# Patient Record
Sex: FEMALE | Race: WHITE | NOT HISPANIC OR LATINO
[De-identification: names, ages, dates, MRNs, and addresses within clinical notes are randomized per-mention and may not be internally consistent; named-entity substitution may affect disease eponyms.]

---

## 2017-02-27 ENCOUNTER — APPOINTMENT (OUTPATIENT)
Dept: UROGYNECOLOGY | Facility: CLINIC | Age: 67
End: 2017-02-27

## 2017-02-27 DIAGNOSIS — R39.15 URGENCY OF URINATION: ICD-10-CM

## 2017-03-31 ENCOUNTER — APPOINTMENT (OUTPATIENT)
Dept: ULTRASOUND IMAGING | Facility: CLINIC | Age: 67
End: 2017-03-31

## 2017-03-31 ENCOUNTER — OUTPATIENT (OUTPATIENT)
Dept: OUTPATIENT SERVICES | Facility: HOSPITAL | Age: 67
LOS: 1 days | End: 2017-03-31
Payer: MEDICARE

## 2017-03-31 DIAGNOSIS — N81.9 FEMALE GENITAL PROLAPSE, UNSPECIFIED: ICD-10-CM

## 2017-03-31 PROCEDURE — 76856 US EXAM PELVIC COMPLETE: CPT

## 2017-03-31 PROCEDURE — 76830 TRANSVAGINAL US NON-OB: CPT

## 2017-04-17 ENCOUNTER — APPOINTMENT (OUTPATIENT)
Dept: UROGYNECOLOGY | Facility: CLINIC | Age: 67
End: 2017-04-17

## 2017-08-06 ENCOUNTER — EMERGENCY (EMERGENCY)
Facility: HOSPITAL | Age: 67
LOS: 1 days | Discharge: HOME | End: 2017-08-06
Attending: EMERGENCY MEDICINE | Admitting: EMERGENCY MEDICINE
Payer: MEDICARE

## 2017-08-06 VITALS
SYSTOLIC BLOOD PRESSURE: 161 MMHG | TEMPERATURE: 99 F | OXYGEN SATURATION: 98 % | HEART RATE: 97 BPM | RESPIRATION RATE: 18 BRPM | WEIGHT: 125 LBS | DIASTOLIC BLOOD PRESSURE: 90 MMHG | HEIGHT: 64 IN

## 2017-08-06 PROCEDURE — 73110 X-RAY EXAM OF WRIST: CPT | Mod: 26,RT

## 2017-08-06 PROCEDURE — 73502 X-RAY EXAM HIP UNI 2-3 VIEWS: CPT | Mod: 26,RT

## 2017-08-06 PROCEDURE — 73100 X-RAY EXAM OF WRIST: CPT | Mod: 26,52,59,RT

## 2017-08-06 PROCEDURE — 99283 EMERGENCY DEPT VISIT LOW MDM: CPT

## 2017-08-06 RX ORDER — MORPHINE SULFATE 50 MG/1
2 CAPSULE, EXTENDED RELEASE ORAL ONCE
Qty: 0 | Refills: 0 | Status: DISCONTINUED | OUTPATIENT
Start: 2017-08-06 | End: 2017-08-06

## 2017-08-06 RX ORDER — SODIUM CHLORIDE 9 MG/ML
3 INJECTION INTRAMUSCULAR; INTRAVENOUS; SUBCUTANEOUS ONCE
Qty: 0 | Refills: 0 | Status: COMPLETED | OUTPATIENT
Start: 2017-08-06 | End: 2017-08-06

## 2017-08-06 RX ADMIN — SODIUM CHLORIDE 3 MILLILITER(S): 9 INJECTION INTRAMUSCULAR; INTRAVENOUS; SUBCUTANEOUS at 22:22

## 2017-08-06 RX ADMIN — MORPHINE SULFATE 2 MILLIGRAM(S): 50 CAPSULE, EXTENDED RELEASE ORAL at 22:16

## 2017-08-06 NOTE — ED ADULT NURSE NOTE - OBJECTIVE STATEMENT
paul states that she was walking her little dog and fell over her flip flop and lander on the right wrist - wrist deformed with pulses present at this time,

## 2017-08-06 NOTE — ED STATDOCS - ATTENDING CONTRIBUTION TO CARE
I, Chicho Alba, performed the initial face to face bedside interview with this patient regarding history of present illness, review of symptoms and relevant past medical, social and family history.  I completed an independent physical examination.  I was the initial provider who evaluated this patient. I have signed out the follow up of any pending tests (i.e. labs, radiological studies) to the ACP.  I have communicated the patient’s plan of care and disposition with the ACP.

## 2017-08-06 NOTE — ED STATDOCS - CARE PLAN
Principal Discharge DX:	Radius fracture  Instructions for follow-up, activity and diet:	F/u with Orthopedic as discussed  Secondary Diagnosis:	Hip pain

## 2017-08-06 NOTE — CONSULT NOTE ADULT - SUBJECTIVE AND OBJECTIVE BOX
Pt Name: ALTAGRACIA BUTLER    MRN: [id number]      Patient is a 67y Female presenting to the emergency department with a chief complaint of right wrist pain and hip s/p fall   Patient is a 67y old  Female who presents with a chief complaint of right wrist pain and hip pain s/p fall.    HEALTH ISSUES - PROBLEM Dx: Right radius fx, Right hip pain       .      REVIEW OF SYSTEMS      General: no fevers, no chills 	    Skin/Breast: no rash  	  Ophthalmologic:  	  ENMT:	    Respiratory and Thorax:   no dyspnea   	  Cardiovascular:	no chest pain     Gastrointestinal:	no abdominal pain, no n/v/d    Genitourinary:	    Musculoskeletal: right wrist pain/deformity/hip pain 	    Neurological:	no loc    Psychiatric:	    Hematology/Lymphatics:	    Endocrine:	    Allergic/Immunologic:	    ROS is otherwise negative.    PAST MEDICAL & SURGICAL HISTORY:  PAST MEDICAL & SURGICAL HISTORY:  Osteoarthritis  Osteoporosis  Rheumatoid arthritis  IBS (irritable bowel syndrome)  Breast cancer in female      Allergies: aspirin (Unknown)  latex (Hives)  No Known Drug Allergies      Medications:     FAMILY HISTORY:  : non-contributory    Social History:  denies tobacco use     Ambulation: Walking independently               PHYSICAL EXAM:    Vital Signs Last 24 Hrs  T(C): 37.3 (06 Aug 2017 21:31), Max: 37.3 (06 Aug 2017 21:31)  T(F): 99.2 (06 Aug 2017 21:31), Max: 99.2 (06 Aug 2017 21:31)  HR: 97 (06 Aug 2017 21:31) (97 - 97)  BP: 161/90 (06 Aug 2017 21:31) (161/90 - 161/90)  BP(mean): --  RR: 18 (06 Aug 2017 21:31) (18 - 18)  SpO2: 98% (06 Aug 2017 21:31) (98% - 98%)  Daily Height in cm: 162.56 (06 Aug 2017 21:31)    Daily     Appearance: Alert, responsive, in mild acute distress.    Neck FROM    ENT Mucus membranes moist     Resp no labored breathing     ABD Soft NT    Neurological: Sensation is grossly intact to light touch. 5/5 motor function of all extremities. No focal deficits or weaknesses found.    Skin: no rash on visible skin. Skin is clean, dry and intact. No bleeding. No abrasions. No ulcerations.    Vascular: 2+ distal pulses. Cap refill < 2 sec. No signs of venous insuffiency or stasis. No extremity ulcerations. No cyanosis.    Musculoskeletal:           Right Upper Extremity: Positive obvious deformity to wrist, pulse intact, sensation intact, FROM to hand and elbow with no tenderness          Right Lower Extremity: mild tenderness to lateral hip, no rotation, no shortening, FROM, no bruising     Imaging Studies: right wrist positive distal radius fx, right hip xray positive OA, can not rule out fx    FRACTURE REDUCTION  PROCEDURE NOTE: Fracture reduction     Performed by:  Evert Wright PA-C    Indication: Acute fracture with displacement, requiring fracture reduction.    Consent: The risks and benefits of the procedure including incomplete reduction, nerve damage and bleeding were explained and the patient verbalized their understanding and wished to proceed with the procedure. Written consent was obtained following the discussion.    Universal Protocol: a time out was performed and the correct patient and site were verified     Procedure: Neurovascular exam intact prior to fracture reduction.  Skin exam : No bleeding or lacerations at the fracture site. Anesthesia/pain control, using aseptic technique, was administered using a hematoma block of 10 ml of 1% lidocaine. Reduction of the Right wrist  was accomplished via axial traction and careful manipulation. Following adequate reduction and alignment of the fractured bone, the fracture was immobilized with a  plaster splint. Distally, the extremity was neurovascular intact following the procedure.  The patient tolerated the procedure well.    Post reduction films obtained and demonstrated an adequate reduction.    Complications: None      Case discussed with Dr. Cruz who gave plan     A/P:  Pt is a  67y Female with Patient is a 67y old  Female who presents with a chief complaint of  found to have right distal radius fx and right hip pain     PLAN:   1. Needs CT of hip   2. NWB right upper extremity   3. Further plan pending CT Pt Name: ALTAGRACIA BUTLER    MRN: [id number]      Patient is a 67y Female presenting to the emergency department with a chief complaint of right wrist pain and hip s/p fall   Patient is a 67y old  Female who presents with a chief complaint of right wrist pain and hip pain s/p fall.    HEALTH ISSUES - PROBLEM Dx: Right radius fx, Right hip pain       .      REVIEW OF SYSTEMS      General: no fevers, no chills 	    Skin/Breast: no rash  	  Ophthalmologic:  	  ENMT:	    Respiratory and Thorax:   no dyspnea   	  Cardiovascular:	no chest pain     Gastrointestinal:	no abdominal pain, no n/v/d    Genitourinary:	    Musculoskeletal: right wrist pain/deformity/hip pain 	    Neurological:	no loc    Psychiatric:	    Hematology/Lymphatics:	    Endocrine:	    Allergic/Immunologic:	    ROS is otherwise negative.    PAST MEDICAL & SURGICAL HISTORY:  PAST MEDICAL & SURGICAL HISTORY:  Osteoarthritis  Osteoporosis  Rheumatoid arthritis  IBS (irritable bowel syndrome)  Breast cancer in female      Allergies: aspirin (Unknown)  latex (Hives)  No Known Drug Allergies      Medications:     FAMILY HISTORY:  : non-contributory    Social History:  denies tobacco use, Quit years ago     Ambulation: Walking independently               PHYSICAL EXAM:    Vital Signs Last 24 Hrs  T(C): 37.3 (06 Aug 2017 21:31), Max: 37.3 (06 Aug 2017 21:31)  T(F): 99.2 (06 Aug 2017 21:31), Max: 99.2 (06 Aug 2017 21:31)  HR: 97 (06 Aug 2017 21:31) (97 - 97)  BP: 161/90 (06 Aug 2017 21:31) (161/90 - 161/90)  BP(mean): --  RR: 18 (06 Aug 2017 21:31) (18 - 18)  SpO2: 98% (06 Aug 2017 21:31) (98% - 98%)  Daily Height in cm: 162.56 (06 Aug 2017 21:31)    Daily     Appearance: Alert, responsive, in mild acute distress.    Neck FROM    ENT Mucus membranes moist     Resp no labored breathing     ABD Soft NT    Neurological: Sensation is grossly intact to light touch. 5/5 motor function of all extremities. No focal deficits or weaknesses found.    Skin: no rash on visible skin. Skin is clean, dry and intact. No bleeding. No abrasions. No ulcerations.    Vascular: 2+ distal pulses. Cap refill < 2 sec. No signs of venous insuffiency or stasis. No extremity ulcerations. No cyanosis.    Musculoskeletal:           Right Upper Extremity: Positive obvious deformity to wrist, pulse intact, sensation intact, FROM to hand and elbow with no tenderness          Right Lower Extremity: mild tenderness to lateral hip, no rotation, no shortening, FROM, no bruising     Imaging Studies: right wrist positive distal radius fx, right hip xray positive OA, can not rule out fx    FRACTURE REDUCTION  PROCEDURE NOTE: Fracture reduction     Performed by:  Evert Wright PA-C    Indication: Acute fracture with displacement, requiring fracture reduction.    Consent: The risks and benefits of the procedure including incomplete reduction, nerve damage and bleeding were explained and the patient verbalized their understanding and wished to proceed with the procedure. Written consent was obtained following the discussion.    Universal Protocol: a time out was performed and the correct patient and site were verified     Procedure: Neurovascular exam intact prior to fracture reduction.  Skin exam : No bleeding or lacerations at the fracture site. Anesthesia/pain control, using aseptic technique, was administered using a hematoma block of 10 ml of 1% lidocaine. Reduction of the Right wrist  was accomplished via axial traction and careful manipulation. Following adequate reduction and alignment of the fractured bone, the fracture was immobilized with a  plaster splint. Distally, the extremity was neurovascular intact following the procedure.  The patient tolerated the procedure well.    Post reduction films obtained and demonstrated an adequate reduction.    Complications: None      Case discussed with Dr. Cruz who gave plan     A/P:  Pt is a  67y Female with Patient is a 67y old  Female who presents with a chief complaint of  found to have right distal radius fx and right hip pain     PLAN:   1. Needs CT of hip   2. NWB right upper extremity   3. Further plan pending CT Pt Name: ALTAGRACIA BUTLER    MRN: [id number]      Patient is a 67y Female presenting to the emergency department with a chief complaint of right wrist pain and hip s/p fall   Patient is a 67y old  Female who presents with a chief complaint of right wrist pain and hip pain s/p fall.    HEALTH ISSUES - PROBLEM Dx: Right radius fx, Right hip pain       .      REVIEW OF SYSTEMS      General: no fevers, no chills 	    Skin/Breast: no rash  	  Ophthalmologic:  	  ENMT:	    Respiratory and Thorax:   no dyspnea   	  Cardiovascular:	no chest pain     Gastrointestinal:	no abdominal pain, no n/v/d    Genitourinary:	    Musculoskeletal: right wrist pain/deformity/hip pain 	    Neurological:	no loc    Psychiatric:	    Hematology/Lymphatics:	    Endocrine:	    Allergic/Immunologic:	    ROS is otherwise negative.    PAST MEDICAL & SURGICAL HISTORY:  PAST MEDICAL & SURGICAL HISTORY:  Osteoarthritis  Osteoporosis  Rheumatoid arthritis  IBS (irritable bowel syndrome)  Breast cancer in female      Allergies: aspirin (Unknown)  latex (Hives)  No Known Drug Allergies      Medications:     FAMILY HISTORY:  : non-contributory    Social History:  denies tobacco use, Quit years ago     Ambulation: Walking independently               PHYSICAL EXAM:    Vital Signs Last 24 Hrs  T(C): 37.3 (06 Aug 2017 21:31), Max: 37.3 (06 Aug 2017 21:31)  T(F): 99.2 (06 Aug 2017 21:31), Max: 99.2 (06 Aug 2017 21:31)  HR: 97 (06 Aug 2017 21:31) (97 - 97)  BP: 161/90 (06 Aug 2017 21:31) (161/90 - 161/90)  BP(mean): --  RR: 18 (06 Aug 2017 21:31) (18 - 18)  SpO2: 98% (06 Aug 2017 21:31) (98% - 98%)  Daily Height in cm: 162.56 (06 Aug 2017 21:31)    Daily     Appearance: Alert, responsive, in mild acute distress.    Neck FROM    ENT Mucus membranes moist     Resp no labored breathing     ABD Soft NT    Neurological: Sensation is grossly intact to light touch. 5/5 motor function of all extremities. No focal deficits or weaknesses found.    Skin: no rash on visible skin. Skin is clean, dry and intact. No bleeding. No abrasions. No ulcerations.    Vascular: 2+ distal pulses. Cap refill < 2 sec. No signs of venous insuffiency or stasis. No extremity ulcerations. No cyanosis.    Musculoskeletal:           Right Upper Extremity: Positive obvious deformity to wrist, pulse intact, sensation intact, FROM to hand and elbow with no tenderness          Right Lower Extremity: mild tenderness to lateral hip, no rotation, no shortening, FROM, no bruising     Imaging Studies: right wrist positive distal radius fx, right hip xray positive OA, can not rule out fx    FRACTURE REDUCTION  PROCEDURE NOTE: Fracture reduction     Performed by:  Evert Wright PA-C    Indication: Acute fracture with displacement, requiring fracture reduction.    Consent: The risks and benefits of the procedure including incomplete reduction, nerve damage and bleeding were explained and the patient verbalized their understanding and wished to proceed with the procedure. Written consent was obtained following the discussion.    Universal Protocol: a time out was performed and the correct patient and site were verified     Procedure: Neurovascular exam intact prior to fracture reduction.  Skin exam : No bleeding or lacerations at the fracture site. Anesthesia/pain control, using aseptic technique, was administered using a hematoma block of 10 ml of 1% lidocaine. Reduction of the Right wrist  was accomplished via axial traction and careful manipulation. Following adequate reduction and alignment of the fractured bone, the fracture was immobilized with a  plaster splint. Distally, the extremity was neurovascular intact following the procedure.  The patient tolerated the procedure well.    Post reduction films obtained and demonstrated an improved reduction.    Complications: None      Case discussed with Dr. Cruz who gave plan

## 2017-08-06 NOTE — CONSULT NOTE ADULT - ASSESSMENT
A/P:  Pt is a  67y Female with Patient is a 67y old  Female who presents with a chief complaint of  found to have right distal radius fx and right hip pain     PLAN:   1. Needs CT of hip   2. NWB right upper extremity   3. Further plan pending CT   4. Patient aware wrist will likely need operative repair as outpatient

## 2017-08-06 NOTE — ED ADULT NURSE NOTE - PMH
Breast cancer in female    IBS (irritable bowel syndrome)    Osteoarthritis    Osteoporosis    Rheumatoid arthritis

## 2017-08-06 NOTE — ED STATDOCS - OBJECTIVE STATEMENT
67 year old female, with hx of IBS, osteoporosis, and RA,  presenting to the ED complaining of right wrist pain and swelling s/p fall approximately 1 hour ago. She states that she has associated right hip pain as she had fallen on her right side.  Pt states that she is allergic to aspirin and latex. She states that she does not take any medications currently. No further complaints at this time.

## 2017-08-07 ENCOUNTER — EMERGENCY (EMERGENCY)
Facility: HOSPITAL | Age: 67
LOS: 1 days | Discharge: HOME | End: 2017-08-07
Attending: EMERGENCY MEDICINE
Payer: MEDICARE

## 2017-08-07 ENCOUNTER — APPOINTMENT (OUTPATIENT)
Dept: ORTHOPEDIC SURGERY | Facility: CLINIC | Age: 67
End: 2017-08-07
Payer: MEDICARE

## 2017-08-07 VITALS
OXYGEN SATURATION: 99 % | SYSTOLIC BLOOD PRESSURE: 184 MMHG | HEART RATE: 91 BPM | DIASTOLIC BLOOD PRESSURE: 104 MMHG | TEMPERATURE: 98 F | RESPIRATION RATE: 20 BRPM

## 2017-08-07 VITALS
WEIGHT: 125 LBS | SYSTOLIC BLOOD PRESSURE: 126 MMHG | BODY MASS INDEX: 21.34 KG/M2 | DIASTOLIC BLOOD PRESSURE: 83 MMHG | HEART RATE: 71 BPM | HEIGHT: 64 IN

## 2017-08-07 VITALS
HEART RATE: 77 BPM | TEMPERATURE: 99 F | SYSTOLIC BLOOD PRESSURE: 153 MMHG | RESPIRATION RATE: 18 BRPM | HEIGHT: 64 IN | WEIGHT: 125 LBS | DIASTOLIC BLOOD PRESSURE: 90 MMHG | OXYGEN SATURATION: 100 %

## 2017-08-07 DIAGNOSIS — Z88.6 ALLERGY STATUS TO ANALGESIC AGENT: ICD-10-CM
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
## 2017-08-07 DIAGNOSIS — M25.531 PAIN IN RIGHT WRIST: ICD-10-CM

## 2017-08-07 DIAGNOSIS — S62.101A FRACTURE OF UNSPECIFIED CARPAL BONE, RIGHT WRIST, INITIAL ENCOUNTER FOR CLOSED FRACTURE: ICD-10-CM

## 2017-08-07 DIAGNOSIS — S60.011A CONTUSION OF RIGHT THUMB WITHOUT DAMAGE TO NAIL, INITIAL ENCOUNTER: ICD-10-CM

## 2017-08-07 DIAGNOSIS — Z91.010 ALLERGY TO PEANUTS: ICD-10-CM
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
## 2017-08-07 PROCEDURE — 73200 CT UPPER EXTREMITY W/O DYE: CPT

## 2017-08-07 PROCEDURE — 72192 CT PELVIS W/O DYE: CPT

## 2017-08-07 PROCEDURE — 76377 3D RENDER W/INTRP POSTPROCES: CPT

## 2017-08-07 PROCEDURE — 73110 X-RAY EXAM OF WRIST: CPT | Mod: 26,RT

## 2017-08-07 PROCEDURE — 99284 EMERGENCY DEPT VISIT MOD MDM: CPT

## 2017-08-07 PROCEDURE — 73200 CT UPPER EXTREMITY W/O DYE: CPT | Mod: 26,RT

## 2017-08-07 PROCEDURE — 72192 CT PELVIS W/O DYE: CPT | Mod: 26

## 2017-08-07 PROCEDURE — 99285 EMERGENCY DEPT VISIT HI MDM: CPT | Mod: 25

## 2017-08-07 PROCEDURE — 25605 CLTX DST RDL FX/EPHYS SEP W/: CPT | Mod: RT

## 2017-08-07 PROCEDURE — 96374 THER/PROPH/DIAG INJ IV PUSH: CPT | Mod: XU

## 2017-08-07 PROCEDURE — 73100 X-RAY EXAM OF WRIST: CPT

## 2017-08-07 PROCEDURE — 99283 EMERGENCY DEPT VISIT LOW MDM: CPT

## 2017-08-07 PROCEDURE — 99203 OFFICE O/P NEW LOW 30 MIN: CPT

## 2017-08-07 PROCEDURE — 76377 3D RENDER W/INTRP POSTPROCES: CPT | Mod: 26

## 2017-08-07 PROCEDURE — 73502 X-RAY EXAM HIP UNI 2-3 VIEWS: CPT

## 2017-08-07 PROCEDURE — 73110 X-RAY EXAM OF WRIST: CPT

## 2017-08-07 PROCEDURE — 96376 TX/PRO/DX INJ SAME DRUG ADON: CPT | Mod: XU

## 2017-08-07 RX ORDER — MORPHINE SULFATE 50 MG/1
4 CAPSULE, EXTENDED RELEASE ORAL ONCE
Qty: 0 | Refills: 0 | Status: DISCONTINUED | OUTPATIENT
Start: 2017-08-07 | End: 2017-08-07

## 2017-08-07 RX ADMIN — MORPHINE SULFATE 4 MILLIGRAM(S): 50 CAPSULE, EXTENDED RELEASE ORAL at 03:16

## 2017-08-07 NOTE — ED STATDOCS - OBJECTIVE STATEMENT
68 y/o F presents to ED c/o R wrist pain s/p splint being placed last night. Pt states she visited [facility] last night after fx her R wrist and reports pain due to the tightness of her splint. She states that she noticed her fingers turning purple which prompted her visit to the ED today. Pt notes when elevated her fingers does not turn purple. Denies fever, dizziness or any other complaints at this time.

## 2017-08-07 NOTE — ED ADULT NURSE NOTE - OBJECTIVE STATEMENT
Patient recd this evening A/Ox3, VSS, presents to ED s/p fall, patient was at [facility] last night for broken right arm, patient was discharged home when she began to notice her fingers turning purple/numbness.  Patient denies chest pain or sob.  Respirations are even and unlabored, lungs cta, +bowel x4 quads, abdomen soft, nontender/nondistended, skin w/d/i.

## 2017-08-07 NOTE — ED STATDOCS - PROGRESS NOTE DETAILS
Pt with 2+ radial pulse of the affected right extremity with sugar tong splint in place. Pts webroll cut to allow release of pressure. Pt reports significant relief. Repeat xrays show no movement of fx. Pt stable for d/c with ortho f/u.

## 2017-08-07 NOTE — ED STATDOCS - ATTENDING CONTRIBUTION TO CARE
I, Leroy Mcdonald, performed the initial face to face bedside interview with this patient regarding history of present illness, review of symptoms and relevant past medical, social and family history.  I completed an independent physical examination.  I was the initial provider who evaluated this patient. I have signed out the follow up of any pending tests (i.e. labs, radiological studies) to the ACP.  I have communicated the patient’s plan of care and disposition with the ACP.  The history, relevant review of systems, past medical and surgical history, medical decision making, and physical examination was documented by the scribe in my presence and I attest to the accuracy of the documentation.

## 2017-08-13 ENCOUNTER — TRANSCRIPTION ENCOUNTER (OUTPATIENT)
Age: 67
End: 2017-08-13

## 2017-08-13 ENCOUNTER — INPATIENT (INPATIENT)
Facility: HOSPITAL | Age: 67
LOS: 0 days | Discharge: HOME | End: 2017-08-13
Attending: ORTHOPAEDIC SURGERY | Admitting: ORTHOPAEDIC SURGERY
Payer: MEDICARE

## 2017-08-13 VITALS
SYSTOLIC BLOOD PRESSURE: 158 MMHG | DIASTOLIC BLOOD PRESSURE: 78 MMHG | HEART RATE: 65 BPM | OXYGEN SATURATION: 100 % | RESPIRATION RATE: 16 BRPM | TEMPERATURE: 98 F

## 2017-08-13 VITALS
TEMPERATURE: 97 F | OXYGEN SATURATION: 100 % | DIASTOLIC BLOOD PRESSURE: 91 MMHG | HEART RATE: 70 BPM | HEIGHT: 55 IN | SYSTOLIC BLOOD PRESSURE: 157 MMHG | WEIGHT: 134.04 LBS | RESPIRATION RATE: 18 BRPM

## 2017-08-13 PROCEDURE — 99285 EMERGENCY DEPT VISIT HI MDM: CPT

## 2017-08-13 RX ORDER — OXYCODONE HYDROCHLORIDE 5 MG/1
10 TABLET ORAL EVERY 4 HOURS
Qty: 0 | Refills: 0 | Status: DISCONTINUED | OUTPATIENT
Start: 2017-08-13 | End: 2017-08-13

## 2017-08-13 RX ORDER — ACETAMINOPHEN 500 MG
650 TABLET ORAL EVERY 6 HOURS
Qty: 0 | Refills: 0 | Status: DISCONTINUED | OUTPATIENT
Start: 2017-08-13 | End: 2017-08-13

## 2017-08-13 RX ORDER — HYDROMORPHONE HYDROCHLORIDE 2 MG/ML
0.5 INJECTION INTRAMUSCULAR; INTRAVENOUS; SUBCUTANEOUS
Qty: 0 | Refills: 0 | Status: DISCONTINUED | OUTPATIENT
Start: 2017-08-13 | End: 2017-08-13

## 2017-08-13 RX ORDER — SODIUM CHLORIDE 9 MG/ML
1000 INJECTION INTRAMUSCULAR; INTRAVENOUS; SUBCUTANEOUS
Qty: 0 | Refills: 0 | Status: DISCONTINUED | OUTPATIENT
Start: 2017-08-13 | End: 2017-08-13

## 2017-08-13 RX ORDER — SODIUM CHLORIDE 9 MG/ML
1000 INJECTION, SOLUTION INTRAVENOUS
Qty: 0 | Refills: 0 | Status: DISCONTINUED | OUTPATIENT
Start: 2017-08-13 | End: 2017-08-13

## 2017-08-13 RX ORDER — FENTANYL CITRATE 50 UG/ML
50 INJECTION INTRAVENOUS
Qty: 0 | Refills: 0 | Status: DISCONTINUED | OUTPATIENT
Start: 2017-08-13 | End: 2017-08-13

## 2017-08-13 RX ORDER — ALPRAZOLAM 0.25 MG
0.25 TABLET ORAL ONCE
Qty: 0 | Refills: 0 | Status: DISCONTINUED | OUTPATIENT
Start: 2017-08-13 | End: 2017-08-13

## 2017-08-13 RX ORDER — HYDROMORPHONE HYDROCHLORIDE 2 MG/ML
0.5 INJECTION INTRAMUSCULAR; INTRAVENOUS; SUBCUTANEOUS EVERY 6 HOURS
Qty: 0 | Refills: 0 | Status: DISCONTINUED | OUTPATIENT
Start: 2017-08-13 | End: 2017-08-13

## 2017-08-13 RX ORDER — ONDANSETRON 8 MG/1
4 TABLET, FILM COATED ORAL EVERY 6 HOURS
Qty: 0 | Refills: 0 | Status: DISCONTINUED | OUTPATIENT
Start: 2017-08-13 | End: 2017-08-13

## 2017-08-13 RX ORDER — ACETAMINOPHEN 500 MG
1000 TABLET ORAL ONCE
Qty: 0 | Refills: 0 | Status: DISCONTINUED | OUTPATIENT
Start: 2017-08-13 | End: 2017-08-13

## 2017-08-13 RX ORDER — OXYCODONE HYDROCHLORIDE 5 MG/1
5 TABLET ORAL EVERY 4 HOURS
Qty: 0 | Refills: 0 | Status: DISCONTINUED | OUTPATIENT
Start: 2017-08-13 | End: 2017-08-13

## 2017-08-13 RX ORDER — DIPHENHYDRAMINE HCL 50 MG
25 CAPSULE ORAL AT BEDTIME
Qty: 0 | Refills: 0 | Status: DISCONTINUED | OUTPATIENT
Start: 2017-08-13 | End: 2017-08-13

## 2017-08-13 RX ORDER — IBUPROFEN 200 MG
400 TABLET ORAL ONCE
Qty: 0 | Refills: 0 | Status: COMPLETED | OUTPATIENT
Start: 2017-08-13 | End: 2017-08-13

## 2017-08-13 RX ADMIN — Medication 0.25 MILLIGRAM(S): at 07:41

## 2017-08-13 RX ADMIN — Medication 400 MILLIGRAM(S): at 14:43

## 2017-08-13 RX ADMIN — SODIUM CHLORIDE 75 MILLILITER(S): 9 INJECTION, SOLUTION INTRAVENOUS at 11:27

## 2017-08-13 RX ADMIN — OXYCODONE HYDROCHLORIDE 10 MILLIGRAM(S): 5 TABLET ORAL at 14:17

## 2017-08-13 NOTE — DISCHARGE NOTE ADULT - PLAN OF CARE
Return to ADL 1. Keep splint clean/dry/intact  2. Rest/ice/elevate arm  3. Pain rx given, take as directed  4. NWB in splint  5. Pt to f/u with Dr Rubio in a week, call office for appt

## 2017-08-13 NOTE — DISCHARGE NOTE ADULT - CARE PLAN
Principal Discharge DX:	Wrist fracture, closed, right, sequela  Goal:	Return to ADL  Instructions for follow-up, activity and diet:	1. Keep splint clean/dry/intact  2. Rest/ice/elevate arm  3. Pain rx given, take as directed  4. NWB in splint  5. Pt to f/u with Dr Rubio in a week, call office for appt

## 2017-08-13 NOTE — DISCHARGE NOTE ADULT - MEDICATION SUMMARY - MEDICATIONS TO TAKE
I will START or STAY ON the medications listed below when I get home from the hospital:    Percocet 5/325 325 mg-5 mg oral tablet  -- 1 tab(s) by mouth every 4 hours MDD:6  -- Caution federal law prohibits the transfer of this drug to any person other  than the person for whom it was prescribed.  May cause drowsiness.  Alcohol may intensify this effect.  Use care when operating dangerous machinery.  This prescription cannot be refilled.  This product contains acetaminophen.  Do not use  with any other product containing acetaminophen to prevent possible liver damage.  Using more of this medication than prescribed may cause serious breathing problems.    -- Indication: For prn pain

## 2017-08-13 NOTE — ED PROVIDER NOTE - OBJECTIVE STATEMENT
68yo F with a known wrist fracture which occurred 1 week ago.  She was seen at [facility].  Pt presents today as she is having surgery today.  Pre-op workup completed.  Pt otherwise asx.  She would like something for anxiety.  She does not take any medication for anxiety.  She took percocet last pm.

## 2017-08-13 NOTE — PROGRESS NOTE ADULT - SUBJECTIVE AND OBJECTIVE BOX
Right Infraclavicular Nerve Block Note:  Time out performed, pt awake and alert, sterile prep with chlorhexidine and drape, ultrasound-guided, 22G 2" stimuplex needle, good visualization of needle and nerve at all times, 30cc of 0.375% Ropivacaine injected easily, no heme after aspirating every 5cc, no intraneural injection, no paresthesia.  Procedure well tolerated without complications.

## 2017-08-13 NOTE — H&P ADULT - HISTORY OF PRESENT ILLNESS
67F presents to ED c/o R wrist fracture s/p mechanical fall one week ago, she initially went to [facility] for tx. She denies any numbness/tingling or muscle weakness. No other injuries.

## 2017-08-13 NOTE — DISCHARGE NOTE ADULT - PATIENT PORTAL LINK FT
“You can access the Follow[facility] Patient Portal, offered by [facility], by registering with the following website: [email or web address]”

## 2017-08-13 NOTE — DISCHARGE NOTE ADULT - CARE PROVIDER_API CALL
Aniket Rubio), Orthopaedic Surgery; Surgery of the Hand  [address] [place] [address]  Phone: [phone]  Fax: [phone]

## 2017-08-13 NOTE — DISCHARGE NOTE ADULT - NS AS ACTIVITY OBS
Bathing allowed/Do not drive or operate machinery/No Heavy lifting/straining/Showering allowed/Do not make important decisions

## 2017-08-13 NOTE — H&P ADULT - ASSESSMENT
67F with R Distal radius fx  plan for OR today  admit to orthopedics  NWB in splint  NPO  no anticoag  Dr Rubio aware and agrees

## 2017-08-13 NOTE — DISCHARGE NOTE ADULT - HOSPITAL COURSE
67F s/p Right distal radius Open reduction internal fixation. Pt was admitted through [facility], and was optimized for above mentioned procedure. Pt brought medical clearance/labs with her. Pt was taken to surgery on above mentioned date. Pre surgical antibiotics were administered. Pt was taken to recovery room in stable condition, and subsequently to surgical floor.  SCD's were given for dvt ppx. All home meds were continued. Splint was kept clean/dry/intact. The rest of the hospital stay was unremarkable.

## 2017-08-13 NOTE — ED ADULT NURSE NOTE - CAS EDN DISCHARGE ASSESSMENT
Alert and oriented to person, place and time/Awake/No adverse reaction to first time med in ED/Patient baseline mental status

## 2017-08-17 DIAGNOSIS — S62.109A FRACTURE OF UNSPECIFIED CARPAL BONE, UNSPECIFIED WRIST, INITIAL ENCOUNTER FOR CLOSED FRACTURE: ICD-10-CM

## 2017-08-17 DIAGNOSIS — F41.9 ANXIETY DISORDER, UNSPECIFIED: ICD-10-CM
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
## 2017-08-17 DIAGNOSIS — S52.571A OTHER INTRAARTICULAR FRACTURE OF LOWER END OF RIGHT RADIUS, INITIAL ENCOUNTER FOR CLOSED FRACTURE: ICD-10-CM
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
## 2018-06-01 ENCOUNTER — APPOINTMENT (OUTPATIENT)
Dept: ULTRASOUND IMAGING | Facility: CLINIC | Age: 68
End: 2018-06-01
Payer: MEDICARE

## 2018-06-01 ENCOUNTER — APPOINTMENT (OUTPATIENT)
Dept: MAMMOGRAPHY | Facility: CLINIC | Age: 68
End: 2018-06-01
Payer: MEDICARE

## 2018-06-01 ENCOUNTER — OUTPATIENT (OUTPATIENT)
Dept: OUTPATIENT SERVICES | Facility: HOSPITAL | Age: 68
LOS: 1 days | End: 2018-06-01
Payer: MEDICARE

## 2018-06-01 DIAGNOSIS — R92.8 OTHER ABNORMAL AND INCONCLUSIVE FINDINGS ON DIAGNOSTIC IMAGING OF BREAST: ICD-10-CM

## 2018-06-01 PROCEDURE — 77066 DX MAMMO INCL CAD BI: CPT | Mod: 26

## 2018-06-01 PROCEDURE — G0279: CPT

## 2018-06-01 PROCEDURE — 77066 DX MAMMO INCL CAD BI: CPT

## 2018-06-01 PROCEDURE — G0279: CPT | Mod: 26

## 2018-06-01 PROCEDURE — 76641 ULTRASOUND BREAST COMPLETE: CPT

## 2018-06-01 PROCEDURE — 76641 ULTRASOUND BREAST COMPLETE: CPT | Mod: 26,50

## 2018-06-07 ENCOUNTER — APPOINTMENT (OUTPATIENT)
Dept: ULTRASOUND IMAGING | Facility: CLINIC | Age: 68
End: 2018-06-07
Payer: MEDICARE

## 2018-06-07 ENCOUNTER — RESULT REVIEW (OUTPATIENT)
Age: 68
End: 2018-06-07

## 2018-06-07 ENCOUNTER — OUTPATIENT (OUTPATIENT)
Dept: OUTPATIENT SERVICES | Facility: HOSPITAL | Age: 68
LOS: 1 days | End: 2018-06-07
Payer: MEDICARE

## 2018-06-07 DIAGNOSIS — R92.8 OTHER ABNORMAL AND INCONCLUSIVE FINDINGS ON DIAGNOSTIC IMAGING OF BREAST: ICD-10-CM

## 2018-06-07 PROCEDURE — 19083 BX BREAST 1ST LESION US IMAG: CPT | Mod: LT

## 2018-06-07 PROCEDURE — 88341 IMHCHEM/IMCYTCHM EA ADD ANTB: CPT | Mod: 26

## 2018-06-07 PROCEDURE — 77065 DX MAMMO INCL CAD UNI: CPT

## 2018-06-07 PROCEDURE — 88305 TISSUE EXAM BY PATHOLOGIST: CPT | Mod: 26

## 2018-06-07 PROCEDURE — 88305 TISSUE EXAM BY PATHOLOGIST: CPT

## 2018-06-07 PROCEDURE — A4648: CPT

## 2018-06-07 PROCEDURE — 88341 IMHCHEM/IMCYTCHM EA ADD ANTB: CPT

## 2018-06-07 PROCEDURE — 88342 IMHCHEM/IMCYTCHM 1ST ANTB: CPT

## 2018-06-07 PROCEDURE — 88342 IMHCHEM/IMCYTCHM 1ST ANTB: CPT | Mod: 26

## 2018-06-07 PROCEDURE — 77065 DX MAMMO INCL CAD UNI: CPT | Mod: 26,LT

## 2018-06-07 PROCEDURE — 19083 BX BREAST 1ST LESION US IMAG: CPT

## 2018-06-08 ENCOUNTER — TRANSCRIPTION ENCOUNTER (OUTPATIENT)
Age: 68
End: 2018-06-08

## 2018-06-25 ENCOUNTER — APPOINTMENT (OUTPATIENT)
Dept: SURGERY | Facility: CLINIC | Age: 68
End: 2018-06-25
Payer: MEDICARE

## 2018-06-25 VITALS
DIASTOLIC BLOOD PRESSURE: 81 MMHG | TEMPERATURE: 98.9 F | WEIGHT: 135 LBS | HEART RATE: 66 BPM | HEIGHT: 64 IN | BODY MASS INDEX: 23.05 KG/M2 | OXYGEN SATURATION: 98 % | SYSTOLIC BLOOD PRESSURE: 135 MMHG

## 2018-06-25 DIAGNOSIS — G47.9 SLEEP DISORDER, UNSPECIFIED: ICD-10-CM

## 2018-06-25 DIAGNOSIS — F41.9 ANXIETY DISORDER, UNSPECIFIED: ICD-10-CM

## 2018-06-25 PROCEDURE — 99205 OFFICE O/P NEW HI 60 MIN: CPT

## 2018-06-25 RX ORDER — CIPROFLOXACIN HYDROCHLORIDE 500 MG/1
500 TABLET, FILM COATED ORAL
Qty: 20 | Refills: 0 | Status: DISCONTINUED | COMMUNITY
Start: 2017-12-27

## 2018-06-25 RX ORDER — GABAPENTIN 100 MG/1
100 CAPSULE ORAL
Qty: 30 | Refills: 0 | Status: DISCONTINUED | COMMUNITY
Start: 2017-04-17 | End: 2018-06-25

## 2018-06-26 ENCOUNTER — APPOINTMENT (OUTPATIENT)
Dept: BREAST CENTER | Facility: CLINIC | Age: 68
End: 2018-06-26
Payer: MEDICARE

## 2018-06-26 VITALS
BODY MASS INDEX: 22.2 KG/M2 | WEIGHT: 130 LBS | HEIGHT: 64 IN | DIASTOLIC BLOOD PRESSURE: 78 MMHG | SYSTOLIC BLOOD PRESSURE: 140 MMHG

## 2018-06-26 DIAGNOSIS — Z80.51 FAMILY HISTORY OF MALIGNANT NEOPLASM OF KIDNEY: ICD-10-CM

## 2018-06-26 DIAGNOSIS — Z82.0 FAMILY HISTORY OF EPILEPSY AND OTHER DISEASES OF THE NERVOUS SYSTEM: ICD-10-CM

## 2018-06-26 DIAGNOSIS — Z80.52 FAMILY HISTORY OF MALIGNANT NEOPLASM OF BLADDER: ICD-10-CM

## 2018-06-26 DIAGNOSIS — Z80.1 FAMILY HISTORY OF MALIGNANT NEOPLASM OF TRACHEA, BRONCHUS AND LUNG: ICD-10-CM

## 2018-06-26 PROCEDURE — 99205 OFFICE O/P NEW HI 60 MIN: CPT

## 2018-07-05 ENCOUNTER — MESSAGE (OUTPATIENT)
Age: 68
End: 2018-07-05

## 2018-07-10 ENCOUNTER — APPOINTMENT (OUTPATIENT)
Dept: SURGERY | Facility: CLINIC | Age: 68
End: 2018-07-10

## 2018-07-12 ENCOUNTER — OUTPATIENT (OUTPATIENT)
Dept: OUTPATIENT SERVICES | Facility: HOSPITAL | Age: 68
LOS: 1 days | End: 2018-07-12
Payer: MEDICARE

## 2018-07-12 ENCOUNTER — APPOINTMENT (OUTPATIENT)
Dept: MRI IMAGING | Facility: CLINIC | Age: 68
End: 2018-07-12
Payer: MEDICARE

## 2018-07-12 DIAGNOSIS — Z00.8 ENCOUNTER FOR OTHER GENERAL EXAMINATION: ICD-10-CM

## 2018-07-12 PROCEDURE — C8908: CPT

## 2018-07-12 PROCEDURE — 0159T: CPT | Mod: 26

## 2018-07-12 PROCEDURE — 82565 ASSAY OF CREATININE: CPT

## 2018-07-12 PROCEDURE — 77059 MRI BREAST BILATERAL: CPT | Mod: 26

## 2018-07-12 PROCEDURE — C8937: CPT

## 2018-07-12 PROCEDURE — A9585: CPT

## 2018-07-13 ENCOUNTER — MOBILE ON CALL (OUTPATIENT)
Age: 68
End: 2018-07-13

## 2018-07-16 PROBLEM — M06.9 RHEUMATOID ARTHRITIS, UNSPECIFIED: Chronic | Status: ACTIVE | Noted: 2017-08-13

## 2018-07-16 PROBLEM — M81.0 AGE-RELATED OSTEOPOROSIS WITHOUT CURRENT PATHOLOGICAL FRACTURE: Chronic | Status: ACTIVE | Noted: 2017-08-06

## 2018-07-16 PROBLEM — K58.9 IRRITABLE BOWEL SYNDROME, UNSPECIFIED: Chronic | Status: ACTIVE | Noted: 2017-08-06

## 2018-07-16 PROBLEM — M19.90 UNSPECIFIED OSTEOARTHRITIS, UNSPECIFIED SITE: Chronic | Status: ACTIVE | Noted: 2017-08-06

## 2018-07-16 PROBLEM — K58.9 IRRITABLE BOWEL SYNDROME WITHOUT DIARRHEA: Chronic | Status: ACTIVE | Noted: 2017-08-06

## 2018-07-17 ENCOUNTER — MOBILE ON CALL (OUTPATIENT)
Age: 68
End: 2018-07-17

## 2018-07-18 ENCOUNTER — OUTPATIENT (OUTPATIENT)
Dept: OUTPATIENT SERVICES | Facility: HOSPITAL | Age: 68
LOS: 1 days | End: 2018-07-18
Payer: MEDICARE

## 2018-07-18 ENCOUNTER — APPOINTMENT (OUTPATIENT)
Dept: ULTRASOUND IMAGING | Facility: CLINIC | Age: 68
End: 2018-07-18

## 2018-07-18 DIAGNOSIS — R93.8 ABNORMAL FINDINGS ON DIAGNOSTIC IMAGING OF OTHER SPECIFIED BODY STRUCTURES: ICD-10-CM

## 2018-07-18 PROCEDURE — 76642 ULTRASOUND BREAST LIMITED: CPT | Mod: 26,RT

## 2018-07-18 PROCEDURE — 76642 ULTRASOUND BREAST LIMITED: CPT

## 2018-07-20 ENCOUNTER — OUTPATIENT (OUTPATIENT)
Dept: OUTPATIENT SERVICES | Facility: HOSPITAL | Age: 68
LOS: 1 days | Discharge: HOME | End: 2018-07-20

## 2018-07-20 DIAGNOSIS — C50.919 MALIGNANT NEOPLASM OF UNSPECIFIED SITE OF UNSPECIFIED FEMALE BREAST: ICD-10-CM

## 2018-07-25 ENCOUNTER — RESULT REVIEW (OUTPATIENT)
Age: 68
End: 2018-07-25

## 2018-07-25 ENCOUNTER — OUTPATIENT (OUTPATIENT)
Dept: OUTPATIENT SERVICES | Facility: HOSPITAL | Age: 68
LOS: 1 days | Discharge: HOME | End: 2018-07-25
Payer: MEDICARE

## 2018-07-25 DIAGNOSIS — D05.12 INTRADUCTAL CARCINOMA IN SITU OF LEFT BREAST: ICD-10-CM

## 2018-07-25 PROCEDURE — 88321 CONSLTJ&REPRT SLD PREP ELSWR: CPT

## 2018-07-25 PROCEDURE — 99285 EMERGENCY DEPT VISIT HI MDM: CPT

## 2018-07-26 ENCOUNTER — OUTPATIENT (OUTPATIENT)
Dept: OUTPATIENT SERVICES | Facility: HOSPITAL | Age: 68
LOS: 1 days | End: 2018-07-26
Payer: MEDICARE

## 2018-07-26 ENCOUNTER — CLINICAL ADVICE (OUTPATIENT)
Age: 68
End: 2018-07-26

## 2018-07-26 ENCOUNTER — RESULT REVIEW (OUTPATIENT)
Age: 68
End: 2018-07-26

## 2018-07-26 ENCOUNTER — APPOINTMENT (OUTPATIENT)
Dept: MRI IMAGING | Facility: CLINIC | Age: 68
End: 2018-07-26
Payer: MEDICARE

## 2018-07-26 DIAGNOSIS — R93.8 ABNORMAL FINDINGS ON DIAGNOSTIC IMAGING OF OTHER SPECIFIED BODY STRUCTURES: ICD-10-CM

## 2018-07-26 PROCEDURE — 88342 IMHCHEM/IMCYTCHM 1ST ANTB: CPT

## 2018-07-26 PROCEDURE — 19085 BX BREAST 1ST LESION MR IMAG: CPT | Mod: LT

## 2018-07-26 PROCEDURE — 77066 DX MAMMO INCL CAD BI: CPT

## 2018-07-26 PROCEDURE — 88342 IMHCHEM/IMCYTCHM 1ST ANTB: CPT | Mod: 26

## 2018-07-26 PROCEDURE — 88305 TISSUE EXAM BY PATHOLOGIST: CPT

## 2018-07-26 PROCEDURE — 88341 IMHCHEM/IMCYTCHM EA ADD ANTB: CPT | Mod: 26

## 2018-07-26 PROCEDURE — 88341 IMHCHEM/IMCYTCHM EA ADD ANTB: CPT

## 2018-07-26 PROCEDURE — A9585: CPT

## 2018-07-26 PROCEDURE — 82565 ASSAY OF CREATININE: CPT

## 2018-07-26 PROCEDURE — A4648: CPT

## 2018-07-26 PROCEDURE — 19086 BX BREAST ADD LESION MR IMAG: CPT | Mod: RT

## 2018-07-26 PROCEDURE — 77066 DX MAMMO INCL CAD BI: CPT | Mod: 26

## 2018-07-26 PROCEDURE — 88305 TISSUE EXAM BY PATHOLOGIST: CPT | Mod: 26

## 2018-07-26 PROCEDURE — 19086 BX BREAST ADD LESION MR IMAG: CPT

## 2018-07-26 PROCEDURE — 19085 BX BREAST 1ST LESION MR IMAG: CPT

## 2018-08-03 ENCOUNTER — APPOINTMENT (OUTPATIENT)
Dept: HEMATOLOGY ONCOLOGY | Facility: CLINIC | Age: 68
End: 2018-08-03

## 2018-08-07 ENCOUNTER — OTHER (OUTPATIENT)
Age: 68
End: 2018-08-07

## 2018-08-07 ENCOUNTER — APPOINTMENT (OUTPATIENT)
Dept: SURGERY | Facility: CLINIC | Age: 68
End: 2018-08-07
Payer: MEDICARE

## 2018-08-07 ENCOUNTER — APPOINTMENT (OUTPATIENT)
Dept: BREAST CENTER | Facility: CLINIC | Age: 68
End: 2018-08-07
Payer: MEDICARE

## 2018-08-07 VITALS
DIASTOLIC BLOOD PRESSURE: 77 MMHG | HEART RATE: 72 BPM | OXYGEN SATURATION: 96 % | WEIGHT: 134.48 LBS | TEMPERATURE: 98.3 F | HEIGHT: 64 IN | SYSTOLIC BLOOD PRESSURE: 127 MMHG | BODY MASS INDEX: 22.96 KG/M2

## 2018-08-07 VITALS
HEIGHT: 64 IN | SYSTOLIC BLOOD PRESSURE: 134 MMHG | WEIGHT: 130 LBS | DIASTOLIC BLOOD PRESSURE: 82 MMHG | BODY MASS INDEX: 22.2 KG/M2

## 2018-08-07 DIAGNOSIS — Z13.79 ENCOUNTER FOR OTHER SCREENING FOR GENETIC AND CHROMOSOMAL ANOMALIES: ICD-10-CM

## 2018-08-07 PROCEDURE — 99215 OFFICE O/P EST HI 40 MIN: CPT

## 2018-08-08 ENCOUNTER — RESULT REVIEW (OUTPATIENT)
Age: 68
End: 2018-08-08

## 2018-08-08 ENCOUNTER — OUTPATIENT (OUTPATIENT)
Dept: OUTPATIENT SERVICES | Facility: HOSPITAL | Age: 68
LOS: 1 days | Discharge: HOME | End: 2018-08-08
Payer: MEDICARE

## 2018-08-08 DIAGNOSIS — D05.12 INTRADUCTAL CARCINOMA IN SITU OF LEFT BREAST: ICD-10-CM

## 2018-08-08 PROCEDURE — 88321 CONSLTJ&REPRT SLD PREP ELSWR: CPT

## 2018-08-10 LAB — SURGICAL PATHOLOGY FINAL REPORT - CH: SIGNIFICANT CHANGE UP

## 2018-08-23 LAB — SURGICAL PATHOLOGY FINAL REPORT - CH: SIGNIFICANT CHANGE UP

## 2018-08-29 ENCOUNTER — OUTPATIENT (OUTPATIENT)
Dept: OUTPATIENT SERVICES | Facility: HOSPITAL | Age: 68
LOS: 1 days | Discharge: HOME | End: 2018-08-29
Payer: MEDICARE

## 2018-08-29 DIAGNOSIS — Z90.89 ACQUIRED ABSENCE OF OTHER ORGANS: Chronic | ICD-10-CM

## 2018-08-29 DIAGNOSIS — D05.12 INTRADUCTAL CARCINOMA IN SITU OF LEFT BREAST: ICD-10-CM

## 2018-08-29 DIAGNOSIS — Z85.3 PERSONAL HISTORY OF MALIGNANT NEOPLASM OF BREAST: ICD-10-CM

## 2018-08-29 DIAGNOSIS — Z98.890 OTHER SPECIFIED POSTPROCEDURAL STATES: Chronic | ICD-10-CM

## 2018-08-29 DIAGNOSIS — D05.10 INTRADUCTAL CARCINOMA IN SITU OF UNSPECIFIED BREAST: ICD-10-CM

## 2018-08-29 DIAGNOSIS — Z96.7 PRESENCE OF OTHER BONE AND TENDON IMPLANTS: Chronic | ICD-10-CM

## 2018-08-29 LAB
ABO RH CONFIRMATION: SIGNIFICANT CHANGE UP
ALBUMIN SERPL ELPH-MCNC: 4.2 G/DL — SIGNIFICANT CHANGE UP (ref 3.3–5)
ALP SERPL-CCNC: 61 U/L — SIGNIFICANT CHANGE UP (ref 40–120)
ALT FLD-CCNC: 21 U/L — SIGNIFICANT CHANGE UP (ref 12–78)
ANION GAP SERPL CALC-SCNC: 4 MMOL/L — LOW (ref 5–17)
APPEARANCE UR: CLEAR — SIGNIFICANT CHANGE UP
APTT BLD: 30.1 SEC — SIGNIFICANT CHANGE UP (ref 27.5–37.4)
AST SERPL-CCNC: 20 U/L — SIGNIFICANT CHANGE UP (ref 15–37)
BACTERIA # UR AUTO: ABNORMAL
BASOPHILS # BLD AUTO: 0.05 K/UL — SIGNIFICANT CHANGE UP (ref 0–0.2)
BASOPHILS NFR BLD AUTO: 0.8 % — SIGNIFICANT CHANGE UP (ref 0–2)
BILIRUB SERPL-MCNC: 0.9 MG/DL — SIGNIFICANT CHANGE UP (ref 0.2–1.2)
BILIRUB UR-MCNC: NEGATIVE — SIGNIFICANT CHANGE UP
BLD GP AB SCN SERPL QL: SIGNIFICANT CHANGE UP
BUN SERPL-MCNC: 22 MG/DL — SIGNIFICANT CHANGE UP (ref 7–23)
CALCIUM SERPL-MCNC: 9 MG/DL — SIGNIFICANT CHANGE UP (ref 8.5–10.1)
CHLORIDE SERPL-SCNC: 106 MMOL/L — SIGNIFICANT CHANGE UP (ref 96–108)
CO2 SERPL-SCNC: 28 MMOL/L — SIGNIFICANT CHANGE UP (ref 22–31)
COLOR SPEC: YELLOW — SIGNIFICANT CHANGE UP
CREAT SERPL-MCNC: 0.59 MG/DL — SIGNIFICANT CHANGE UP (ref 0.5–1.3)
DIFF PNL FLD: ABNORMAL
EOSINOPHIL # BLD AUTO: 0.1 K/UL — SIGNIFICANT CHANGE UP (ref 0–0.5)
EOSINOPHIL NFR BLD AUTO: 1.6 % — SIGNIFICANT CHANGE UP (ref 0–6)
EPI CELLS # UR: SIGNIFICANT CHANGE UP
GLUCOSE SERPL-MCNC: 100 MG/DL — HIGH (ref 70–99)
GLUCOSE UR QL: NEGATIVE MG/DL — SIGNIFICANT CHANGE UP
HCT VFR BLD CALC: 40.5 % — SIGNIFICANT CHANGE UP (ref 34.5–45)
HGB BLD-MCNC: 13.4 G/DL — SIGNIFICANT CHANGE UP (ref 11.5–15.5)
IMM GRANULOCYTES NFR BLD AUTO: 0.3 % — SIGNIFICANT CHANGE UP (ref 0–1.5)
INR BLD: 1.1 RATIO — SIGNIFICANT CHANGE UP (ref 0.88–1.16)
KETONES UR-MCNC: NEGATIVE — SIGNIFICANT CHANGE UP
LEUKOCYTE ESTERASE UR-ACNC: ABNORMAL
LYMPHOCYTES # BLD AUTO: 1.86 K/UL — SIGNIFICANT CHANGE UP (ref 1–3.3)
LYMPHOCYTES # BLD AUTO: 29.2 % — SIGNIFICANT CHANGE UP (ref 13–44)
MCHC RBC-ENTMCNC: 30 PG — SIGNIFICANT CHANGE UP (ref 27–34)
MCHC RBC-ENTMCNC: 33.1 GM/DL — SIGNIFICANT CHANGE UP (ref 32–36)
MCV RBC AUTO: 90.6 FL — SIGNIFICANT CHANGE UP (ref 80–100)
MONOCYTES # BLD AUTO: 0.45 K/UL — SIGNIFICANT CHANGE UP (ref 0–0.9)
MONOCYTES NFR BLD AUTO: 7.1 % — SIGNIFICANT CHANGE UP (ref 2–14)
NEUTROPHILS # BLD AUTO: 3.9 K/UL — SIGNIFICANT CHANGE UP (ref 1.8–7.4)
NEUTROPHILS NFR BLD AUTO: 61 % — SIGNIFICANT CHANGE UP (ref 43–77)
NITRITE UR-MCNC: NEGATIVE — SIGNIFICANT CHANGE UP
NRBC # BLD: 0 /100 WBCS — SIGNIFICANT CHANGE UP (ref 0–0)
PH UR: 5 — SIGNIFICANT CHANGE UP (ref 5–8)
PLATELET # BLD AUTO: 183 K/UL — SIGNIFICANT CHANGE UP (ref 150–400)
POTASSIUM SERPL-MCNC: 4.7 MMOL/L — SIGNIFICANT CHANGE UP (ref 3.5–5.3)
POTASSIUM SERPL-SCNC: 4.7 MMOL/L — SIGNIFICANT CHANGE UP (ref 3.5–5.3)
PROT SERPL-MCNC: 8 GM/DL — SIGNIFICANT CHANGE UP (ref 6–8.3)
PROT UR-MCNC: NEGATIVE MG/DL — SIGNIFICANT CHANGE UP
PROTHROM AB SERPL-ACNC: 11.9 SEC — SIGNIFICANT CHANGE UP (ref 9.8–12.7)
RBC # BLD: 4.47 M/UL — SIGNIFICANT CHANGE UP (ref 3.8–5.2)
RBC # FLD: 13.8 % — SIGNIFICANT CHANGE UP (ref 10.3–14.5)
RBC CASTS # UR COMP ASSIST: ABNORMAL /HPF (ref 0–4)
SODIUM SERPL-SCNC: 138 MMOL/L — SIGNIFICANT CHANGE UP (ref 135–145)
SP GR SPEC: 1.02 — SIGNIFICANT CHANGE UP (ref 1.01–1.02)
TYPE + AB SCN PNL BLD: SIGNIFICANT CHANGE UP
UROBILINOGEN FLD QL: NEGATIVE MG/DL — SIGNIFICANT CHANGE UP
WBC # BLD: 6.38 K/UL — SIGNIFICANT CHANGE UP (ref 3.8–10.5)
WBC # FLD AUTO: 6.38 K/UL — SIGNIFICANT CHANGE UP (ref 3.8–10.5)
WBC UR QL: ABNORMAL

## 2018-08-29 PROCEDURE — 93010 ELECTROCARDIOGRAM REPORT: CPT

## 2018-08-29 PROCEDURE — 71046 X-RAY EXAM CHEST 2 VIEWS: CPT | Mod: 26

## 2018-08-29 NOTE — PATIENT PROFILE ADULT. - PMH
Anxiety    Bladder prolapse, female, acquired    Breast cancer  b/l  Breast cancer in female  right - 1998  GERD (gastroesophageal reflux disease)    IBS (irritable bowel syndrome)    Osteoarthritis    Osteoporosis    Rheumatoid arthritis    Shingles  2015  Spinal stenosis    Thyroid nodule  benign - removed  Uterine prolapse  Pessary

## 2018-08-29 NOTE — PATIENT PROFILE ADULT. - PSH
H/O ovarian cystectomy    H/O partial thyroidectomy    History of tonsillectomy    S/P breast lumpectomy  right 1998  S/P ORIF (open reduction internal fixation) fracture  right wrist 07/2017

## 2018-08-29 NOTE — PATIENT PROFILE ADULT. - FAMILY HISTORY
Mother  Still living? Unknown  Family history of lung cancer, Age at diagnosis: Age Unknown     Father  Still living? Unknown  Family history of bladder cancer, Age at diagnosis: Age Unknown     Sibling  Still living? Unknown  Family history of myelofibrosis, Age at diagnosis: Age Unknown

## 2018-08-29 NOTE — CHART NOTE - NSCHARTNOTEFT_GEN_A_CORE
Plan  1. Stop all NSAIDS, herbal supplements and vitamins for 7 days.  2. NPO at midnight.  3. Take the following medications ( valium ) with small sips of water on the morning of your procedure/surgery.  4. Use EZ sponges as directed  5. Use mupirocin as directed Plan  1. Stop all NSAIDS, herbal supplements and vitamins for 7 days.  2. NPO at midnight.  3. Take the following medications ( valium ) with small sips of water on the morning of your procedure/surgery.  4. Use EZ sponges as directed  5. Use mupirocin as directed    /87 T 98.2  P 68  R 20  O2sat 100%    CAPRINI SCORE [CLOT]    AGE RELATED RISK FACTORS                                                       MOBILITY RELATED FACTORS  [ ] Age 41-60 years                                            (1 Point)                  [ ] Bed rest                                                        (1 Point)  [x ] Age: 61-74 years                                           (2 Points)                 [ ] Plaster cast                                                   (2 Points)  [ ] Age= 75 years                                              (3 Points)                 [ ] Bed bound for more than 72 hours                 (2 Points)    DISEASE RELATED RISK FACTORS                                               GENDER SPECIFIC FACTORS  [ ] Edema in the lower extremities                       (1 Point)                  [ ] Pregnancy                                                     (1 Point)  [ ] Varicose veins                                               (1 Point)                  [ ] Post-partum < 6 weeks                                   (1 Point)             [x ] BMI > 25 Kg/m2                                            (1 Point)                  [ ] Hormonal therapy  or oral contraception          (1 Point)                 [ ] Sepsis (in the previous month)                        (1 Point)                  [ ] History of pregnancy complications                 (1 point)  [ ] Pneumonia or serious lung disease                                               [ ] Unexplained or recurrent                     (1 Point)           (in the previous month)                               (1 Point)  [ ] Abnormal pulmonary function test                     (1 Point)                 SURGERY RELATED RISK FACTORS  [ ] Acute myocardial infarction                              (1 Point)                 [ ]  Section                                             (1 Point)  [ ] Congestive heart failure (in the previous month)  (1 Point)               [ ] Minor surgery                                                  (1 Point)   [ x] Inflammatory bowel disease                             (1 Point)                 [ ] Arthroscopic surgery                                        (2 Points)  [ ] Central venous access                                      (2 Points)                [x ] General surgery lasting more than 45 minutes   (2 Points)       [ ] Stroke (in the previous month)                          (5 Points)               [ ] Elective arthroplasty                                         (5 Points)                                                                                                                                               HEMATOLOGY RELATED FACTORS                                                 TRAUMA RELATED RISK FACTORS  [ ] Prior episodes of VTE                                     (3 Points)                 [ ] Fracture of the hip, pelvis, or leg                       (5 Points)  [ ] Positive family history for VTE                         (3 Points)                 [ ] Acute spinal cord injury (in the previous month)  (5 Points)  [ ] Prothrombin 86454 A                                     (3 Points)                 [ ] Paralysis  (less than 1 month)                             (5 Points)  [ ] Factor V Leiden                                             (3 Points)                  [ ] Multiple Trauma within 1 month                        (5 Points)  [ ] Lupus anticoagulants                                     (3 Points)                                                           [ ] Anticardiolipin antibodies                               (3 Points)                                                       [ ] High homocysteine in the blood                      (3 Points)                                             [ ] Other congenital or acquired thrombophilia      (3 Points)                                                [ ] Heparin induced thrombocytopenia                  (3 Points)                                          Total Score [     6     ]    The Caprini score indicates that this patient is at high risk for a VTE event (score => 6).    Ssurgical patients in this group will benefit from both pharmacologic prophylaxis and intermittent compression devices.    The surgical team will determine the balance between VTE risk and bleeding risk, and other clinical considerations. Plan  1. Stop all NSAIDS, herbal supplements and vitamins for 7 days.  2. NPO at midnight.  3. Take the following medications ( valium ) with small sips of water on the morning of your procedure/surgery.  4. Use EZ sponges as directed  5. Use mupirocin as directed    /87 T 98.2  P 68  R 20  O2sat 100%    CAPRINI SCORE [CLOT]    AGE RELATED RISK FACTORS                                                       MOBILITY RELATED FACTORS  [ ] Age 41-60 years                                            (1 Point)                  [ ] Bed rest                                                        (1 Point)  [x ] Age: 61-74 years                                           (2 Points)                 [ ] Plaster cast                                                   (2 Points)  [ ] Age= 75 years                                              (3 Points)                 [ ] Bed bound for more than 72 hours                 (2 Points)    DISEASE RELATED RISK FACTORS                                               GENDER SPECIFIC FACTORS  [ ] Edema in the lower extremities                       (1 Point)                  [ ] Pregnancy                                                     (1 Point)  [ ] Varicose veins                                               (1 Point)                  [ ] Post-partum < 6 weeks                                   (1 Point)             [ ] BMI > 25 Kg/m2                                            (1 Point)                  [ ] Hormonal therapy  or oral contraception          (1 Point)                 [ ] Sepsis (in the previous month)                        (1 Point)                  [ ] History of pregnancy complications                 (1 point)  [ ] Pneumonia or serious lung disease                                               [ ] Unexplained or recurrent                     (1 Point)           (in the previous month)                               (1 Point)  [ ] Abnormal pulmonary function test                     (1 Point)                 SURGERY RELATED RISK FACTORS  [ ] Acute myocardial infarction                              (1 Point)                 [ ]  Section                                             (1 Point)  [ ] Congestive heart failure (in the previous month)  (1 Point)               [ ] Minor surgery                                                  (1 Point)   [ ] Inflammatory bowel disease                             (1 Point)                 [ ] Arthroscopic surgery                                        (2 Points)  [ ] Central venous access                                      (2 Points)                [x ] General surgery lasting more than 45 minutes   (2 Points)       [ ] Stroke (in the previous month)                          (5 Points)               [ ] Elective arthroplasty                                         (5 Points)                                                                                                                                               HEMATOLOGY RELATED FACTORS                                                 TRAUMA RELATED RISK FACTORS  [ ] Prior episodes of VTE                                     (3 Points)                 [ ] Fracture of the hip, pelvis, or leg                       (5 Points)  [ ] Positive family history for VTE                         (3 Points)                 [ ] Acute spinal cord injury (in the previous month)  (5 Points)  [ ] Prothrombin 47704 A                                     (3 Points)                 [ ] Paralysis  (less than 1 month)                             (5 Points)  [ ] Factor V Leiden                                             (3 Points)                  [ ] Multiple Trauma within 1 month                        (5 Points)  [ ] Lupus anticoagulants                                     (3 Points)                                                           [ ] Anticardiolipin antibodies                               (3 Points)                                                       [ ] High homocysteine in the blood                      (3 Points)                                             [ ] Other congenital or acquired thrombophilia      (3 Points)                                                [ ] Heparin induced thrombocytopenia                  (3 Points)                                          Total Score [    4   ]    The Caprini score indicates this patient is at risk for a VTE event (score 3-5).  Most surgical patients in this group would benefit from pharmacologic prophylaxis.  The surgical team will determine the balance between VTE risk and bleeding risk

## 2018-08-30 PROBLEM — N81.4 UTEROVAGINAL PROLAPSE, UNSPECIFIED: Chronic | Status: ACTIVE | Noted: 2018-08-29

## 2018-08-30 PROBLEM — M06.9 RHEUMATOID ARTHRITIS, UNSPECIFIED: Chronic | Status: INACTIVE | Noted: 2017-08-06 | Resolved: 2018-08-29

## 2018-09-06 RX ORDER — NITROFURANTOIN MACROCRYSTAL 50 MG
1 CAPSULE ORAL
Qty: 0 | Refills: 0 | COMMUNITY
Start: 2018-09-06 | End: 2018-09-13

## 2018-09-07 ENCOUNTER — CLINICAL ADVICE (OUTPATIENT)
Age: 68
End: 2018-09-07

## 2018-09-07 RX ORDER — MUPIROCIN 20 MG/G
1 OINTMENT TOPICAL
Qty: 0 | Refills: 0 | DISCHARGE
Start: 2018-09-07 | End: 2018-09-11

## 2018-09-10 RX ORDER — FAMOTIDINE 10 MG/ML
20 INJECTION INTRAVENOUS ONCE
Qty: 0 | Refills: 0 | Status: COMPLETED | OUTPATIENT
Start: 2018-09-11 | End: 2018-09-11

## 2018-09-10 RX ORDER — ACETAMINOPHEN 500 MG
975 TABLET ORAL ONCE
Qty: 0 | Refills: 0 | Status: COMPLETED | OUTPATIENT
Start: 2018-09-11 | End: 2018-09-11

## 2018-09-10 RX ORDER — SODIUM CHLORIDE 9 MG/ML
3 INJECTION INTRAMUSCULAR; INTRAVENOUS; SUBCUTANEOUS EVERY 8 HOURS
Qty: 0 | Refills: 0 | Status: DISCONTINUED | OUTPATIENT
Start: 2018-09-11 | End: 2018-09-13

## 2018-09-10 RX ORDER — OXYCODONE HYDROCHLORIDE 5 MG/1
10 TABLET ORAL ONCE
Qty: 0 | Refills: 0 | Status: DISCONTINUED | OUTPATIENT
Start: 2018-09-11 | End: 2018-09-11

## 2018-09-11 ENCOUNTER — RESULT REVIEW (OUTPATIENT)
Age: 68
End: 2018-09-11

## 2018-09-11 ENCOUNTER — INPATIENT (INPATIENT)
Facility: HOSPITAL | Age: 68
LOS: 1 days | Discharge: HOME | End: 2018-09-13
Attending: SURGERY | Admitting: SURGERY
Payer: MEDICARE

## 2018-09-11 ENCOUNTER — APPOINTMENT (OUTPATIENT)
Dept: BREAST CENTER | Facility: HOSPITAL | Age: 68
End: 2018-09-11
Payer: MEDICARE

## 2018-09-11 VITALS
SYSTOLIC BLOOD PRESSURE: 131 MMHG | OXYGEN SATURATION: 100 % | HEART RATE: 72 BPM | DIASTOLIC BLOOD PRESSURE: 89 MMHG | WEIGHT: 132.06 LBS | RESPIRATION RATE: 14 BRPM | HEIGHT: 64 IN | TEMPERATURE: 98 F

## 2018-09-11 DIAGNOSIS — Z98.890 OTHER SPECIFIED POSTPROCEDURAL STATES: Chronic | ICD-10-CM

## 2018-09-11 DIAGNOSIS — Z96.7 PRESENCE OF OTHER BONE AND TENDON IMPLANTS: Chronic | ICD-10-CM

## 2018-09-11 DIAGNOSIS — Z90.89 ACQUIRED ABSENCE OF OTHER ORGANS: Chronic | ICD-10-CM

## 2018-09-11 PROCEDURE — 88307 TISSUE EXAM BY PATHOLOGIST: CPT | Mod: 26

## 2018-09-11 PROCEDURE — 19303 MAST SIMPLE COMPLETE: CPT | Mod: AS,50

## 2018-09-11 PROCEDURE — 38525 BIOPSY/REMOVAL LYMPH NODES: CPT | Mod: 50

## 2018-09-11 PROCEDURE — 88341 IMHCHEM/IMCYTCHM EA ADD ANTB: CPT | Mod: 26

## 2018-09-11 PROCEDURE — 88305 TISSUE EXAM BY PATHOLOGIST: CPT | Mod: 26

## 2018-09-11 PROCEDURE — 38792 RA TRACER ID OF SENTINL NODE: CPT | Mod: 50

## 2018-09-11 PROCEDURE — 88329 PATH CONSLTJ DRG SURG: CPT

## 2018-09-11 PROCEDURE — 19303 MAST SIMPLE COMPLETE: CPT | Mod: 50

## 2018-09-11 PROCEDURE — 88342 IMHCHEM/IMCYTCHM 1ST ANTB: CPT | Mod: 26

## 2018-09-11 RX ORDER — FENTANYL CITRATE 50 UG/ML
50 INJECTION INTRAVENOUS
Qty: 0 | Refills: 0 | Status: DISCONTINUED | OUTPATIENT
Start: 2018-09-11 | End: 2018-09-11

## 2018-09-11 RX ORDER — METOCLOPRAMIDE HCL 10 MG
10 TABLET ORAL ONCE
Qty: 0 | Refills: 0 | Status: DISCONTINUED | OUTPATIENT
Start: 2018-09-11 | End: 2018-09-13

## 2018-09-11 RX ORDER — CEFAZOLIN SODIUM 1 G
2000 VIAL (EA) INJECTION EVERY 8 HOURS
Qty: 0 | Refills: 0 | Status: COMPLETED | OUTPATIENT
Start: 2018-09-11 | End: 2018-09-12

## 2018-09-11 RX ORDER — HEPARIN SODIUM 5000 [USP'U]/ML
5000 INJECTION INTRAVENOUS; SUBCUTANEOUS EVERY 12 HOURS
Qty: 0 | Refills: 0 | Status: DISCONTINUED | OUTPATIENT
Start: 2018-09-11 | End: 2018-09-13

## 2018-09-11 RX ORDER — HYDROMORPHONE HYDROCHLORIDE 2 MG/ML
0.5 INJECTION INTRAMUSCULAR; INTRAVENOUS; SUBCUTANEOUS
Qty: 0 | Refills: 0 | Status: DISCONTINUED | OUTPATIENT
Start: 2018-09-11 | End: 2018-09-13

## 2018-09-11 RX ORDER — INFLUENZA VIRUS VACCINE 15; 15; 15; 15 UG/.5ML; UG/.5ML; UG/.5ML; UG/.5ML
0.5 SUSPENSION INTRAMUSCULAR ONCE
Qty: 0 | Refills: 0 | Status: COMPLETED | OUTPATIENT
Start: 2018-09-11 | End: 2018-09-13

## 2018-09-11 RX ORDER — HYDROMORPHONE HYDROCHLORIDE 2 MG/ML
0.5 INJECTION INTRAMUSCULAR; INTRAVENOUS; SUBCUTANEOUS
Qty: 0 | Refills: 0 | Status: DISCONTINUED | OUTPATIENT
Start: 2018-09-11 | End: 2018-09-11

## 2018-09-11 RX ORDER — ACETAMINOPHEN 500 MG
975 TABLET ORAL EVERY 8 HOURS
Qty: 0 | Refills: 0 | Status: DISCONTINUED | OUTPATIENT
Start: 2018-09-12 | End: 2018-09-13

## 2018-09-11 RX ORDER — ONDANSETRON 8 MG/1
4 TABLET, FILM COATED ORAL EVERY 4 HOURS
Qty: 0 | Refills: 0 | Status: DISCONTINUED | OUTPATIENT
Start: 2018-09-11 | End: 2018-09-11

## 2018-09-11 RX ORDER — OXYCODONE HYDROCHLORIDE 5 MG/1
5 TABLET ORAL EVERY 4 HOURS
Qty: 0 | Refills: 0 | Status: DISCONTINUED | OUTPATIENT
Start: 2018-09-11 | End: 2018-09-13

## 2018-09-11 RX ORDER — ACETAMINOPHEN 500 MG
1000 TABLET ORAL ONCE
Qty: 0 | Refills: 0 | Status: COMPLETED | OUTPATIENT
Start: 2018-09-11 | End: 2018-09-11

## 2018-09-11 RX ORDER — ACETAMINOPHEN 500 MG
1000 TABLET ORAL ONCE
Qty: 0 | Refills: 0 | Status: COMPLETED | OUTPATIENT
Start: 2018-09-12 | End: 2018-09-12

## 2018-09-11 RX ORDER — SODIUM CHLORIDE 9 MG/ML
1000 INJECTION, SOLUTION INTRAVENOUS
Qty: 0 | Refills: 0 | Status: DISCONTINUED | OUTPATIENT
Start: 2018-09-11 | End: 2018-09-11

## 2018-09-11 RX ORDER — OXYCODONE HYDROCHLORIDE 5 MG/1
10 TABLET ORAL EVERY 4 HOURS
Qty: 0 | Refills: 0 | Status: DISCONTINUED | OUTPATIENT
Start: 2018-09-11 | End: 2018-09-13

## 2018-09-11 RX ORDER — PROCHLORPERAZINE MALEATE 5 MG
10 TABLET ORAL ONCE
Qty: 0 | Refills: 0 | Status: COMPLETED | OUTPATIENT
Start: 2018-09-11 | End: 2018-09-11

## 2018-09-11 RX ORDER — DOCUSATE SODIUM 100 MG
100 CAPSULE ORAL THREE TIMES A DAY
Qty: 0 | Refills: 0 | Status: DISCONTINUED | OUTPATIENT
Start: 2018-09-11 | End: 2018-09-13

## 2018-09-11 RX ORDER — SODIUM CHLORIDE 9 MG/ML
1000 INJECTION, SOLUTION INTRAVENOUS
Qty: 0 | Refills: 0 | Status: DISCONTINUED | OUTPATIENT
Start: 2018-09-11 | End: 2018-09-13

## 2018-09-11 RX ORDER — PHENOL/SODIUM PHENOLATE
1 AEROSOL, SPRAY (ML) MUCOUS MEMBRANE EVERY 4 HOURS
Qty: 0 | Refills: 0 | Status: DISCONTINUED | OUTPATIENT
Start: 2018-09-11 | End: 2018-09-13

## 2018-09-11 RX ADMIN — OXYCODONE HYDROCHLORIDE 10 MILLIGRAM(S): 5 TABLET ORAL at 07:17

## 2018-09-11 RX ADMIN — Medication 400 MILLIGRAM(S): at 22:23

## 2018-09-11 RX ADMIN — Medication 1 SPRAY(S): at 18:52

## 2018-09-11 RX ADMIN — SODIUM CHLORIDE 3 MILLILITER(S): 9 INJECTION INTRAMUSCULAR; INTRAVENOUS; SUBCUTANEOUS at 21:54

## 2018-09-11 RX ADMIN — Medication 100 MILLIGRAM(S): at 22:03

## 2018-09-11 RX ADMIN — Medication 10 MILLIGRAM(S): at 15:24

## 2018-09-11 RX ADMIN — Medication 975 MILLIGRAM(S): at 07:17

## 2018-09-11 RX ADMIN — SODIUM CHLORIDE 100 MILLILITER(S): 9 INJECTION, SOLUTION INTRAVENOUS at 22:23

## 2018-09-11 RX ADMIN — OXYCODONE HYDROCHLORIDE 5 MILLIGRAM(S): 5 TABLET ORAL at 22:09

## 2018-09-11 RX ADMIN — HEPARIN SODIUM 5000 UNIT(S): 5000 INJECTION INTRAVENOUS; SUBCUTANEOUS at 22:02

## 2018-09-11 RX ADMIN — OXYCODONE HYDROCHLORIDE 5 MILLIGRAM(S): 5 TABLET ORAL at 18:51

## 2018-09-11 RX ADMIN — FAMOTIDINE 20 MILLIGRAM(S): 10 INJECTION INTRAVENOUS at 07:16

## 2018-09-11 NOTE — BRIEF OPERATIVE NOTE - POST-OP DX
Ductal carcinoma in situ (DCIS) of left breast  09/11/2018    Active  Miladys Mcguire  Ductal carcinoma in situ (DCIS) of right breast  09/11/2018    Active  Miladys Mcguire

## 2018-09-12 ENCOUNTER — TRANSCRIPTION ENCOUNTER (OUTPATIENT)
Age: 68
End: 2018-09-12

## 2018-09-12 DIAGNOSIS — Z09 ENCOUNTER FOR FOLLOW-UP EXAMINATION AFTER COMPLETED TREATMENT FOR CONDITIONS OTHER THAN MALIGNANT NEOPLASM: ICD-10-CM

## 2018-09-12 DIAGNOSIS — C50.911 MALIGNANT NEOPLASM OF UNSPECIFIED SITE OF RIGHT FEMALE BREAST: ICD-10-CM

## 2018-09-12 RX ORDER — DOCUSATE SODIUM 100 MG
1 CAPSULE ORAL
Qty: 0 | Refills: 0 | COMMUNITY
Start: 2018-09-12

## 2018-09-12 RX ORDER — OXYCODONE HYDROCHLORIDE 5 MG/1
1 TABLET ORAL
Qty: 0 | Refills: 0 | COMMUNITY
Start: 2018-09-12

## 2018-09-12 RX ORDER — SODIUM CHLORIDE 0.65 %
1 AEROSOL, SPRAY (ML) NASAL EVERY 4 HOURS
Qty: 0 | Refills: 0 | Status: DISCONTINUED | OUTPATIENT
Start: 2018-09-12 | End: 2018-09-13

## 2018-09-12 RX ADMIN — Medication 100 MILLIGRAM(S): at 05:06

## 2018-09-12 RX ADMIN — Medication 975 MILLIGRAM(S): at 14:51

## 2018-09-12 RX ADMIN — OXYCODONE HYDROCHLORIDE 10 MILLIGRAM(S): 5 TABLET ORAL at 13:51

## 2018-09-12 RX ADMIN — OXYCODONE HYDROCHLORIDE 10 MILLIGRAM(S): 5 TABLET ORAL at 15:20

## 2018-09-12 RX ADMIN — Medication 1 SPRAY(S): at 12:06

## 2018-09-12 RX ADMIN — Medication 100 MILLIGRAM(S): at 22:08

## 2018-09-12 RX ADMIN — HEPARIN SODIUM 5000 UNIT(S): 5000 INJECTION INTRAVENOUS; SUBCUTANEOUS at 05:06

## 2018-09-12 RX ADMIN — SODIUM CHLORIDE 3 MILLILITER(S): 9 INJECTION INTRAMUSCULAR; INTRAVENOUS; SUBCUTANEOUS at 05:05

## 2018-09-12 RX ADMIN — OXYCODONE HYDROCHLORIDE 5 MILLIGRAM(S): 5 TABLET ORAL at 10:30

## 2018-09-12 RX ADMIN — OXYCODONE HYDROCHLORIDE 5 MILLIGRAM(S): 5 TABLET ORAL at 09:32

## 2018-09-12 RX ADMIN — OXYCODONE HYDROCHLORIDE 5 MILLIGRAM(S): 5 TABLET ORAL at 05:06

## 2018-09-12 RX ADMIN — OXYCODONE HYDROCHLORIDE 10 MILLIGRAM(S): 5 TABLET ORAL at 01:59

## 2018-09-12 RX ADMIN — OXYCODONE HYDROCHLORIDE 10 MILLIGRAM(S): 5 TABLET ORAL at 18:50

## 2018-09-12 RX ADMIN — SODIUM CHLORIDE 3 MILLILITER(S): 9 INJECTION INTRAMUSCULAR; INTRAVENOUS; SUBCUTANEOUS at 15:17

## 2018-09-12 RX ADMIN — Medication 1 SPRAY(S): at 16:46

## 2018-09-12 RX ADMIN — Medication 100 MILLIGRAM(S): at 05:05

## 2018-09-12 RX ADMIN — SODIUM CHLORIDE 3 MILLILITER(S): 9 INJECTION INTRAMUSCULAR; INTRAVENOUS; SUBCUTANEOUS at 21:44

## 2018-09-12 RX ADMIN — Medication 100 MILLIGRAM(S): at 15:18

## 2018-09-12 RX ADMIN — OXYCODONE HYDROCHLORIDE 10 MILLIGRAM(S): 5 TABLET ORAL at 22:52

## 2018-09-12 RX ADMIN — HEPARIN SODIUM 5000 UNIT(S): 5000 INJECTION INTRAVENOUS; SUBCUTANEOUS at 18:51

## 2018-09-12 RX ADMIN — Medication 400 MILLIGRAM(S): at 06:22

## 2018-09-12 RX ADMIN — Medication 975 MILLIGRAM(S): at 22:08

## 2018-09-12 NOTE — DISCHARGE NOTE ADULT - PATIENT PORTAL LINK FT
You can access the [facility] Patient Portal, offered by [facility], by registering with the following website: [email or web address]

## 2018-09-12 NOTE — PROGRESS NOTE ADULT - ASSESSMENT
Stable POD 1  Healing well  Continue drains  Remove Kolb catheter  Ambulate  Discharge planning possibly for later today  May shower at home  Follow-up Tuesday with me

## 2018-09-12 NOTE — PROGRESS NOTE ADULT - SUBJECTIVE AND OBJECTIVE BOX
NAD  Afebrile, VSS  Bilateral breast closures intact. Skin and NA viable.  Implants in good position.  Drains serosang.

## 2018-09-12 NOTE — PROGRESS NOTE ADULT - SUBJECTIVE AND OBJECTIVE BOX
S:  Comfortable.  Ambulated this morning.       Does not yet feel ready to go home.     O: Afebrile, VSS       Wounds clean.  NAC and skin viable.       Drains serosang

## 2018-09-12 NOTE — DISCHARGE NOTE ADULT - CARE PLAN
Principal Discharge DX:	Bilateral malignant neoplasm of breast in female, unspecified estrogen receptor status, unspecified site of breast  Goal:	postoperative recovery  Assessment and plan of treatment:	empty and record drain output every 12-24 hours  Ambulate, deep breathe

## 2018-09-12 NOTE — DISCHARGE NOTE ADULT - CONDITIONS AT DISCHARGE
pt is alert and oriented, pt has been educated about the brennen drains and surgical incision wound care

## 2018-09-12 NOTE — DISCHARGE NOTE ADULT - MEDICATION SUMMARY - MEDICATIONS TO TAKE
I will START or STAY ON the medications listed below when I get home from the hospital:    Tylenol 500 mg oral tablet  -- 2 tab(s) by mouth once a day, As Needed  -- Indication: For Pain    oxyCODONE 5 mg oral tablet  -- 1 tab(s) by mouth every 4 hours, As needed, Mild Pain (1 - 3)  -- Indication: For Pain    oxyCODONE 10 mg oral tablet  -- 1 tab(s) by mouth every 4 hours, As needed, Moderate Pain (4 - 6)  -- Indication: For Pain    gabapentin 300 mg oral capsule  -- 1 cap(s) by mouth once a day (at bedtime), As Needed  -- Indication: For CNS    Valium 5 mg oral tablet  -- 1 tab(s) by mouth once a day, As Needed  -- Indication: For anxiety    Bactroban 2% nasal ointment  -- 1 application into nose 2 times a day  -- Indication: For antibiotic    docusate sodium 100 mg oral capsule  -- 1 cap(s) by mouth 3 times a day  -- Indication: For constipation    nitrofurantoin macrocrystals-monohydrate 100 mg oral capsule  -- 1 cap(s) by mouth 2 times a day  -- Indication: For urinary

## 2018-09-12 NOTE — DISCHARGE NOTE ADULT - CARE PROVIDER_API CALL
Miladys Lopez), Surgery  [address] [place] [address]  Phone: [phone]  Fax: [phone]    Wyatt Shi), Plastic Surgery  [address] [place] [address]  [address]  [place] [address]  Phone: [phone]  Fax: [phone]

## 2018-09-13 VITALS
SYSTOLIC BLOOD PRESSURE: 114 MMHG | DIASTOLIC BLOOD PRESSURE: 68 MMHG | HEART RATE: 73 BPM | OXYGEN SATURATION: 95 % | TEMPERATURE: 100 F | RESPIRATION RATE: 18 BRPM

## 2018-09-13 RX ADMIN — Medication 975 MILLIGRAM(S): at 06:48

## 2018-09-13 RX ADMIN — HEPARIN SODIUM 5000 UNIT(S): 5000 INJECTION INTRAVENOUS; SUBCUTANEOUS at 06:18

## 2018-09-13 RX ADMIN — OXYCODONE HYDROCHLORIDE 10 MILLIGRAM(S): 5 TABLET ORAL at 06:48

## 2018-09-13 RX ADMIN — OXYCODONE HYDROCHLORIDE 10 MILLIGRAM(S): 5 TABLET ORAL at 04:23

## 2018-09-13 RX ADMIN — Medication 975 MILLIGRAM(S): at 00:55

## 2018-09-13 RX ADMIN — OXYCODONE HYDROCHLORIDE 10 MILLIGRAM(S): 5 TABLET ORAL at 00:55

## 2018-09-13 RX ADMIN — SODIUM CHLORIDE 3 MILLILITER(S): 9 INJECTION INTRAMUSCULAR; INTRAVENOUS; SUBCUTANEOUS at 06:48

## 2018-09-13 RX ADMIN — OXYCODONE HYDROCHLORIDE 10 MILLIGRAM(S): 5 TABLET ORAL at 08:29

## 2018-09-13 RX ADMIN — Medication 100 MILLIGRAM(S): at 06:18

## 2018-09-13 RX ADMIN — INFLUENZA VIRUS VACCINE 0.5 MILLILITER(S): 15; 15; 15; 15 SUSPENSION INTRAMUSCULAR at 06:18

## 2018-09-13 RX ADMIN — Medication 975 MILLIGRAM(S): at 06:18

## 2018-09-18 DIAGNOSIS — N65.1 DISPROPORTION OF RECONSTRUCTED BREAST: ICD-10-CM

## 2018-09-18 DIAGNOSIS — Z85.3 PERSONAL HISTORY OF MALIGNANT NEOPLASM OF BREAST: ICD-10-CM

## 2018-09-18 DIAGNOSIS — K21.9 GASTRO-ESOPHAGEAL REFLUX DISEASE WITHOUT ESOPHAGITIS: ICD-10-CM

## 2018-09-18 DIAGNOSIS — D05.12 INTRADUCTAL CARCINOMA IN SITU OF LEFT BREAST: ICD-10-CM

## 2018-09-18 DIAGNOSIS — D05.11 INTRADUCTAL CARCINOMA IN SITU OF RIGHT BREAST: ICD-10-CM

## 2018-09-18 DIAGNOSIS — M06.9 RHEUMATOID ARTHRITIS, UNSPECIFIED: ICD-10-CM

## 2018-09-18 DIAGNOSIS — E03.9 HYPOTHYROIDISM, UNSPECIFIED: ICD-10-CM

## 2018-09-18 LAB — SURGICAL PATHOLOGY FINAL REPORT - CH: SIGNIFICANT CHANGE UP

## 2018-09-18 NOTE — ASU PREOP CHECKLIST - WEIGHT IN KG
Faxed Certificate of Medical Necessity form to [facility] @ [phone]. Confirmation number [id number]. Original form placed in scan folder for central scanning. 59.9

## 2018-09-19 ENCOUNTER — APPOINTMENT (OUTPATIENT)
Dept: BREAST CENTER | Facility: CLINIC | Age: 68
End: 2018-09-19
Payer: MEDICARE

## 2018-09-19 VITALS
SYSTOLIC BLOOD PRESSURE: 122 MMHG | DIASTOLIC BLOOD PRESSURE: 82 MMHG | HEART RATE: 68 BPM | BODY MASS INDEX: 22.53 KG/M2 | HEIGHT: 64 IN | WEIGHT: 132 LBS

## 2018-09-19 DIAGNOSIS — Z78.9 OTHER SPECIFIED HEALTH STATUS: ICD-10-CM

## 2018-09-19 PROBLEM — K21.9 GASTRO-ESOPHAGEAL REFLUX DISEASE WITHOUT ESOPHAGITIS: Chronic | Status: ACTIVE | Noted: 2018-08-29

## 2018-09-19 PROBLEM — N81.10 CYSTOCELE, UNSPECIFIED: Chronic | Status: ACTIVE | Noted: 2018-08-29

## 2018-09-19 PROBLEM — M48.00 SPINAL STENOSIS, SITE UNSPECIFIED: Chronic | Status: ACTIVE | Noted: 2018-08-29

## 2018-09-19 PROBLEM — E04.1 NONTOXIC SINGLE THYROID NODULE: Chronic | Status: ACTIVE | Noted: 2018-08-29

## 2018-09-19 PROBLEM — F41.9 ANXIETY DISORDER, UNSPECIFIED: Chronic | Status: ACTIVE | Noted: 2018-08-29

## 2018-09-19 PROBLEM — B02.9 ZOSTER WITHOUT COMPLICATIONS: Chronic | Status: ACTIVE | Noted: 2018-08-29

## 2018-09-19 PROCEDURE — 99024 POSTOP FOLLOW-UP VISIT: CPT

## 2018-09-21 ENCOUNTER — APPOINTMENT (OUTPATIENT)
Dept: BREAST CENTER | Facility: CLINIC | Age: 68
End: 2018-09-21
Payer: MEDICARE

## 2018-09-21 PROCEDURE — 99024 POSTOP FOLLOW-UP VISIT: CPT

## 2018-09-26 ENCOUNTER — APPOINTMENT (OUTPATIENT)
Dept: BREAST CENTER | Facility: CLINIC | Age: 68
End: 2018-09-26

## 2018-09-26 ENCOUNTER — CLINICAL ADVICE (OUTPATIENT)
Age: 68
End: 2018-09-26

## 2018-10-05 ENCOUNTER — APPOINTMENT (OUTPATIENT)
Dept: BREAST CENTER | Facility: CLINIC | Age: 68
End: 2018-10-05
Payer: MEDICARE

## 2018-10-05 VITALS
BODY MASS INDEX: 22.53 KG/M2 | HEART RATE: 64 BPM | DIASTOLIC BLOOD PRESSURE: 84 MMHG | SYSTOLIC BLOOD PRESSURE: 122 MMHG | HEIGHT: 64 IN | WEIGHT: 132 LBS

## 2018-10-05 PROCEDURE — 99024 POSTOP FOLLOW-UP VISIT: CPT

## 2018-12-04 NOTE — DISCHARGE NOTE ADULT - NS AS DC AMI YN
12/03/2018    Request for authorization   Hydrochlorothiazide 12.5 mg cap  Amlodipine-benazerpril 5-20 mg    no

## 2018-12-12 ENCOUNTER — APPOINTMENT (OUTPATIENT)
Dept: BREAST CENTER | Facility: CLINIC | Age: 68
End: 2018-12-12
Payer: MEDICARE

## 2018-12-12 VITALS — SYSTOLIC BLOOD PRESSURE: 149 MMHG | HEART RATE: 71 BPM | DIASTOLIC BLOOD PRESSURE: 91 MMHG

## 2018-12-12 VITALS — BODY MASS INDEX: 22.2 KG/M2 | WEIGHT: 130 LBS | HEIGHT: 64 IN

## 2018-12-12 PROCEDURE — 99213 OFFICE O/P EST LOW 20 MIN: CPT

## 2018-12-12 RX ORDER — ZOLPIDEM TARTRATE 5 MG/1
5 TABLET ORAL
Qty: 30 | Refills: 0 | Status: DISCONTINUED | COMMUNITY
Start: 2018-06-25 | End: 2018-12-12

## 2018-12-12 RX ORDER — NAPROXEN 500 MG/1
500 TABLET ORAL
Qty: 20 | Refills: 0 | Status: DISCONTINUED | COMMUNITY
Start: 2017-12-27 | End: 2018-12-12

## 2018-12-12 RX ORDER — CIPROFLOXACIN HYDROCHLORIDE 500 MG/1
500 TABLET, FILM COATED ORAL
Refills: 0 | Status: DISCONTINUED | COMMUNITY
End: 2018-12-12

## 2018-12-12 RX ORDER — OXYCODONE HYDROCHLORIDE AND ACETAMINOPHEN 10; 325 MG/1; MG/1
TABLET ORAL
Refills: 0 | Status: DISCONTINUED | COMMUNITY
End: 2018-12-12

## 2019-03-26 ENCOUNTER — APPOINTMENT (OUTPATIENT)
Dept: BREAST CENTER | Facility: CLINIC | Age: 69
End: 2019-03-26
Payer: MEDICARE

## 2019-03-26 VITALS
DIASTOLIC BLOOD PRESSURE: 77 MMHG | BODY MASS INDEX: 22.2 KG/M2 | SYSTOLIC BLOOD PRESSURE: 122 MMHG | HEIGHT: 64 IN | HEART RATE: 73 BPM | WEIGHT: 130 LBS

## 2019-03-26 PROCEDURE — 99213 OFFICE O/P EST LOW 20 MIN: CPT

## 2019-03-26 RX ORDER — DIAZEPAM 5 MG/1
5 TABLET ORAL
Qty: 56 | Refills: 0 | Status: DISCONTINUED | COMMUNITY
Start: 2018-06-25 | End: 2019-03-26

## 2019-03-26 RX ORDER — BACILLUS COAGULANS/INULIN 1B-250 MG
CAPSULE ORAL
Refills: 0 | Status: ACTIVE | COMMUNITY

## 2019-03-26 RX ORDER — UBIDECARENONE/VIT E ACET 100MG-5
50 MCG CAPSULE ORAL
Refills: 0 | Status: ACTIVE | COMMUNITY

## 2019-03-26 RX ORDER — ALPRAZOLAM 0.25 MG/1
0.25 TABLET ORAL
Qty: 30 | Refills: 0 | Status: DISCONTINUED | COMMUNITY
Start: 2018-06-18 | End: 2019-03-26

## 2019-03-26 RX ORDER — MULTIVIT-MIN/FOLIC/VIT K/LYCOP 400-300MCG
1000 TABLET ORAL DAILY
Refills: 0 | Status: ACTIVE | COMMUNITY

## 2019-03-26 RX ORDER — DIAZEPAM 5 MG/1
5 TABLET ORAL TWICE DAILY
Qty: 60 | Refills: 0 | Status: DISCONTINUED | COMMUNITY
Start: 2018-08-03 | End: 2019-03-26

## 2019-03-26 NOTE — PAST MEDICAL HISTORY
[Menarche Age ____] : age at menarche was [unfilled] [Menopause Age____] : age at menopause was [unfilled] [Total Preg ___] : G[unfilled] [FreeTextEntry6] : no [FreeTextEntry7] : no

## 2019-03-26 NOTE — PHYSICAL EXAM
[EOMI] : extra ocular movement intact [Sclera nonicteric] : sclera nonicteric [Supple] : supple [No Supraclavicular Adenopathy] : no supraclavicular adenopathy [No Cervical Adenopathy] : no cervical adenopathy [No Thyromegaly] : no thyromegaly [Clear to Auscultation Bilat] : clear to auscultation bilaterally [Asymmetrical] : asymmetrical [Bra Size: ___] : Bra Size: [unfilled] [No dominant masses] : no dominant masses in right breast  [No dominant masses] : no dominant masses left breast [No Nipple Retraction] : no left nipple retraction [No Nipple Discharge] : no left nipple discharge [No Axillary Lymphadenopathy] : no left axillary lymphadenopathy [Soft] : abdomen soft [Not Tender] : non-tender [de-identified] : Right breast appears smaller than left. [de-identified] : Implant. Inframammary scar.   Vertical scar 6:00. [de-identified] : Implant. Inframammary scar.

## 2019-03-26 NOTE — DATA REVIEWED
[FreeTextEntry1] : Pathology Left breast biopsy 1:00 N3= in situ mammary duct adenocarcinoma with apocrine features 3 mm in greatest dimension (IHC panel show preservation of myoepithelial layer and thus argue against invasive carcinoma),  ER negative, TN negative, Her 2 3+, Ki 67 12%\par \par Bilateral breast MRI 7/12/2018:  RIght 1.2 cm focal NME UI posterior, rec ultrasound and biopsy.  Left 1.2 cm clumped irregular enhancement  extends inferior/posterior/medial where 1.4 cm area noted, rec biopsy.\par \par Right breast ultrasound 7/18/2018: negative\par \par Pathology 7/26/2018:  Bilateral MRI guided core biopsies- Right= DCIS ER 1-4% TN 0\par                                                                                            Left= DCIS\par                                                                                             \par \par Post MRI biopsy films show the left clips to be 33 mm apart on the MLO and 20 mm apart on the cc views\par \par \par Surgical pathology 9/11/2018:  Right breast- 1.6 cm DCIS cribriform and solid, intermediate grade, < 1 mm from deep and anterior/superior margin, SLN x2 neg, intranipple tissue benign\par                                                  Left breast- DCIS 1.0 cm UOQ and 9 mm LIQ, 1 mm from anterior/superior specimen margin UOQ, SLN neg, intranipple tissue negative, inferior margin negative\par

## 2019-03-26 NOTE — HISTORY OF PRESENT ILLNESS
[FreeTextEntry1] : This is a 68 year old  female who has a history of stage I right breast cancer (1 cm mod diff, ER+VA-) in 1998 (age 48) treated with a lumpectomy, sentinel node biopsy, CMF x6 months and radiation.  She also took Evista for 5 years.\par \par She was found to have an abnormality on her left mammogram in June and had an ultrasound guided core biopsy that showed in situ cancer.  She then had a breast MRI.  There were additional findings in each breast for which MRI guided core biopsies were performed.  Both the right and left MRI guided biopsies showed DCIS.\par \par She underwent a bilateral nipple sparing mastectomy with sentinel node biopsies and direct to prepectoral implant reconstruction on 9/11/2018. She had bilateral DCIS with close margins (Left anterior superior 1mm and right anterior superior <1 mm). We have discussed re-excisions and presented her case at Breast conference with mixed opinions.\par \par She is scheduled for re-excision of the margins in April.\par \par She complains of a urinary infection.  She has had 4 episodes in the last year and she had a few macrobids at home that she took.  She would like a full prescription.  Dr. Yañez generally treats her, but she didn't want to call because he would want her to come in.

## 2019-03-26 NOTE — CONSULT LETTER
[Dear  ___] : Dear  [unfilled], [Consult Letter:] : I had the pleasure of evaluating your patient, [unfilled]. [Sincerely,] : Sincerely, [DrMalorie  ___] : Dr. GALLAGHER

## 2019-04-15 ENCOUNTER — OUTPATIENT (OUTPATIENT)
Dept: OUTPATIENT SERVICES | Facility: HOSPITAL | Age: 69
LOS: 1 days | Discharge: HOME | End: 2019-04-15
Payer: MEDICARE

## 2019-04-15 DIAGNOSIS — Z90.89 ACQUIRED ABSENCE OF OTHER ORGANS: Chronic | ICD-10-CM

## 2019-04-15 DIAGNOSIS — Z98.890 OTHER SPECIFIED POSTPROCEDURAL STATES: Chronic | ICD-10-CM

## 2019-04-15 DIAGNOSIS — D05.10 INTRADUCTAL CARCINOMA IN SITU OF UNSPECIFIED BREAST: ICD-10-CM

## 2019-04-15 DIAGNOSIS — Z96.7 PRESENCE OF OTHER BONE AND TENDON IMPLANTS: Chronic | ICD-10-CM

## 2019-04-15 DIAGNOSIS — Z90.13 ACQUIRED ABSENCE OF BILATERAL BREASTS AND NIPPLES: Chronic | ICD-10-CM

## 2019-04-15 LAB
ALBUMIN SERPL ELPH-MCNC: 4.1 G/DL — SIGNIFICANT CHANGE UP (ref 3.3–5)
ALP SERPL-CCNC: 68 U/L — SIGNIFICANT CHANGE UP (ref 40–120)
ALT FLD-CCNC: 22 U/L — SIGNIFICANT CHANGE UP (ref 12–78)
ANION GAP SERPL CALC-SCNC: 6 MMOL/L — SIGNIFICANT CHANGE UP (ref 5–17)
APPEARANCE UR: ABNORMAL
APTT BLD: 31.9 SEC — SIGNIFICANT CHANGE UP (ref 27.5–36.3)
AST SERPL-CCNC: 17 U/L — SIGNIFICANT CHANGE UP (ref 15–37)
BACTERIA # UR AUTO: ABNORMAL
BASOPHILS # BLD AUTO: 0.03 K/UL — SIGNIFICANT CHANGE UP (ref 0–0.2)
BASOPHILS NFR BLD AUTO: 0.5 % — SIGNIFICANT CHANGE UP (ref 0–2)
BILIRUB SERPL-MCNC: 0.6 MG/DL — SIGNIFICANT CHANGE UP (ref 0.2–1.2)
BILIRUB UR-MCNC: NEGATIVE — SIGNIFICANT CHANGE UP
BLD GP AB SCN SERPL QL: SIGNIFICANT CHANGE UP
BUN SERPL-MCNC: 21 MG/DL — SIGNIFICANT CHANGE UP (ref 7–23)
CALCIUM SERPL-MCNC: 8.9 MG/DL — SIGNIFICANT CHANGE UP (ref 8.5–10.1)
CHLORIDE SERPL-SCNC: 106 MMOL/L — SIGNIFICANT CHANGE UP (ref 96–108)
CO2 SERPL-SCNC: 27 MMOL/L — SIGNIFICANT CHANGE UP (ref 22–31)
COLOR SPEC: YELLOW — SIGNIFICANT CHANGE UP
CREAT SERPL-MCNC: 0.55 MG/DL — SIGNIFICANT CHANGE UP (ref 0.5–1.3)
DIFF PNL FLD: ABNORMAL
EOSINOPHIL # BLD AUTO: 0.14 K/UL — SIGNIFICANT CHANGE UP (ref 0–0.5)
EOSINOPHIL NFR BLD AUTO: 2.2 % — SIGNIFICANT CHANGE UP (ref 0–6)
EPI CELLS # UR: SIGNIFICANT CHANGE UP
GLUCOSE SERPL-MCNC: 90 MG/DL — SIGNIFICANT CHANGE UP (ref 70–99)
GLUCOSE UR QL: NEGATIVE MG/DL — SIGNIFICANT CHANGE UP
HCT VFR BLD CALC: 41.1 % — SIGNIFICANT CHANGE UP (ref 34.5–45)
HGB BLD-MCNC: 13.4 G/DL — SIGNIFICANT CHANGE UP (ref 11.5–15.5)
IMM GRANULOCYTES NFR BLD AUTO: 0.5 % — SIGNIFICANT CHANGE UP (ref 0–1.5)
INR BLD: 1.1 RATIO — SIGNIFICANT CHANGE UP (ref 0.88–1.16)
KETONES UR-MCNC: NEGATIVE — SIGNIFICANT CHANGE UP
LEUKOCYTE ESTERASE UR-ACNC: ABNORMAL
LYMPHOCYTES # BLD AUTO: 1.52 K/UL — SIGNIFICANT CHANGE UP (ref 1–3.3)
LYMPHOCYTES # BLD AUTO: 24.3 % — SIGNIFICANT CHANGE UP (ref 13–44)
MCHC RBC-ENTMCNC: 30.3 PG — SIGNIFICANT CHANGE UP (ref 27–34)
MCHC RBC-ENTMCNC: 32.6 GM/DL — SIGNIFICANT CHANGE UP (ref 32–36)
MCV RBC AUTO: 93 FL — SIGNIFICANT CHANGE UP (ref 80–100)
MONOCYTES # BLD AUTO: 0.5 K/UL — SIGNIFICANT CHANGE UP (ref 0–0.9)
MONOCYTES NFR BLD AUTO: 8 % — SIGNIFICANT CHANGE UP (ref 2–14)
NEUTROPHILS # BLD AUTO: 4.03 K/UL — SIGNIFICANT CHANGE UP (ref 1.8–7.4)
NEUTROPHILS NFR BLD AUTO: 64.5 % — SIGNIFICANT CHANGE UP (ref 43–77)
NITRITE UR-MCNC: NEGATIVE — SIGNIFICANT CHANGE UP
NRBC # BLD: 0 /100 WBCS — SIGNIFICANT CHANGE UP (ref 0–0)
PH UR: 6.5 — SIGNIFICANT CHANGE UP (ref 5–8)
PLATELET # BLD AUTO: 186 K/UL — SIGNIFICANT CHANGE UP (ref 150–400)
POTASSIUM SERPL-MCNC: 4.1 MMOL/L — SIGNIFICANT CHANGE UP (ref 3.5–5.3)
POTASSIUM SERPL-SCNC: 4.1 MMOL/L — SIGNIFICANT CHANGE UP (ref 3.5–5.3)
PROT SERPL-MCNC: 8.2 GM/DL — SIGNIFICANT CHANGE UP (ref 6–8.3)
PROT UR-MCNC: NEGATIVE MG/DL — SIGNIFICANT CHANGE UP
PROTHROM AB SERPL-ACNC: 12.3 SEC — SIGNIFICANT CHANGE UP (ref 10–12.9)
RBC # BLD: 4.42 M/UL — SIGNIFICANT CHANGE UP (ref 3.8–5.2)
RBC # FLD: 13.6 % — SIGNIFICANT CHANGE UP (ref 10.3–14.5)
RBC CASTS # UR COMP ASSIST: ABNORMAL /HPF (ref 0–4)
SODIUM SERPL-SCNC: 139 MMOL/L — SIGNIFICANT CHANGE UP (ref 135–145)
SP GR SPEC: 1.01 — SIGNIFICANT CHANGE UP (ref 1.01–1.02)
TYPE + AB SCN PNL BLD: SIGNIFICANT CHANGE UP
UROBILINOGEN FLD QL: NEGATIVE MG/DL — SIGNIFICANT CHANGE UP
WBC # BLD: 6.25 K/UL — SIGNIFICANT CHANGE UP (ref 3.8–10.5)
WBC # FLD AUTO: 6.25 K/UL — SIGNIFICANT CHANGE UP (ref 3.8–10.5)
WBC UR QL: >50

## 2019-04-15 PROCEDURE — 93010 ELECTROCARDIOGRAM REPORT: CPT

## 2019-04-15 RX ORDER — DIAZEPAM 5 MG
1 TABLET ORAL
Qty: 0 | Refills: 0 | COMMUNITY

## 2019-04-15 NOTE — ASU PATIENT PROFILE, ADULT - PSH
H/O bilateral mastectomy  with alloderm 9/ 2018  H/O ovarian cystectomy    H/O partial thyroidectomy    H/O rhinoplasty    History of tonsillectomy    S/P breast lumpectomy  right 1998  S/P ORIF (open reduction internal fixation) fracture  right wrist 07/2017

## 2019-04-15 NOTE — ASU PATIENT PROFILE, ADULT - PMH
Anxiety    Bladder prolapse, female, acquired    Breast cancer  b/l DCIS  Breast cancer in female  right - 1998 s/p chemo and radiation  GERD (gastroesophageal reflux disease)    Hiatal hernia    IBS (irritable bowel syndrome)    Osteoarthritis    Osteoporosis    Rheumatoid arthritis    Shingles  2015  Spinal stenosis    Thyroid nodule  benign - removed  Urinary incontinence    Uterine prolapse  Pessary  UTI (urinary tract infection)

## 2019-04-15 NOTE — CHART NOTE - NSCHARTNOTEFT_GEN_A_CORE
T F  BP  HR  RR  SpO2  % RA    68 year old female presents to PST for re-excision of mastectomy bilateral margin and bilateral breast delayed implant with alloderm breast reconstruciton   Plan:  1. Pre-surgical instructions given: NPO post midnight  2. EZ wash instructions and Mupirocin instructions  given with teach back  3.  Labs EKG CXR done as per surgeon request  4. Medical Evaluation with Dr Yañez T 98.5 F  /78  HR 67   RR 16   SpO2  100  % RA    68 year old female presents to PST for re-excision of mastectomy bilateral margin and bilateral breast delayed implant with alloderm breast reconstruciton   Plan:  1. Pre-surgical instructions given: NPO post midnight  2. EZ wash instructions and Mupirocin instructions  given with teach back  3.  Labs EKG CXR done as per surgeon request  4. Medical Evaluation with Dr Yañez  5. OR booking notified Anel Latex allergy

## 2019-04-16 PROBLEM — C50.919 MALIGNANT NEOPLASM OF UNSPECIFIED SITE OF UNSPECIFIED FEMALE BREAST: Chronic | Status: ACTIVE | Noted: 2017-08-06

## 2019-04-16 PROBLEM — C50.919 MALIGNANT NEOPLASM OF UNSPECIFIED SITE OF UNSPECIFIED FEMALE BREAST: Chronic | Status: ACTIVE | Noted: 2018-08-29

## 2019-04-16 LAB
CULTURE RESULTS: SIGNIFICANT CHANGE UP
SPECIMEN SOURCE: SIGNIFICANT CHANGE UP

## 2019-04-23 DIAGNOSIS — D05.10 INTRADUCTAL CARCINOMA IN SITU OF UNSPECIFIED BREAST: ICD-10-CM

## 2019-04-23 DIAGNOSIS — Z01.818 ENCOUNTER FOR OTHER PREPROCEDURAL EXAMINATION: ICD-10-CM

## 2019-04-24 RX ORDER — SODIUM CHLORIDE 9 MG/ML
3 INJECTION INTRAMUSCULAR; INTRAVENOUS; SUBCUTANEOUS EVERY 8 HOURS
Qty: 0 | Refills: 0 | Status: DISCONTINUED | OUTPATIENT
Start: 2019-04-25 | End: 2019-04-25

## 2019-04-24 RX ORDER — NITROFURANTOIN MACROCRYSTAL 50 MG
1 CAPSULE ORAL
Qty: 0 | Refills: 0 | DISCHARGE
Start: 2019-04-24 | End: 2019-04-30

## 2019-04-25 ENCOUNTER — RESULT REVIEW (OUTPATIENT)
Age: 69
End: 2019-04-25

## 2019-04-25 ENCOUNTER — OUTPATIENT (OUTPATIENT)
Dept: INPATIENT UNIT | Facility: HOSPITAL | Age: 69
LOS: 1 days | Discharge: HOME | End: 2019-04-25
Payer: MEDICARE

## 2019-04-25 ENCOUNTER — APPOINTMENT (OUTPATIENT)
Dept: BREAST CENTER | Facility: HOSPITAL | Age: 69
End: 2019-04-25
Payer: MEDICARE

## 2019-04-25 VITALS
SYSTOLIC BLOOD PRESSURE: 102 MMHG | DIASTOLIC BLOOD PRESSURE: 59 MMHG | RESPIRATION RATE: 18 BRPM | OXYGEN SATURATION: 97 % | TEMPERATURE: 98 F | HEART RATE: 62 BPM

## 2019-04-25 VITALS
DIASTOLIC BLOOD PRESSURE: 88 MMHG | HEART RATE: 65 BPM | RESPIRATION RATE: 16 BRPM | HEIGHT: 64 IN | WEIGHT: 134.92 LBS | TEMPERATURE: 98 F | SYSTOLIC BLOOD PRESSURE: 140 MMHG

## 2019-04-25 DIAGNOSIS — Z90.13 ACQUIRED ABSENCE OF BILATERAL BREASTS AND NIPPLES: Chronic | ICD-10-CM

## 2019-04-25 DIAGNOSIS — C50.919 MALIGNANT NEOPLASM OF UNSPECIFIED SITE OF UNSPECIFIED FEMALE BREAST: ICD-10-CM

## 2019-04-25 DIAGNOSIS — Z98.890 OTHER SPECIFIED POSTPROCEDURAL STATES: Chronic | ICD-10-CM

## 2019-04-25 DIAGNOSIS — Z90.89 ACQUIRED ABSENCE OF OTHER ORGANS: Chronic | ICD-10-CM

## 2019-04-25 DIAGNOSIS — Z96.7 PRESENCE OF OTHER BONE AND TENDON IMPLANTS: Chronic | ICD-10-CM

## 2019-04-25 PROCEDURE — 88305 TISSUE EXAM BY PATHOLOGIST: CPT | Mod: 26

## 2019-04-25 PROCEDURE — 19303 MAST SIMPLE COMPLETE: CPT | Mod: 58,RT

## 2019-04-25 PROCEDURE — 88300 SURGICAL PATH GROSS: CPT | Mod: 26

## 2019-04-25 PROCEDURE — 19303 MAST SIMPLE COMPLETE: CPT | Mod: 50,AS

## 2019-04-25 RX ORDER — ONDANSETRON 8 MG/1
4 TABLET, FILM COATED ORAL ONCE
Qty: 0 | Refills: 0 | Status: COMPLETED | OUTPATIENT
Start: 2019-04-25 | End: 2019-04-25

## 2019-04-25 RX ORDER — ACETAMINOPHEN 500 MG
975 TABLET ORAL ONCE
Qty: 0 | Refills: 0 | Status: COMPLETED | OUTPATIENT
Start: 2019-04-25 | End: 2019-04-25

## 2019-04-25 RX ORDER — L.ACIDOPH/B.ANIMALIS/B.LONGUM 15B CELL
0 CAPSULE ORAL
Qty: 0 | Refills: 0 | COMMUNITY

## 2019-04-25 RX ORDER — PROCHLORPERAZINE MALEATE 5 MG
10 TABLET ORAL ONCE
Qty: 0 | Refills: 0 | Status: COMPLETED | OUTPATIENT
Start: 2019-04-25 | End: 2019-04-25

## 2019-04-25 RX ORDER — FENTANYL CITRATE 50 UG/ML
50 INJECTION INTRAVENOUS
Qty: 0 | Refills: 0 | Status: DISCONTINUED | OUTPATIENT
Start: 2019-04-25 | End: 2019-04-25

## 2019-04-25 RX ORDER — SODIUM CHLORIDE 9 MG/ML
1000 INJECTION, SOLUTION INTRAVENOUS
Qty: 0 | Refills: 0 | Status: DISCONTINUED | OUTPATIENT
Start: 2019-04-25 | End: 2019-04-25

## 2019-04-25 RX ORDER — OXYCODONE HYDROCHLORIDE 5 MG/1
5 TABLET ORAL ONCE
Qty: 0 | Refills: 0 | Status: DISCONTINUED | OUTPATIENT
Start: 2019-04-25 | End: 2019-04-25

## 2019-04-25 RX ORDER — OXYCODONE HYDROCHLORIDE 5 MG/1
10 TABLET ORAL ONCE
Qty: 0 | Refills: 0 | Status: DISCONTINUED | OUTPATIENT
Start: 2019-04-25 | End: 2019-04-25

## 2019-04-25 RX ORDER — OXYCODONE AND ACETAMINOPHEN 5; 325 MG/1; MG/1
1 TABLET ORAL EVERY 4 HOURS
Qty: 0 | Refills: 0 | Status: DISCONTINUED | OUTPATIENT
Start: 2019-04-25 | End: 2019-04-25

## 2019-04-25 RX ADMIN — FENTANYL CITRATE 50 MICROGRAM(S): 50 INJECTION INTRAVENOUS at 11:19

## 2019-04-25 RX ADMIN — ONDANSETRON 4 MILLIGRAM(S): 8 TABLET, FILM COATED ORAL at 11:05

## 2019-04-25 RX ADMIN — Medication 975 MILLIGRAM(S): at 06:52

## 2019-04-25 RX ADMIN — OXYCODONE AND ACETAMINOPHEN 1 TABLET(S): 5; 325 TABLET ORAL at 13:07

## 2019-04-25 RX ADMIN — Medication 10 MILLIGRAM(S): at 11:19

## 2019-04-25 RX ADMIN — FENTANYL CITRATE 50 MICROGRAM(S): 50 INJECTION INTRAVENOUS at 11:26

## 2019-04-25 RX ADMIN — OXYCODONE HYDROCHLORIDE 10 MILLIGRAM(S): 5 TABLET ORAL at 06:53

## 2019-04-25 RX ADMIN — OXYCODONE HYDROCHLORIDE 10 MILLIGRAM(S): 5 TABLET ORAL at 06:54

## 2019-04-25 RX ADMIN — FENTANYL CITRATE 50 MICROGRAM(S): 50 INJECTION INTRAVENOUS at 11:07

## 2019-04-25 RX ADMIN — Medication 975 MILLIGRAM(S): at 06:53

## 2019-04-25 NOTE — ASU DISCHARGE PLAN (ADULT/PEDIATRIC) - CARE PROVIDER_API CALL
Wyatt Shi)  Plastic Surgery  [address], [address]  [place] [address]  Phone: [phone]  Fax: [phone]  Follow Up Time:

## 2019-04-25 NOTE — ASU DISCHARGE PLAN (ADULT/PEDIATRIC) - CALL YOUR DOCTOR IF YOU HAVE ANY OF THE FOLLOWING:
Swelling that gets worse/Bleeding that does not stop/Wound/Surgical Site with redness, or foul smelling discharge or pus

## 2019-04-25 NOTE — BRIEF OPERATIVE NOTE - NSICDXBRIEFPROCEDURE_GEN_ALL_CORE_FT
PROCEDURES:  Revision, reconstruction, breast, bilateral, using Venu flap 25-Apr-2019 11:25:00  Adiel Christensen

## 2019-04-29 LAB — SURGICAL PATHOLOGY STUDY: SIGNIFICANT CHANGE UP

## 2019-04-30 DIAGNOSIS — Z80.59 FAMILY HISTORY OF MALIGNANT NEOPLASM OF OTHER URINARY TRACT ORGAN: ICD-10-CM

## 2019-04-30 DIAGNOSIS — K21.9 GASTRO-ESOPHAGEAL REFLUX DISEASE WITHOUT ESOPHAGITIS: ICD-10-CM

## 2019-04-30 DIAGNOSIS — R39.15 URGENCY OF URINATION: ICD-10-CM

## 2019-04-30 DIAGNOSIS — N64.1 FAT NECROSIS OF BREAST: ICD-10-CM

## 2019-04-30 DIAGNOSIS — Z87.891 PERSONAL HISTORY OF NICOTINE DEPENDENCE: ICD-10-CM

## 2019-04-30 DIAGNOSIS — M54.5 LOW BACK PAIN: ICD-10-CM

## 2019-04-30 DIAGNOSIS — Z87.440 PERSONAL HISTORY OF URINARY (TRACT) INFECTIONS: ICD-10-CM

## 2019-04-30 DIAGNOSIS — E89.0 POSTPROCEDURAL HYPOTHYROIDISM: ICD-10-CM

## 2019-04-30 DIAGNOSIS — F41.9 ANXIETY DISORDER, UNSPECIFIED: ICD-10-CM

## 2019-04-30 DIAGNOSIS — Z80.51 FAMILY HISTORY OF MALIGNANT NEOPLASM OF KIDNEY: ICD-10-CM

## 2019-04-30 DIAGNOSIS — Z85.3 PERSONAL HISTORY OF MALIGNANT NEOPLASM OF BREAST: ICD-10-CM

## 2019-04-30 DIAGNOSIS — M81.0 AGE-RELATED OSTEOPOROSIS WITHOUT CURRENT PATHOLOGICAL FRACTURE: ICD-10-CM

## 2019-04-30 DIAGNOSIS — Z90.13 ACQUIRED ABSENCE OF BILATERAL BREASTS AND NIPPLES: ICD-10-CM

## 2019-04-30 DIAGNOSIS — R32 UNSPECIFIED URINARY INCONTINENCE: ICD-10-CM

## 2019-04-30 DIAGNOSIS — Z80.1 FAMILY HISTORY OF MALIGNANT NEOPLASM OF TRACHEA, BRONCHUS AND LUNG: ICD-10-CM

## 2019-04-30 DIAGNOSIS — R35.0 FREQUENCY OF MICTURITION: ICD-10-CM

## 2019-04-30 DIAGNOSIS — M48.00 SPINAL STENOSIS, SITE UNSPECIFIED: ICD-10-CM

## 2019-04-30 DIAGNOSIS — N60.31 FIBROSCLEROSIS OF RIGHT BREAST: ICD-10-CM

## 2019-04-30 DIAGNOSIS — C50.911 MALIGNANT NEOPLASM OF UNSPECIFIED SITE OF RIGHT FEMALE BREAST: ICD-10-CM

## 2019-04-30 DIAGNOSIS — M06.9 RHEUMATOID ARTHRITIS, UNSPECIFIED: ICD-10-CM

## 2019-04-30 DIAGNOSIS — Z91.040 LATEX ALLERGY STATUS: ICD-10-CM

## 2019-04-30 DIAGNOSIS — K58.9 IRRITABLE BOWEL SYNDROME WITHOUT DIARRHEA: ICD-10-CM

## 2019-05-02 ENCOUNTER — CHART COPY (OUTPATIENT)
Age: 69
End: 2019-05-02

## 2019-05-02 PROBLEM — Z09 POSTOP CHECK: Status: ACTIVE | Noted: 2018-09-19

## 2019-05-03 ENCOUNTER — APPOINTMENT (OUTPATIENT)
Dept: BREAST CENTER | Facility: CLINIC | Age: 69
End: 2019-05-03
Payer: MEDICARE

## 2019-05-03 VITALS
BODY MASS INDEX: 22.2 KG/M2 | HEART RATE: 72 BPM | DIASTOLIC BLOOD PRESSURE: 86 MMHG | HEIGHT: 64 IN | WEIGHT: 130 LBS | SYSTOLIC BLOOD PRESSURE: 150 MMHG

## 2019-05-03 DIAGNOSIS — Z09 ENCOUNTER FOR FOLLOW-UP EXAMINATION AFTER COMPLETED TREATMENT FOR CONDITIONS OTHER THAN MALIGNANT NEOPLASM: ICD-10-CM

## 2019-05-03 PROCEDURE — 99024 POSTOP FOLLOW-UP VISIT: CPT

## 2019-05-03 RX ORDER — OXYCODONE HYDROCHLORIDE AND ACETAMINOPHEN 10; 325 MG/1; MG/1
TABLET ORAL
Refills: 0 | Status: ACTIVE | COMMUNITY

## 2019-05-03 NOTE — PHYSICAL EXAM
[Asymmetrical] : asymmetrical [de-identified] : Implant. Inframammary incision clean.  No erythema. [de-identified] : Implant. Inframammary incision clean- no erythema.   Older vertical scar 6:00.

## 2019-05-03 NOTE — DATA REVIEWED
[FreeTextEntry1] : Pathology Left breast biopsy 1:00 N3= in situ mammary duct adenocarcinoma with apocrine features 3 mm in greatest dimension (IHC panel show preservation of myoepithelial layer and thus argue against invasive carcinoma),  ER negative, AL negative, Her 2 3+, Ki 67 12%\par \par Bilateral breast MRI 7/12/2018:  RIght 1.2 cm focal NME UI posterior, rec ultrasound and biopsy.  Left 1.2 cm clumped irregular enhancement  extends inferior/posterior/medial where 1.4 cm area noted, rec biopsy.\par \par Right breast ultrasound 7/18/2018: negative\par \par Pathology 7/26/2018:  Bilateral MRI guided core biopsies- Right= DCIS ER 1-4% AL 0\par                                                                                            Left= DCIS\par                                                                                             \par \par Post MRI biopsy films show the left clips to be 33 mm apart on the MLO and 20 mm apart on the cc views\par \par \par Surgical pathology 9/11/2018:  Right breast- 1.6 cm DCIS cribriform and solid, intermediate grade, < 1 mm from deep and anterior/superior margin, SLN x2 neg, intranipple tissue benign\par                                                  Left breast- DCIS 1.0 cm UOQ and 9 mm LIQ, 1 mm from anterior/superior specimen margin UOQ, SLN neg, intranipple tissue negative, inferior margin negative\par \par Surgical pathology 4/25/2019:  Right and left margins- negative for malignancy\par

## 2019-05-03 NOTE — HISTORY OF PRESENT ILLNESS
[FreeTextEntry1] : This is a 68 year old  female who has a history of stage I right breast cancer (1 cm mod diff, ER+KS-) in 1998 (age 48) treated with a lumpectomy, sentinel node biopsy, CMF x6 months and radiation.  She also took Evista for 5 years.\par \par She was found to have an abnormality on her left mammogram in June and had an ultrasound guided core biopsy that showed in situ cancer.  She then had a breast MRI.  There were additional findings in each breast for which MRI guided core biopsies were performed.  Both the right and left MRI guided biopsies showed DCIS.\par \par She underwent a bilateral nipple sparing mastectomy with sentinel node biopsies and direct to prepectoral implant reconstruction on 9/11/2018. She had bilateral DCIS with close margins (Left anterior superior 1mm and right anterior superior <1 mm). We have discussed re-excisions and presented her case at Breast conference with mixed opinions.\par \par She underwent re-excisions of the mastectomy margins with implant exchange on 4/25/2019.  The was no residual DCIS.  She is taking Percocet at night and Tylenol during the day.  She had a slight fever to 100 yesterday, but feels better today.  She has had a chronic UTI and has slight symptoms, so stayed on the macrobid.

## 2019-08-27 PROBLEM — K44.9 DIAPHRAGMATIC HERNIA WITHOUT OBSTRUCTION OR GANGRENE: Chronic | Status: ACTIVE | Noted: 2019-04-15

## 2019-08-27 PROBLEM — N39.0 URINARY TRACT INFECTION, SITE NOT SPECIFIED: Chronic | Status: ACTIVE | Noted: 2019-04-15

## 2019-08-27 PROBLEM — R32 UNSPECIFIED URINARY INCONTINENCE: Chronic | Status: ACTIVE | Noted: 2019-04-15

## 2019-08-29 ENCOUNTER — APPOINTMENT (OUTPATIENT)
Dept: UROGYNECOLOGY | Facility: CLINIC | Age: 69
End: 2019-08-29
Payer: MEDICARE

## 2019-08-29 VITALS
WEIGHT: 133.56 LBS | HEIGHT: 64 IN | BODY MASS INDEX: 22.8 KG/M2 | DIASTOLIC BLOOD PRESSURE: 77 MMHG | SYSTOLIC BLOOD PRESSURE: 115 MMHG

## 2019-08-29 DIAGNOSIS — N93.9 ABNORMAL UTERINE AND VAGINAL BLEEDING, UNSPECIFIED: ICD-10-CM

## 2019-08-29 LAB
BILIRUB UR QL STRIP: NEGATIVE
CLARITY UR: NORMAL
COLLECTION METHOD: NORMAL
GLUCOSE UR-MCNC: NEGATIVE
HCG UR QL: 0.2 EU/DL
HGB UR QL STRIP.AUTO: NORMAL
KETONES UR-MCNC: NEGATIVE
LEUKOCYTE ESTERASE UR QL STRIP: NORMAL
NITRITE UR QL STRIP: NEGATIVE
PH UR STRIP: 5.5
PROT UR STRIP-MCNC: NORMAL
SP GR UR STRIP: 1.03

## 2019-08-29 PROCEDURE — 57160 INSERT PESSARY/OTHER DEVICE: CPT

## 2019-08-29 PROCEDURE — 99214 OFFICE O/P EST MOD 30 MIN: CPT | Mod: 25

## 2019-08-29 PROCEDURE — 81003 URINALYSIS AUTO W/O SCOPE: CPT | Mod: QW

## 2019-08-29 PROCEDURE — A4562: CPT

## 2019-08-29 PROCEDURE — 51798 US URINE CAPACITY MEASURE: CPT

## 2019-08-30 PROBLEM — N93.9 VAGINAL BLEEDING: Status: ACTIVE | Noted: 2019-08-30

## 2019-08-30 NOTE — DISCUSSION/SUMMARY
[FreeTextEntry1] : #3 ring Pessary with support replaced #3 Ring with support and knob for easier removal and replacement, finds the knob cumbersome. Vagina intact, atrophic, no lesions, erosions, bleeding, discharge or odor noted.  Will finish current supply of Bactrim.  Will do a TVS and abdominal pelvic sono at [facility] in [place].  Will come here for catheterized specimen if she suspects a UTI for accuracy, burning might be related to atrophy.  Reviewed self care for pessary user.  She states she is very busy caring for her  20 years her senior and his daughter who recently had a stroke.  I was able to answer all of her questions.  RTC pending results or PRN.

## 2019-08-30 NOTE — PHYSICAL EXAM
[No Acute Distress] : in no acute distress [Cough] : no cough [Oriented x3] : oriented to person, place, and time [No Edema] : ~T edema was not present [Normal Appearance] : ~T the appearance of the neck was normal [Tenderness] : ~T no ~M abdominal tenderness observed [Warm and Dry] : was warm and dry to touch [Normal Gait] : gait was normal [Vulvar Atrophy] : vulvar atrophy [Labia Majora] : were normal [Labia Minora] : were normal [Atrophy] : atrophy [Dry Mucosa] : dry mucosa [Cystocele] : a cystocele [1] : 1 [Normal] : normal [] : I [Exam Deferred] : was deferred [Uterine Adnexae] : were not tender and not enlarged [de-identified] : Visible pale atrophic changes [de-identified] : atrophic

## 2019-08-30 NOTE — HISTORY OF PRESENT ILLNESS
[FreeTextEntry1] : This 70 yo woman presents with a #3 Ring pessary with support and knob, currently out due to recent vaginal bleeding.  The bleeding has stopped now, she self manages the pessary and hasn't seen a GYN in over 5 years and was last seen here 4/2017.  She has a history of Right Breast CA 1990 Lumpectomy, chemo and radiation with second episode of bilateral in situ, breast CA, different cancer, bilateral mastectomy 5/2018 with no adjuvant therapy. She is BRCA negative.   She has tried anticholinergics and myrbetriq for frequency, urgency and some incontinence and her chronic constipation was worse from all the medications.  She reports a UTI 3X a year. and has burning with urination.  She believed she has a UTI recently and took a couple of doses of Macrobid and then Bactrim and feels better now, still using the Bactrim.

## 2019-08-30 NOTE — CHIEF COMPLAINT
[Falling Pelvic Organs] : falling pelvic organs [Other ___] : [unfilled] [Poor Bladder Control] : poor bladder control

## 2019-09-05 ENCOUNTER — APPOINTMENT (OUTPATIENT)
Dept: ULTRASOUND IMAGING | Facility: CLINIC | Age: 69
End: 2019-09-05
Payer: MEDICARE

## 2019-09-05 ENCOUNTER — OUTPATIENT (OUTPATIENT)
Dept: OUTPATIENT SERVICES | Facility: HOSPITAL | Age: 69
LOS: 1 days | End: 2019-09-05

## 2019-09-05 DIAGNOSIS — Z90.13 ACQUIRED ABSENCE OF BILATERAL BREASTS AND NIPPLES: Chronic | ICD-10-CM

## 2019-09-05 DIAGNOSIS — Z90.89 ACQUIRED ABSENCE OF OTHER ORGANS: Chronic | ICD-10-CM

## 2019-09-05 DIAGNOSIS — Z98.890 OTHER SPECIFIED POSTPROCEDURAL STATES: Chronic | ICD-10-CM

## 2019-09-05 DIAGNOSIS — N93.9 ABNORMAL UTERINE AND VAGINAL BLEEDING, UNSPECIFIED: ICD-10-CM

## 2019-09-05 DIAGNOSIS — Z96.7 PRESENCE OF OTHER BONE AND TENDON IMPLANTS: Chronic | ICD-10-CM

## 2019-09-05 PROCEDURE — 76830 TRANSVAGINAL US NON-OB: CPT | Mod: 26

## 2019-09-05 PROCEDURE — 76856 US EXAM PELVIC COMPLETE: CPT | Mod: 26

## 2019-09-06 ENCOUNTER — TRANSCRIPTION ENCOUNTER (OUTPATIENT)
Age: 69
End: 2019-09-06

## 2019-09-10 NOTE — ASU PATIENT PROFILE, ADULT - ANESTHESIA, PREVIOUS REACTION, PROFILE
Called pt and discussed BMP results.  GFR lower again.  Discussed new ozempic medication with MTM, who discussed rare case studies of it causing renal issues.  Discussed options- she'll cont on the ozempic and work on hydration.  She'll return for a lab-only BMP next week to recheck.    She also notes that she saw Dr. Krause yesterday and it was very helpful.  CW nausea/vomiting

## 2019-10-02 ENCOUNTER — APPOINTMENT (OUTPATIENT)
Dept: UROGYNECOLOGY | Facility: CLINIC | Age: 69
End: 2019-10-02

## 2019-10-14 ENCOUNTER — APPOINTMENT (OUTPATIENT)
Dept: UROGYNECOLOGY | Facility: CLINIC | Age: 69
End: 2019-10-14
Payer: MEDICARE

## 2019-10-14 VITALS
BODY MASS INDEX: 22.71 KG/M2 | HEIGHT: 64 IN | DIASTOLIC BLOOD PRESSURE: 81 MMHG | SYSTOLIC BLOOD PRESSURE: 139 MMHG | WEIGHT: 133 LBS

## 2019-10-14 LAB
BILIRUB UR QL STRIP: NEGATIVE
CLARITY UR: CLEAR
COLLECTION METHOD: NORMAL
GLUCOSE UR-MCNC: NEGATIVE
HCG UR QL: 0.2 EU/DL
HGB UR QL STRIP.AUTO: NORMAL
KETONES UR-MCNC: NEGATIVE
LEUKOCYTE ESTERASE UR QL STRIP: NORMAL
NITRITE UR QL STRIP: NEGATIVE
PH UR STRIP: 6
PROT UR STRIP-MCNC: NEGATIVE
SP GR UR STRIP: 1.02

## 2019-10-14 PROCEDURE — 51798 US URINE CAPACITY MEASURE: CPT

## 2019-10-14 PROCEDURE — 99213 OFFICE O/P EST LOW 20 MIN: CPT | Mod: 25

## 2019-10-14 PROCEDURE — 81003 URINALYSIS AUTO W/O SCOPE: CPT | Mod: QW

## 2019-10-14 NOTE — PHYSICAL EXAM
[Normal Appearance] : general appearance was normal [Aa ____] : Aa [unfilled] [Ba ____] : Ba [unfilled] [C ____] : C [unfilled] [GH ____] : GH [unfilled] [PB ____] : PB [unfilled] [TVL ____] : TVL  [unfilled] [Ap ____] : Ap [unfilled] [Bp ____] : Bp [unfilled] [D ____] : D [unfilled] [Normal] : normal [FreeTextEntry3] : negative empty stress test

## 2019-10-14 NOTE — HISTORY OF PRESENT ILLNESS
[FreeTextEntry1] : Has been using a pessary for many years and has been managing it well. Her major issues are with nocturia. Getting up at least 3-4 times at night. Told she snores and has never been checked for sleep apnea. I gave her gabapentin by me in the past and felt it helped a great deal. She only takes it once a week. Wants more. Has urgency and and frequency and had irritable bowel. She was given a Rx for it in the past few weeks and told by pharmacy that it was 75$ for 1 months and does not want to take it unless she knows it is going to work. Has had 3 surgeries in the last couple of years on breast and wrist.

## 2019-10-14 NOTE — DISCUSSION/SUMMARY
[FreeTextEntry1] : Dealing with recent change in urinary habits. Will send urine today. Rx for gabapentin sent. Recommend cysto. We discussed her seeing primary about sleep study. Samples of myrbetriq 25mg given. We discussed surgical correction of her prolapse and I think she would be a good candidate for robotic surgery. We also discussed 3rd line treatment for OAB.

## 2019-10-17 ENCOUNTER — RESULT REVIEW (OUTPATIENT)
Age: 69
End: 2019-10-17

## 2019-12-04 ENCOUNTER — APPOINTMENT (OUTPATIENT)
Dept: BREAST CENTER | Facility: CLINIC | Age: 69
End: 2019-12-04
Payer: MEDICARE

## 2019-12-04 DIAGNOSIS — N64.89 OTHER SPECIFIED DISORDERS OF BREAST: ICD-10-CM

## 2019-12-04 PROCEDURE — 99213 OFFICE O/P EST LOW 20 MIN: CPT

## 2019-12-04 NOTE — PAST MEDICAL HISTORY
[Menarche Age ____] : age at menarche was [unfilled] [Total Preg ___] : G[unfilled] [Menopause Age____] : age at menopause was [unfilled] [FreeTextEntry6] : no [FreeTextEntry7] : no

## 2019-12-04 NOTE — HISTORY OF PRESENT ILLNESS
[FreeTextEntry1] : This is a 69 year old  female who has a history of stage I right breast cancer (1 cm mod diff, ER+IN-) in 1998 (age 48) treated with a lumpectomy, sentinel node biopsy, CMF x6 months and radiation.  She also took Evista for 5 years.\par \par She was found to have an abnormality on her left mammogram in June and had an ultrasound guided core biopsy that showed in situ cancer.  She then had a breast MRI.  There were additional findings in each breast for which MRI guided core biopsies were performed.  Both the right and left MRI guided biopsies showed DCIS.\par \par She underwent a bilateral nipple sparing mastectomy with sentinel node biopsies and direct to prepectoral implant reconstruction on 9/11/2018. She had bilateral DCIS with close margins (Left anterior superior 1mm and right anterior superior <1 mm). We have discussed re-excisions and presented her case at Breast conference with mixed opinions.\par \par She underwent re-excisions of the mastectomy margins with implant exchange on 4/25/2019.  The was no residual DCIS.  \par \par She has no current breast complaints. She has some asymmetry, but she feels it is better than what she had before the surgery.

## 2019-12-04 NOTE — PHYSICAL EXAM
[Asymmetrical] : asymmetrical [EOMI] : extra ocular movement intact [Sclera nonicteric] : sclera nonicteric [Supple] : supple [No Supraclavicular Adenopathy] : no supraclavicular adenopathy [Clear to Auscultation Bilat] : clear to auscultation bilaterally [No Cervical Adenopathy] : no cervical adenopathy [No Thyromegaly] : no thyromegaly [No dominant masses] : no dominant masses in right breast  [No dominant masses] : no dominant masses left breast [No Nipple Discharge] : no left nipple discharge [No Nipple Retraction] : no left nipple retraction [Soft] : abdomen soft [No Axillary Lymphadenopathy] : no right axillary lymphadenopathy [de-identified] : Implant. Inframammary scar.   Older vertical scar 6:00. [de-identified] : Right smaller than left. [Not Tender] : non-tender [de-identified] : Implant. Inframammary scar.

## 2020-02-02 ENCOUNTER — LABORATORY RESULT (OUTPATIENT)
Age: 70
End: 2020-02-02

## 2020-02-03 ENCOUNTER — APPOINTMENT (OUTPATIENT)
Dept: UROGYNECOLOGY | Facility: CLINIC | Age: 70
End: 2020-02-03
Payer: MEDICARE

## 2020-02-03 DIAGNOSIS — Z87.440 PERSONAL HISTORY OF URINARY (TRACT) INFECTIONS: ICD-10-CM

## 2020-02-03 DIAGNOSIS — D41.4 NEOPLASM OF UNCERTAIN BEHAVIOR OF BLADDER: ICD-10-CM

## 2020-02-03 PROCEDURE — 99213 OFFICE O/P EST LOW 20 MIN: CPT | Mod: 25

## 2020-02-03 PROCEDURE — 52204Z: CUSTOM

## 2020-02-03 NOTE — CHIEF COMPLAINT
[Urine Frequency] : urine frequency [Poor Bladder Control] : poor bladder control [Recurrent Urinary Infections] : recurrent urinary infections

## 2020-02-03 NOTE — DISCUSSION/SUMMARY
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 We discussed UTI prevention options. She has history of breast cancer and is concerned about using estrogen. I sent an RX for macrobid for 3 days and then methenamine to follow to suppress UTIs. I sent her urine today. She is still considering prolapse repair surgery and we discussed robotic  surgery with or without mesh. I aksed her to followup in 4-6 weeks to check her urine and to call us if she develops UTI symptoms in the interim. Bladder biopsy sent to path.

## 2020-02-03 NOTE — HISTORY OF PRESENT ILLNESS
[FreeTextEntry1] : Here for cysto. She has noticed an increased odor in her urine but can't tell if it was the pessary. She did take the pessary out and had some bleeding and so she left it out last week for a few days.

## 2020-02-05 LAB
APPEARANCE: ABNORMAL
BACTERIA: NEGATIVE
BILIRUBIN URINE: NEGATIVE
BLOOD URINE: ABNORMAL
COLOR: YELLOW
GLUCOSE QUALITATIVE U: NEGATIVE
HYALINE CASTS: 2 /LPF
KETONES URINE: NEGATIVE
LEUKOCYTE ESTERASE URINE: ABNORMAL
MICROSCOPIC-UA: NORMAL
NITRITE URINE: NEGATIVE
PH URINE: 6
PROTEIN URINE: NORMAL
RED BLOOD CELLS URINE: 4 /HPF
SPECIFIC GRAVITY URINE: 1.02
SQUAMOUS EPITHELIAL CELLS: 7 /HPF
UROBILINOGEN URINE: NORMAL
WHITE BLOOD CELLS URINE: 77 /HPF

## 2020-02-06 LAB — BACTERIA UR CULT: NORMAL

## 2020-03-23 ENCOUNTER — APPOINTMENT (OUTPATIENT)
Dept: UROGYNECOLOGY | Facility: CLINIC | Age: 70
End: 2020-03-23

## 2020-12-08 ENCOUNTER — NON-APPOINTMENT (OUTPATIENT)
Age: 70
End: 2020-12-08

## 2020-12-11 ENCOUNTER — NON-APPOINTMENT (OUTPATIENT)
Age: 70
End: 2020-12-11

## 2020-12-14 ENCOUNTER — RESULT CHARGE (OUTPATIENT)
Age: 70
End: 2020-12-14

## 2020-12-14 ENCOUNTER — APPOINTMENT (OUTPATIENT)
Dept: UROGYNECOLOGY | Facility: CLINIC | Age: 70
End: 2020-12-14
Payer: MEDICARE

## 2020-12-14 VITALS — DIASTOLIC BLOOD PRESSURE: 77 MMHG | SYSTOLIC BLOOD PRESSURE: 134 MMHG

## 2020-12-14 DIAGNOSIS — N95.2 POSTMENOPAUSAL ATROPHIC VAGINITIS: ICD-10-CM

## 2020-12-14 LAB
BILIRUB UR QL STRIP: NEGATIVE
CLARITY UR: NORMAL
COLLECTION METHOD: NORMAL
GLUCOSE UR-MCNC: NEGATIVE
HCG UR QL: 0.2 EU/DL
HGB UR QL STRIP.AUTO: NORMAL
KETONES UR-MCNC: NORMAL
LEUKOCYTE ESTERASE UR QL STRIP: NORMAL
NITRITE UR QL STRIP: NEGATIVE
PH UR STRIP: 5
PROT UR STRIP-MCNC: 30
SP GR UR STRIP: 1.02

## 2020-12-14 PROCEDURE — 99213 OFFICE O/P EST LOW 20 MIN: CPT

## 2020-12-23 PROBLEM — Z87.440 HISTORY OF URINARY TRACT INFECTION: Status: RESOLVED | Noted: 2019-03-26 | Resolved: 2020-12-23

## 2021-01-12 ENCOUNTER — APPOINTMENT (OUTPATIENT)
Dept: BREAST CENTER | Facility: CLINIC | Age: 71
End: 2021-01-12

## 2021-06-26 ENCOUNTER — EMERGENCY (EMERGENCY)
Facility: HOSPITAL | Age: 71
LOS: 1 days | Discharge: HOME | End: 2021-06-26
Attending: EMERGENCY MEDICINE
Payer: MEDICARE

## 2021-06-26 VITALS
DIASTOLIC BLOOD PRESSURE: 90 MMHG | HEIGHT: 64 IN | TEMPERATURE: 98 F | SYSTOLIC BLOOD PRESSURE: 154 MMHG | OXYGEN SATURATION: 99 % | RESPIRATION RATE: 20 BRPM | HEART RATE: 89 BPM | WEIGHT: 134.92 LBS

## 2021-06-26 DIAGNOSIS — Z90.13 ACQUIRED ABSENCE OF BILATERAL BREASTS AND NIPPLES: Chronic | ICD-10-CM

## 2021-06-26 DIAGNOSIS — Z98.890 OTHER SPECIFIED POSTPROCEDURAL STATES: Chronic | ICD-10-CM

## 2021-06-26 DIAGNOSIS — Z90.89 ACQUIRED ABSENCE OF OTHER ORGANS: Chronic | ICD-10-CM

## 2021-06-26 DIAGNOSIS — Z96.7 PRESENCE OF OTHER BONE AND TENDON IMPLANTS: Chronic | ICD-10-CM

## 2021-06-26 PROCEDURE — 99284 EMERGENCY DEPT VISIT MOD MDM: CPT

## 2021-06-26 PROCEDURE — 72220 X-RAY EXAM SACRUM TAILBONE: CPT | Mod: 26

## 2021-06-26 PROCEDURE — 72100 X-RAY EXAM L-S SPINE 2/3 VWS: CPT

## 2021-06-26 PROCEDURE — 72125 CT NECK SPINE W/O DYE: CPT | Mod: 26,MA

## 2021-06-26 PROCEDURE — 72100 X-RAY EXAM L-S SPINE 2/3 VWS: CPT | Mod: 26

## 2021-06-26 PROCEDURE — 72125 CT NECK SPINE W/O DYE: CPT

## 2021-06-26 PROCEDURE — 70450 CT HEAD/BRAIN W/O DYE: CPT | Mod: 26,MA

## 2021-06-26 PROCEDURE — 72220 X-RAY EXAM SACRUM TAILBONE: CPT

## 2021-06-26 PROCEDURE — 73110 X-RAY EXAM OF WRIST: CPT

## 2021-06-26 PROCEDURE — 73090 X-RAY EXAM OF FOREARM: CPT | Mod: 26,LT

## 2021-06-26 PROCEDURE — 99284 EMERGENCY DEPT VISIT MOD MDM: CPT | Mod: 25

## 2021-06-26 PROCEDURE — 73110 X-RAY EXAM OF WRIST: CPT | Mod: 26,RT

## 2021-06-26 PROCEDURE — 73090 X-RAY EXAM OF FOREARM: CPT

## 2021-06-26 PROCEDURE — 70450 CT HEAD/BRAIN W/O DYE: CPT

## 2021-06-26 RX ORDER — METHOCARBAMOL 500 MG/1
1500 TABLET, FILM COATED ORAL ONCE
Refills: 0 | Status: COMPLETED | OUTPATIENT
Start: 2021-06-26 | End: 2021-06-26

## 2021-06-26 RX ORDER — IBUPROFEN 200 MG
600 TABLET ORAL ONCE
Refills: 0 | Status: COMPLETED | OUTPATIENT
Start: 2021-06-26 | End: 2021-06-26

## 2021-06-26 RX ADMIN — METHOCARBAMOL 1500 MILLIGRAM(S): 500 TABLET, FILM COATED ORAL at 14:10

## 2021-06-26 RX ADMIN — Medication 600 MILLIGRAM(S): at 14:10

## 2021-06-26 NOTE — ED PROVIDER NOTE - PATIENT PORTAL LINK FT
You can access the FollowMyHealth Patient Portal offered by [facility] by registering at the following website: [email or web address]. By joining [facility]’s FollowMyHealth portal, you will also be able to view your health information using other applications (apps) compatible with our system.

## 2021-06-26 NOTE — ED PROVIDER NOTE - PHYSICAL EXAMINATION
HEENT: + occipital hematoma , no racoon eyes, no mccarthy sings, no hemotynpaum, PERRL, EOMI, no nystagmus, no dental injuries  Neck: supple, no midline tenderness to palpation, + FROM, NEXUS negative, no abrasions, no echymosis  Chest: non tender, equal expansion bilaterally, no echymosis, no abrasions, seatbelt sign negative.  Lungs: CTA, good air entry bilaterally, no wheezing, no rales, no rhonchi  Abdomen: soft, non tender, no guarding, no rebound, no distention, no echymosis  Back: no midline tenderness to palpation   Extremities: atraumatic, + FROM  Skin: no rash  Neuro: A & O x 3, clear speech, steady gait, cerrebellar intact, no focal deficits.

## 2021-06-26 NOTE — ED ADULT TRIAGE NOTE - CHIEF COMPLAINT QUOTE
slip and fall on water hit back of head denies loc denies blood thinners, c/o right wrist pain and tailbone discomfort

## 2021-06-26 NOTE — ED PROVIDER NOTE - NSFOLLOWUPINSTRUCTIONS_ED_ALL_ED_FT
Closed Head Injury    A closed head injury is an injury to your head that may or may not involve a traumatic brain injury (TBI).  A CT scan of the head may not have been performed because they are usually normal after a concussion. Concussions are diagnosed and managed based on the history given and the symptoms experienced after the head injury. Most concussions do not cause serious problem and get better over several days.  Symptoms of TBI can be short or long lasting and include headache, dizziness, interference with memory or speech, fatigue, confusion, changes in sleep, mood changes, nausea, depression/anxiety, and dulling of senses. Make sure to obtain proper rest which includes getting plenty of sleep, avoiding excessive visual stimulation, and avoiding activities that may cause physical or mental stress. Avoid any situation where there is potential for another head injury, including sports.    SEEK IMMEDIATE MEDICAL CARE IF YOU HAVE ANY OF THE FOLLOWING SYMPTOMS: unusual drowsiness, vomiting, severe dizziness, seizures, lightheadedness, muscular weakness, different pupil sizes, visual changes, or clear or bloody discharge from your ears or nose.     Contusion    A contusion is a deep bruise. Contusions are the result of a blunt injury to tissues and muscle fibers under the skin. The skin overlying the contusion may turn blue, purple, or yellow. Symptoms also include pain and swelling in the injured area.    SEEK IMMEDIATE MEDICAL CARE IF YOU HAVE ANY OF THE FOLLOWING SYMPTOMS: severe pain, numbness, tingling, pain, weakness, or skin color/temperature change in any part of your body distal to the injury.     Please follow up with your doctor within 48 hours  Use surgical donut to sit as needed for pain

## 2021-06-26 NOTE — ED PROVIDER NOTE - ATTENDING CONTRIBUTION TO CARE
Dr. Mckinney : I have personally seen and examined this patient at the bedside. I have fully participated in the care of this patient. I have reviewed all pertinent clinical information, including history, physical exam, plan and the PAs note and agree except as noted.     70 y/o female presents pw ha sp fall. notes that slupped and fell on the wet floor and hit the back of her head no loc. + nausea, feeling woozy since. + lower back pain . no loc no ac.   Denies f/c/n/v/cp/sob/palpitations/cough/abd.pain/d/c/dysuria/hematuria. no sick contacts/recent travel.    PE:    Head: atraumatic, normacephalic  Face: atraumatic, no crepitus no orbital/maxillary/mandibular ttp  throat: uvula midline no exudates  eyes: perrla eomi  heart: rrr s1s2  lungs: ctab  abd: soft, nt nd +bs no rebound/guarding no cva ttp  skin: warm  LE: no swelling, no calf ttp  back: no midline cervical/thoracic/lumbar ttp  ue: from to bl wrists no snuff box ttp neurovascularly intact    -->ambulatory will fu ct head, lower back xray pain control reassess

## 2021-06-26 NOTE — ED PROVIDER NOTE - CLINICAL SUMMARY MEDICAL DECISION MAKING FREE TEXT BOX
s/p fall from standing  CT head/neck no traumatic injury  x-rays negative for fx  will d/c with analgesics, use donut prn for pain with sitting, follow up pmd

## 2021-06-26 NOTE — ED PROVIDER NOTE - OBJECTIVE STATEMENT
72 y/o female presents c/o headache, nausea, right wrist pain, and back pain s/p slip and fall on a wet kitchen floor. No LOC. no blood thinners.

## 2021-06-27 RX ORDER — METHOCARBAMOL 500 MG/1
1 TABLET, FILM COATED ORAL
Qty: 14 | Refills: 0
Start: 2021-06-27 | End: 2021-07-03

## 2021-06-27 RX ORDER — IBUPROFEN 200 MG
1 TABLET ORAL
Qty: 9 | Refills: 0
Start: 2021-06-27 | End: 2021-06-29

## 2021-06-28 NOTE — ED POST DISCHARGE NOTE - RESULT SUMMARY
age-indeterminate L2 superior endplate fx, patient having pain in that area s/p fall Sunday, advised to return to the ED for CT scan

## 2021-06-29 ENCOUNTER — EMERGENCY (EMERGENCY)
Facility: HOSPITAL | Age: 71
LOS: 1 days | Discharge: HOME | End: 2021-06-29
Attending: EMERGENCY MEDICINE
Payer: MEDICARE

## 2021-06-29 VITALS
RESPIRATION RATE: 18 BRPM | OXYGEN SATURATION: 97 % | SYSTOLIC BLOOD PRESSURE: 142 MMHG | HEART RATE: 73 BPM | HEIGHT: 64 IN | DIASTOLIC BLOOD PRESSURE: 95 MMHG | WEIGHT: 134.92 LBS | TEMPERATURE: 99 F

## 2021-06-29 DIAGNOSIS — Z96.7 PRESENCE OF OTHER BONE AND TENDON IMPLANTS: Chronic | ICD-10-CM

## 2021-06-29 DIAGNOSIS — Z98.890 OTHER SPECIFIED POSTPROCEDURAL STATES: Chronic | ICD-10-CM

## 2021-06-29 DIAGNOSIS — Z90.89 ACQUIRED ABSENCE OF OTHER ORGANS: Chronic | ICD-10-CM

## 2021-06-29 DIAGNOSIS — Z90.13 ACQUIRED ABSENCE OF BILATERAL BREASTS AND NIPPLES: Chronic | ICD-10-CM

## 2021-06-29 PROCEDURE — 99218: CPT

## 2021-06-29 PROCEDURE — 72131 CT LUMBAR SPINE W/O DYE: CPT | Mod: 26,MA

## 2021-06-29 PROCEDURE — 99281 EMR DPT VST MAYX REQ PHY/QHP: CPT

## 2021-06-29 PROCEDURE — 99283 EMERGENCY DEPT VISIT LOW MDM: CPT

## 2021-06-29 PROCEDURE — 72128 CT CHEST SPINE W/O DYE: CPT | Mod: 26,MA

## 2021-06-29 RX ORDER — GABAPENTIN 400 MG/1
300 CAPSULE ORAL EVERY 6 HOURS
Refills: 0 | Status: DISCONTINUED | OUTPATIENT
Start: 2021-06-29 | End: 2021-07-04

## 2021-06-29 RX ORDER — KETOROLAC TROMETHAMINE 30 MG/ML
30 SYRINGE (ML) INJECTION ONCE
Refills: 0 | Status: DISCONTINUED | OUTPATIENT
Start: 2021-06-29 | End: 2021-06-29

## 2021-06-29 RX ORDER — DIAZEPAM 5 MG
5 TABLET ORAL EVERY 12 HOURS
Refills: 0 | Status: DISCONTINUED | OUTPATIENT
Start: 2021-06-29 | End: 2021-06-30

## 2021-06-29 RX ORDER — ACETAMINOPHEN 500 MG
650 TABLET ORAL EVERY 6 HOURS
Refills: 0 | Status: DISCONTINUED | OUTPATIENT
Start: 2021-06-29 | End: 2021-07-04

## 2021-06-29 RX ORDER — IBUPROFEN 200 MG
600 TABLET ORAL EVERY 6 HOURS
Refills: 0 | Status: DISCONTINUED | OUTPATIENT
Start: 2021-06-29 | End: 2021-07-04

## 2021-06-29 RX ORDER — KETOROLAC TROMETHAMINE 30 MG/ML
15 SYRINGE (ML) INJECTION ONCE
Refills: 0 | Status: DISCONTINUED | OUTPATIENT
Start: 2021-06-29 | End: 2021-06-29

## 2021-06-29 RX ADMIN — Medication 15 MILLIGRAM(S): at 21:41

## 2021-06-29 RX ADMIN — GABAPENTIN 300 MILLIGRAM(S): 400 CAPSULE ORAL at 21:41

## 2021-06-29 RX ADMIN — Medication 30 MILLIGRAM(S): at 15:37

## 2021-06-29 NOTE — ED PROVIDER NOTE - PROGRESS NOTE DETAILS
patient remains neuro intact, spine consulted for possible cord impingement from herniated discs. -Cinthia DO Reviewed all results with pt as well as plans for observation admission. Pt is comfortable with plan for obs stay. Questions answered. - Enrique Espinosa, PGY-3

## 2021-06-29 NOTE — ED CDU PROVIDER INITIAL DAY NOTE - ATTENDING CONTRIBUTION TO CARE
I, Brenda Ruiz, participated in the care of this patient with the PA. I discussed the history and physical exam findings as well as lab results and plan of care with the PA. I agree with PA's history, physical and assessment.     70 y/o F with PMh OA, GERD, anxiety presents for low back pain on the right side s/p a fall 3 days ago, was found to have an age-indeterminate L2 fx, placed in obs for MRI.    AP - well appearing, neuro-intact, no midline spinal TTP, abd soft NT/ND, RRR, lungs CTA B/L    Will keep in obs for MRI, pain control, NSx following.

## 2021-06-29 NOTE — CONSULT NOTE ADULT - SUBJECTIVE AND OBJECTIVE BOX
Patient is a 71y old  Female who presents with a chief complaint of     HPI:    pt c/o + -headache  /10 on the pain scale   + - Nausea /+ - Vomiting  admits denies weakness  admits denies numbness/ tingling  admist denies visual changes  admist denies C/T/LS  Spine pain    PAST MEDICAL:  Breast cancer, right - 1998 s/p chemo and radiation    IBS (irritable bowel syndrome)    Osteoporosis    Osteoarthritis    Rheumatoid arthritis    Thyroid nodule  benign - removed    GERD (gastroesophageal reflux disease)    Bladder prolapse, female, acquired    Uterine prolapse  Pessary    Shingles  2015    Spinal stenosis    Breast cancer  b/l DCIS    Anxiety    Urinary incontinence    UTI (urinary tract infection)    Hiatal hernia      SURGICAL HISTORY:  H/O ovarian cystectomy    H/O partial thyroidectomy    S/P ORIF (open reduction internal fixation) fracture  right wrist 07/2017    S/P breast lumpectomy  right 1998    History of tonsillectomy    H/O rhinoplasty    H/O bilateral mastectomy  with alloderm 9/ 2018    UTERINE PESSARY         SOCIAL HISTORY: + - EtOH, + - tobacco, + - drugs    FAMILY HISTORY:  Family history of lung cancer (Mother)  Family history of bladder cancer (Father)  Family history of myelofibrosis (Sibling)        ROS:  CONSTITUTIONAL: No fever, weight loss, or fatigue  EYES: No eye pain, visual disturbances, or discharge  ENMT:  No difficulty hearing, tinnitus, vertigo; No sinus or throat pain  NECK: No pain or stiffness  RESPIRATORY: No cough, wheezing, chills or hemoptysis; No shortness of breath  CARDIOVASCULAR: No chest pain, palpitations, dizziness, or leg swelling  GASTROINTESTINAL: No abdominal or epigastric pain. No nausea, vomiting, or hematemesis; No diarrhea or constipation. No melena or hematochezia.  GENITOURINARY: No dysuria, frequency, hematuria, or incontinence  NEUROLOGICAL: No headaches, memory loss, loss of strength, numbness, or tremors  SKIN: No itching, burning, rashes, or lesions   LYMPH NODES: No enlarged glands  ENDOCRINE: No heat or cold intolerance; No hair loss  MUSCULOSKELETAL: No joint pain or swelling; No muscle, back, or extremity pain  PSYCHIATRIC: No depression, anxiety, mood swings, or difficulty sleeping  HEME/LYMPH: No easy bruising, or bleeding gums  ALLERGY AND IMMUNOLOGIC: No hives or eczema      MEDICATIONS  (STANDING):    MEDICATIONS  (PRN):  acetaminophen   Tablet .. 650 milliGRAM(s) Oral every 6 hours PRN Mild Pain (1 - 3)  diazepam    Tablet 5 milliGRAM(s) Oral every 12 hours PRN pain/ prior to MRI  gabapentin 300 milliGRAM(s) Oral every 6 hours PRN pain  ibuprofen  Tablet. 600 milliGRAM(s) Oral every 6 hours PRN Mild Pain (1 - 3)      Allergies:  latex (Hives)  No Known Drug Allergies    Intolerances: aspirin (Stomach Upset)      Vital Signs Last 24 Hrs  T(C): 36.6 (29 Jun 2021 19:21), Max: 37.3 (29 Jun 2021 13:23)  T(F): 97.9 (29 Jun 2021 19:21), Max: 99.1 (29 Jun 2021 13:23)  HR: 76 (29 Jun 2021 19:21) (64 - 76)  BP: 182/87 (29 Jun 2021 19:21) (142/95 - 182/87)  BP(mean): --  RR: 18 (29 Jun 2021 19:21) (18 - 18)  SpO2: 100% (29 Jun 2021 19:21) (97% - 100%)    PHYSICAL EXAM:  GENERAL: NAD, well-groomed, well-developed  HEAD:  Atraumatic, Normocephalic  EYES: EOMI, PERRLA, conjunctiva and sclera clear  ENMT:  Moist mucous membranes, Good dentition  NECK: Supple, No JVD  NERVOUS SYSTEM:  Alert & Oriented X3, Good concentration;   Motor Strength 5/5 B/L upper and lower extremities;    SPINE:   CHEST/LUNG: Clear  bilaterally; No rales, rhonchi, wheezing, or rubs  HEART: Regular rate and rhythm; No murmurs, rubs, or gallops  ABDOMEN: Soft, Nontender, Nondistended; Bowel sounds present  EXTREMITIES:  2+ Peripheral Pulses, No clubbing, cyanosis, or edema  LYMPH: No lymphadenopathy noted  SKIN: No rashes or lesions      RADIOLOGY & ADDITIONAL STUDIES:    6/26 CT C SPINE:  Multilevel degenerative changes. No acute fracture identified.    EXAM: CT LUMBAR SPINE  EXAM: CT THORACIC SPINE  PROCEDURE DATE: 06/29/2021  INTERPRETATION: CT thoracic and lumbar spine without contrast  History compression fracture, back pain, injury  Examination of the thoracic spine is negative for fracture, compression or subluxation or osseous destruction or demineralization. Moderately severe multilevel degenerative disc and endplate changes with vacuum involves the mid to lower thoracic levels exacerbated by kyphosis. There is a small focal disc protrusion on the left at T7-T8 encroaching on and mildly displacing and deforming the ventral spinal cord within modality limitations. There is a smaller right-sided protrusion at T8-T9 mildly deforming the thecal sac.  Examination of the lumbar spine demonstrates a likely subacute to chronic fracture of a syndesmophyte involving the anterior upper corner of L2. The margins are sclerotic and there is no displacement or disc space widening. The other vertebral bodies are grossly intact. Moderately severe degenerative disc change with vacuum involves L4-L5 on the right related to levoscoliosis. Ligamentous and facet hypertrophy further contributes to mild spinal stenosis at that level. The disc spaces are preserved and spinal canal elsewhere are relatively patent. The sacral ala are intact.  IMPRESSION:  Subacute to chronic anterior corner fracture of L2.  Disc protrusions in the mid thoracic levels with potential spinal cord impingement at T7-8.                 CC: Patient is a 71y old  Female who presents with a chief complaint of fall 3 days ago.   SOURCE: PATIENT HERSELF, COMPETENT SOURCE.   HPI: Patient is a 71y old  Female who presents to ED after call to return to hospital after her CT scans were reviewed by radiology from ED visit 3 days ago.   SHE ADMITS TO  ONGOING CHRONIC BACK PAIN THAT WAS EXACERBATED WITH FALL,  IS AMBULATORY AND VACUUMING TODAY.   SHE STATES PAIN IS CHRONIC, LOW SPINE AND SACRAL THAT RADIATES PAIN TO R BUTTOCK TO   RIGHT LATERAL THIGH, TO LOWER LEG AND FOOT INTERMITTENTLY.   DENIES NEED FOR CANE, DENIES LIMP WHEN WALKING. EXACERBATED WITH BENDING OR LAYING FLAT.   -headache    denies emerita weakness  deneis  numbness/ tingling  denies C/T/L  Spine pain admits to sacral pain that radiated to RLE       PAST MEDICAL:  DCIS  Breast cancer 1998, right - s/p chemo and radiation, and left 2018  IBS (irritable bowel syndrome)  Osteoporosis  Osteoarthritis  Rheumatoid arthritis  Thyroid nodule benign - removed  GERD (gastroesophageal reflux disease)  Bladder prolapse, female, acquired  Uterine prolapse w Urinary incontinence   Shingles 2015  Spinal stenosis  Anxiety  UTI (urinary tract infection)  Hiatal hernia      SURGICAL HISTORY:  H/O ovarian cystectomy  H/O partial thyroidectomy  S/P ORIF (open reduction internal fixation) fracture  right wrist 07/2017    S/P breast lumpectomy  right 1998    History of tonsillectomy    H/O rhinoplasty    H/O bilateral mastectomy  with alloderm 9/ 2018  with breast implants    UTERINE PESSARY         SOCIAL HISTORY:  - EtOH,  - tobacco, - drugs    FAMILY HISTORY:  Family history of lung cancer (Mother)  Family history of bladder cancer (Father)  Family history of myelofibrosis (Sibling)        ROS:  CONSTITUTIONAL: No fever, weight loss, or fatigue  EYES: No eye pain, visual disturbances, or discharge  ENMT:  No difficulty hearing, tinnitus, vertigo; No sinus or throat pain  NECK: No pain or stiffness  RESPIRATORY: No cough, wheezing, chills or hemoptysis; No shortness of breath  CARDIOVASCULAR: No chest pain, palpitations, dizziness, or leg swelling  GASTROINTESTINAL: No abdominal or epigastric pain. No nausea, vomiting, or hematemesis; No diarrhea or constipation. No melena or hematochezia.  GENITOURINARY: No dysuria, frequency, hematuria, + night time  incontinence, has pessary for incontinence.   NEUROLOGICAL: No headaches, memory loss, loss of strength, numbness, or tremors, admits to sacral pain radiating to RLE, chronic t and L spine pain  SKIN: No itching, burning, rashes, or lesions   LYMPH NODES: No enlarged glands  ENDOCRINE: No heat or cold intolerance; No hair loss  MUSCULOSKELETAL: + joint pain + swelling; + muscle, + chronic back and  extremity pain (OA, RA DJD)   PSYCHIATRIC: No depression, +_anxiety, no mood swings, or difficulty sleeping  HEME/LYMPH: No easy bruising, or bleeding gums  ALLERGY AND IMMUNOLOGIC: No hives or eczema      MEDICATIONS  (STANDING):    MEDICATIONS  (PRN):  acetaminophen   Tablet .. 650 milliGRAM(s) Oral every 6 hours PRN Mild Pain (1 - 3)  diazepam    Tablet 5 milliGRAM(s) Oral every 12 hours PRN pain/ prior to MRI  gabapentin 300 milliGRAM(s) Oral every 6 hours PRN pain  ibuprofen  Tablet. 600 milliGRAM(s) Oral every 6 hours PRN Mild Pain (1 - 3)      Allergies:  latex (Hives)  No Known Drug Allergies    Intolerances: aspirin (Stomach Upset)      Vital Signs Last 24 Hrs  T(C): 36.6 (29 Jun 2021 19:21), Max: 37.3 (29 Jun 2021 13:23)  T(F): 97.9 (29 Jun 2021 19:21), Max: 99.1 (29 Jun 2021 13:23)  HR: 76 (29 Jun 2021 19:21) (64 - 76)  BP: 182/87 (29 Jun 2021 19:21) (142/95 - 182/87)  BP(mean): --  RR: 18 (29 Jun 2021 19:21) (18 - 18)  SpO2: 100% (29 Jun 2021 19:21) (97% - 100%)    PHYSICAL EXAM:  GENERAL: NAD, well-groomed, well-developed  HEAD:  + tenderness right  high parietal, skin intact ,  Normocephalic  EYES: EOMI, PERRLA, conjunctiva and sclera clear  ENMT:  Moist mucous membranes, Good dentition  NECK: Supple, No JVD  NERVOUS SYSTEM:  Alert & Oriented X3, Good concentration;   per, eomi, gross vision and visual fields intact.   cranial nerves 2-12 intact,   Motor Strength 5/5 B/L upper and  left  lower extremities,   RLL  5-/5 and guarding for back pain w hip and knee flexion.  Ankle/ehl 5/5  sensory intact all   fine motor and coordination intact with moderate difficulty heel/shin w RLE   SPINE: No point tenderness C/T/L SPINE, + mild baseline/chronic tender t and  L spine.   + sacral and coxyx tenderness  CHEST/LUNG: Clear  bilaterally; No rales, rhonchi, wheezing, or rubs  HEART: Regular rate   ABDOMEN: Soft, Nontender, Nondistended; Bowel sounds present  EXTREMITIES:  2+ Peripheral Pulses, No clubbing, cyanosis, or edema  LYMPH: No lymphadenopathy noted  SKIN: No rashes or lesions      RADIOLOGY & ADDITIONAL STUDIES:  6/26 CT C SPINE:  Multilevel degenerative changes. No acute fracture identified.    EXAM: CT LUMBAR SPINE  EXAM: CT THORACIC SPINE  PROCEDURE DATE: 06/29/2021  INTERPRETATION: CT thoracic and lumbar spine without contrast  History compression fracture, back pain, injury  Examination of the thoracic spine is negative for fracture, compression or subluxation or osseous destruction or demineralization. Moderately severe multilevel degenerative disc and endplate changes with vacuum involves the mid to lower thoracic levels exacerbated by kyphosis. There is a small focal disc protrusion on the left at T7-T8 encroaching on and mildly displacing and deforming the ventral spinal cord within modality limitations. There is a smaller right-sided protrusion at T8-T9 mildly deforming the thecal sac.  Examination of the lumbar spine demonstrates a likely subacute to chronic fracture of a syndesmophyte involving the anterior upper corner of L2. The margins are sclerotic and there is no displacement or disc space widening. The other vertebral bodies are grossly intact. Moderately severe degenerative disc change with vacuum involves L4-L5 on the right related to levoscoliosis. Ligamentous and facet hypertrophy further contributes to mild spinal stenosis at that level. The disc spaces are preserved and spinal canal elsewhere are relatively patent. The sacral ala are intact.  IMPRESSION:  Subacute to chronic anterior corner fracture of L2.  Disc protrusions in the mid thoracic levels with potential spinal cord impingement at T7-8.                 CC: Patient is a 71y old  Female who presents with a chief complaint of fall 3 days ago.     SOURCE: PATIENT HERSELF, COMPETENT SOURCE.     HPI: Patient is a 71y old  Female who presents to ED after call to return to hospital after her CT scans were reviewed by radiology from ED visit 3 days ago.   SHE ADMITS TO  ONGOING CHRONIC BACK PAIN THAT WAS EXACERBATED WITH FALL, SLIP ON H20 ONTO BACK.   IS AMBULATORY AND VACUUMING TODAY.   SHE STATES PAIN IS CHRONIC  FULL T AND L  SPINE CURRENTLY PAIN LOW SPINE AND SACRAL THAT RADIATES PAIN TO R BUTTOCK TO   RIGHT LATERAL THIGH, TO LOWER LEG AND FOOT INTERMITTENTLY. RADICULAR PAIN IS CHRONIC ALSO.   DENIES NEED FOR CANE, DENIES LIMP WHEN WALKING. PAIN IS  EXACERBATED WITH BENDING OR LAYING FLAT.  ADMITS TO CHRONIC URINARY INCONTINENCE, HAS PESSARY.    -headache    denies new  weakness  denies  numbness/ tingling  denies C/T/L  Spine pain admits to sacral pain that radiated to RLE       PAST MEDICAL:  DCIS  Breast cancer 1998, right - s/p chemo and radiation, and left 2018  IBS (irritable bowel syndrome)  Osteoporosis  Osteoarthritis  Rheumatoid arthritis  Thyroid nodule benign - removed  GERD (gastroesophageal reflux disease)  Bladder prolapse, female, acquired  Uterine prolapse w Urinary incontinence   Shingles 2015  Spinal stenosis  Anxiety  UTI (urinary tract infection)  Hiatal hernia      SURGICAL HISTORY:  H/O ovarian cystectomy  H/O partial thyroidectomy  S/P ORIF (open reduction internal fixation) fracture  right wrist 07/2017    S/P breast lumpectomy  right 1998    History of tonsillectomy    H/O rhinoplasty    H/O bilateral mastectomy  with alloderm 9/ 2018  with breast implants    UTERINE PESSARY         SOCIAL HISTORY:  - EtOH,  - tobacco, - drugs    FAMILY HISTORY:  Family history of lung cancer (Mother)  Family history of bladder cancer (Father)  Family history of myelofibrosis (Sibling)        ROS:  CONSTITUTIONAL: No fever, weight loss, or fatigue  EYES: No eye pain, visual disturbances, or discharge  ENMT:  No difficulty hearing, tinnitus, vertigo; No sinus or throat pain  NECK: No pain or stiffness  RESPIRATORY: No cough, wheezing, chills or hemoptysis; No shortness of breath  CARDIOVASCULAR: No chest pain, palpitations, dizziness, or leg swelling  GASTROINTESTINAL: No current abdominal or epigastric pain. No nausea, vomiting, or hematemesis; No diarrhea or constipation. No melena or hematochezia.  GENITOURINARY: No dysuria, frequency, hematuria, + night time  incontinence, has pessary for incontinence.   NEUROLOGICAL: No headaches, memory loss, loss of strength, numbness, or tremors, admits to sacral pain radiating to RLE, chronic t and L spine pain  SKIN: No itching, burning, rashes, or lesions   LYMPH NODES: No enlarged glands  ENDOCRINE: No heat or cold intolerance; No hair loss  MUSCULOSKELETAL: + joint pain + swelling; + muscle, + chronic back and  extremity pain (OA, RA DJD)   PSYCHIATRIC: No depression, + anxiety, no mood swings, or difficulty sleeping  HEME/LYMPH: No easy bruising, or bleeding gums  ALLERGY AND IMMUNOLOGIC: No hives or eczema      MEDICATIONS  (STANDING):    MEDICATIONS  (PRN):  acetaminophen   Tablet .. 650 milliGRAM(s) Oral every 6 hours PRN Mild Pain (1 - 3)  diazepam    Tablet 5 milliGRAM(s) Oral every 12 hours PRN pain/ prior to MRI  gabapentin 300 milliGRAM(s) Oral every 6 hours PRN pain  ibuprofen  Tablet. 600 milliGRAM(s) Oral every 6 hours PRN Mild Pain (1 - 3)      Allergies:  latex (Hives)  No Known Drug Allergies    Intolerances: aspirin (Stomach Upset)      Vital Signs Last 24 Hrs  T(C): 36.6 (29 Jun 2021 19:21), Max: 37.3 (29 Jun 2021 13:23)  T(F): 97.9 (29 Jun 2021 19:21), Max: 99.1 (29 Jun 2021 13:23)  HR: 76 (29 Jun 2021 19:21) (64 - 76)  BP: 182/87 (29 Jun 2021 19:21) (142/95 - 182/87)  BP(mean): --  RR: 18 (29 Jun 2021 19:21) (18 - 18)  SpO2: 100% (29 Jun 2021 19:21) (97% - 100%)    PHYSICAL EXAM:  GENERAL: NAD, well-groomed, well-developed  HEAD:  + tenderness right  high parietal, skin intact ,  Normocephalic  EYES: EOMI, PERRLA, conjunctiva and sclera clear  ENMT:  Moist mucous membranes, Good dentition  NECK: Supple, No JVD  NERVOUS SYSTEM:  Alert & Oriented X3, Good concentration;   per, eomi, gross vision and visual fields intact.   cranial nerves 2-12 intact,   Motor Strength 5/5 B/L upper and  left  lower extremities,   RLL  5-/5 and guarding for back pain w hip and knee flexion.  Ankle/ehl 5/5  sensory intact all   fine motor and coordination intact with moderate difficulty heel/shin w RLE   SPINE: No point tenderness C/T/L SPINE, + mild baseline/chronic tender t and  L spine.   + sacral and coxyx tenderness  CHEST/LUNG: Clear  bilaterally; No rales, rhonchi, wheezing, or rubs  HEART: Regular rate   ABDOMEN: Soft, Nontender, Nondistended; Bowel sounds present  EXTREMITIES:  2+ Peripheral Pulses, No clubbing, cyanosis, or edema  LYMPH: No lymphadenopathy noted  SKIN: No rashes or lesions      RADIOLOGY & ADDITIONAL STUDIES:  6/26 CT C SPINE:  Multilevel degenerative changes. No acute fracture identified.    EXAM: CT LUMBAR SPINE  EXAM: CT THORACIC SPINE  PROCEDURE DATE: 06/29/2021  INTERPRETATION: CT thoracic and lumbar spine without contrast  History compression fracture, back pain, injury  Examination of the thoracic spine is negative for fracture, compression or subluxation or osseous destruction or demineralization. Moderately severe multilevel degenerative disc and endplate changes with vacuum involves the mid to lower thoracic levels exacerbated by kyphosis. There is a small focal disc protrusion on the left at T7-T8 encroaching on and mildly displacing and deforming the ventral spinal cord within modality limitations. There is a smaller right-sided protrusion at T8-T9 mildly deforming the thecal sac.  Examination of the lumbar spine demonstrates a likely subacute to chronic fracture of a syndesmophyte involving the anterior upper corner of L2. The margins are sclerotic and there is no displacement or disc space widening. The other vertebral bodies are grossly intact. Moderately severe degenerative disc change with vacuum involves L4-L5 on the right related to levoscoliosis. Ligamentous and facet hypertrophy further contributes to mild spinal stenosis at that level. The disc spaces are preserved and spinal canal elsewhere are relatively patent. The sacral ala are intact.  IMPRESSION:  Subacute to chronic anterior corner fracture of L2.  Disc protrusions in the mid thoracic levels with potential spinal cord impingement at T7-8.

## 2021-06-29 NOTE — ED ADULT NURSE NOTE - CAS TRG GENERAL NORM CIRC DET
Capillary refill less/equal to 2 seconds no diarrhea/no blood in stool/no burning urination/no chills/no hematuria/no fever/no dysuria/no abdominal distension

## 2021-06-29 NOTE — CONSULT NOTE ADULT - ASSESSMENT
IMP:  PATIENT WITH HX OF FALL 3 DAYS AGO, WAS TO ED AND EVAUATED AND SENT HOME. SHE REPRESENTS TODAY WITH ONGOING BACK PAIN, IS AMBULATORY     CT T & L SPINE WITH Subacute to chronic anterior corner fracture of L2.  Disc protrusions in the mid thoracic levels with potential spinal cord impingement at T7-8.          PLAN:   MRI T AND L SPINE    IMP:  PATIENT WITH HX OF FALL 3 DAYS AGO, WAS TO ED AND EVAUATED AND SENT HOME.   WAS CALLED BACK BY [facility] ED FOR CONCERN RE CT RESULTS     SHE ADMITS TO  ONGOING CHRONIC BACK PAIN T AND L SPINE AND THAT SACRAL PAIN WAS EXACERBATED WITH FALL,  IS AMBULATORY AND VACUUMING TODAY.   SHE STATES PAIN IS CHRONIC, LOW SPINE AND SACRAL THAT RADIATES PAIN TO R BUTTOCK TO   RIGHT LATERAL THIGH, TO LOWER LEG AND FOOT INTERMITTENTLY.   DENIES NEED FOR CANE, DENIES LIMP WHEN WALKING. EXACERBATED WITH BENDING OR LAYING FLAT.     CT T & L SPINE WITH Subacute to chronic anterior corner fracture of L2.  Disc protrusions in the mid thoracic levels with potential spinal cord impingement at T7-8.    ON EXAM:   Motor Strength 5/5 B/L upper and  left  lower extremities,   RLL  5-/5 and guarding for back pain w hip and knee flexion.  Ankle/ehl 5/5  sensory intact all   fine motor and coordination intact with moderate difficulty heel/shin w RLE   SPINE: No point tenderness C/T/L SPINE, + mild baseline/chronic tender t and  L spine.   + sacral and coxyx tenderness      PLAN:   MRI T AND L SPINE

## 2021-06-29 NOTE — ED PROVIDER NOTE - PHYSICAL EXAMINATION
Gen: no acute distress  Head: normocephalic, atraumatic  Lung: CTAB, no respiratory distress, no wheezing, rales, rhonchi  CV: normal s1/s2, rrr,   Abd: soft, non-tender, non-distended  MSK: No edema, no visible deformities, full range of motion in all 4 extremities  Spine: midline t/l spine tenderness, no stepoffs  Neuro: No focal neurologic deficits  Skin: No rash   Psych: normal affect

## 2021-06-29 NOTE — ED CDU PROVIDER INITIAL DAY NOTE - OBJECTIVE STATEMENT
72 y/o F pt with hx of osteoarthritis, gerd, anxiety presenting today with for reevaluation after a fall 3 days prior. Pt fell backwards, hitting her head, had negative CT h/n, and XR l-spine with findings for age-indeterminate L2 fx. Pt continuing to c/o pain to this area, is able to ambulate. Pt denies any chest pain, dyspnea, fevers, n/v/d, abdominal pain, dysuria, headache, cough, congestion, sore throat, neck pain, weakness, numbness, tingling, dizziness, syncope, or other complaint.

## 2021-06-29 NOTE — CONSULT NOTE ADULT - CONSULT REASON
Subacute to chronic anterior corner fracture of L2.  Disc protrusions in the mid thoracic levels with potential spinal cord impingement at T7-8.

## 2021-06-29 NOTE — ED ADULT NURSE REASSESSMENT NOTE - NSIMPLEMENTINTERV_GEN_ALL_ED
Implemented All Fall Risk Interventions:  [place] to call system. Call bell, personal items and telephone within reach. Instruct patient to call for assistance. Room bathroom lighting operational. Non-slip footwear when patient is off stretcher. Physically safe environment: no spills, clutter or unnecessary equipment. Stretcher in lowest position, wheels locked, appropriate side rails in place. Provide visual cue, wrist band, yellow gown, etc. Monitor gait and stability. Monitor for mental status changes and reorient to person, place, and time. Review medications for side effects contributing to fall risk. Reinforce activity limits and safety measures with patient and family.

## 2021-06-29 NOTE — ED PROVIDER NOTE - OBJECTIVE STATEMENT
70 y/o F pt with hx of osteoarthritis, gerd, anxiety presenting today with for reevaluation after a fall 3 days prior. Pt fell backwards, hitting her head, had negative CT h/n, and XR l-spine with findings for age-indeterminate L2 fx. Pt continuing to c/o pain to this area, is able to ambulate. Pt denies any chest pain, dyspnea, fevers, n/v/d, abdominal pain, dysuria, headache, cough, congestion, sore throat, neck pain, weakness, numbness, tingling, dizziness, syncope, or other complaint.

## 2021-06-29 NOTE — ED ADULT NURSE NOTE - NSIMPLEMENTINTERV_GEN_ALL_ED
Implemented All Fall with Harm Risk Interventions:  [place] to call system. Call bell, personal items and telephone within reach. Instruct patient to call for assistance. Room bathroom lighting operational. Non-slip footwear when patient is off stretcher. Physically safe environment: no spills, clutter or unnecessary equipment. Stretcher in lowest position, wheels locked, appropriate side rails in place. Provide visual cue, wrist band, yellow gown, etc. Monitor gait and stability. Monitor for mental status changes and reorient to person, place, and time. Review medications for side effects contributing to fall risk. Reinforce activity limits and safety measures with patient and family. Provide visual clues: red socks.

## 2021-06-29 NOTE — ED PROVIDER NOTE - CLINICAL SUMMARY MEDICAL DECISION MAKING FREE TEXT BOX
Pt presenting with persistent low back pain after fall 3 days ago and findings for age-indeterminate L2 fx. WIll order ct t/l spine, and reeval.

## 2021-06-29 NOTE — ED CDU PROVIDER INITIAL DAY NOTE - DETAILS
Pt with subacute Fx with incidental finding of bulging disk (Pt neuro intact) with possible cord impingement, Placed in obs of MR.

## 2021-06-29 NOTE — ED ADULT NURSE NOTE - OBJECTIVE STATEMENT
Pt. presents alert and oriented x 4 to ED complaining of back pain. Pt. states she was seen at [facility] on 6/26 s/p mechanical fall and discharged home, then called back and told to come back and be re-evaluated due to possible L2 +fracture seen on XR. Pt. reports 5/10 lower back pain however is ambulatory and independent in NAD. Steady gait. Pt. CARRIZALES, skin warm and dry, pt denies numbness or tingling. Pt. is smiling, calm, and cooperative. VSS.

## 2021-06-29 NOTE — ED CDU PROVIDER INITIAL DAY NOTE - MEDICAL DECISION MAKING DETAILS
PT with abnormal CT placed in obs for Diagnostic uncertainty. PT seen BY OBS PA found to be appropriate CDU PT care transferred to PA.

## 2021-06-29 NOTE — ED ADULT NURSE NOTE - CAS ELECT INFOMATION PROVIDED
DC instructions provided and reviewed with pt by RANULFO Levin. Patient in understanding of all dc instructions. No further questions for the RN regarding DC. VSS./DC instructions

## 2021-06-29 NOTE — ED ADULT TRIAGE NOTE - CHIEF COMPLAINT QUOTE
patient had a fall on Saturday and had imaging done, was called back for possible fracture of L2. patient c/o numbness and tingling down b/l legs, worse on right.

## 2021-06-29 NOTE — ED PROVIDER NOTE - ATTENDING CONTRIBUTION TO CARE
I, Albina Vicente, have personally seen and examined this patient. I have fully participated in the care of this patient. I have reviewed all pertinent clinical information, including history, physical exam, plan and the Resident's note and agree except as noted below.     Pt with fall 3 days ago had xrays, called back for possible fx. patient with midline pain but toleratble. no weakness. no bladder/bowel changes. ambulating without assistance     Gen: NAD, AOx3  Head: NCAT  HEENT: EOMI, oral mucosa moist, normal conjunctiva, neck supple  Lung: no respiratory distress  CV:  Normal perfusion  MSK: No edema, no visible deformities, +midline spine ttp  Neuro: No focal neurologic deficits  Skin: No rash   Psych: normal affect     will obtain ct to eval for fx

## 2021-06-30 VITALS
SYSTOLIC BLOOD PRESSURE: 143 MMHG | OXYGEN SATURATION: 95 % | DIASTOLIC BLOOD PRESSURE: 88 MMHG | HEART RATE: 77 BPM | RESPIRATION RATE: 18 BRPM | TEMPERATURE: 98 F

## 2021-06-30 LAB
APPEARANCE UR: CLEAR — SIGNIFICANT CHANGE UP
BACTERIA # UR AUTO: ABNORMAL
BILIRUB UR-MCNC: NEGATIVE — SIGNIFICANT CHANGE UP
COLOR SPEC: YELLOW — SIGNIFICANT CHANGE UP
DIFF PNL FLD: ABNORMAL
EPI CELLS # UR: SIGNIFICANT CHANGE UP
GLUCOSE UR QL: NEGATIVE MG/DL — SIGNIFICANT CHANGE UP
KETONES UR-MCNC: NEGATIVE — SIGNIFICANT CHANGE UP
LEUKOCYTE ESTERASE UR-ACNC: ABNORMAL
NITRITE UR-MCNC: NEGATIVE — SIGNIFICANT CHANGE UP
PH UR: 6 — SIGNIFICANT CHANGE UP (ref 5–8)
PROT UR-MCNC: 15 MG/DL
RBC CASTS # UR COMP ASSIST: SIGNIFICANT CHANGE UP /HPF (ref 0–4)
SP GR SPEC: 1.02 — SIGNIFICANT CHANGE UP (ref 1.01–1.02)
UROBILINOGEN FLD QL: NEGATIVE MG/DL — SIGNIFICANT CHANGE UP
WBC UR QL: ABNORMAL

## 2021-06-30 PROCEDURE — 99283 EMERGENCY DEPT VISIT LOW MDM: CPT

## 2021-06-30 PROCEDURE — 72128 CT CHEST SPINE W/O DYE: CPT

## 2021-06-30 PROCEDURE — 72148 MRI LUMBAR SPINE W/O DYE: CPT | Mod: 26,MA

## 2021-06-30 PROCEDURE — 72195 MRI PELVIS W/O DYE: CPT | Mod: 26,MA

## 2021-06-30 PROCEDURE — 72146 MRI CHEST SPINE W/O DYE: CPT

## 2021-06-30 PROCEDURE — 81001 URINALYSIS AUTO W/SCOPE: CPT

## 2021-06-30 PROCEDURE — 72148 MRI LUMBAR SPINE W/O DYE: CPT

## 2021-06-30 PROCEDURE — 96372 THER/PROPH/DIAG INJ SC/IM: CPT

## 2021-06-30 PROCEDURE — 99284 EMERGENCY DEPT VISIT MOD MDM: CPT | Mod: 25

## 2021-06-30 PROCEDURE — 72195 MRI PELVIS W/O DYE: CPT

## 2021-06-30 PROCEDURE — 87086 URINE CULTURE/COLONY COUNT: CPT

## 2021-06-30 PROCEDURE — 72146 MRI CHEST SPINE W/O DYE: CPT | Mod: 26,MA

## 2021-06-30 PROCEDURE — 99217: CPT

## 2021-06-30 PROCEDURE — G0378: CPT

## 2021-06-30 PROCEDURE — 72131 CT LUMBAR SPINE W/O DYE: CPT

## 2021-06-30 RX ORDER — OXYCODONE AND ACETAMINOPHEN 5; 325 MG/1; MG/1
1 TABLET ORAL EVERY 4 HOURS
Refills: 0 | Status: DISCONTINUED | OUTPATIENT
Start: 2021-06-30 | End: 2021-06-30

## 2021-06-30 RX ORDER — IBUPROFEN 200 MG
1 TABLET ORAL
Qty: 20 | Refills: 0
Start: 2021-06-30 | End: 2021-07-04

## 2021-06-30 RX ORDER — DIAZEPAM 5 MG
1 TABLET ORAL
Qty: 9 | Refills: 0
Start: 2021-06-30 | End: 2021-07-02

## 2021-06-30 RX ORDER — CEPHALEXIN 500 MG
1 CAPSULE ORAL
Qty: 28 | Refills: 0
Start: 2021-06-30 | End: 2021-07-06

## 2021-06-30 RX ORDER — OXYCODONE AND ACETAMINOPHEN 5; 325 MG/1; MG/1
1 TABLET ORAL
Qty: 12 | Refills: 0
Start: 2021-06-30 | End: 2021-07-02

## 2021-06-30 RX ADMIN — OXYCODONE AND ACETAMINOPHEN 1 TABLET(S): 5; 325 TABLET ORAL at 11:58

## 2021-06-30 RX ADMIN — Medication 15 MILLIGRAM(S): at 00:31

## 2021-06-30 RX ADMIN — Medication 5 MILLIGRAM(S): at 07:59

## 2021-06-30 NOTE — ED CDU PROVIDER SUBSEQUENT DAY NOTE - PROGRESS NOTE DETAILS
NUTRITIONAL RE-ASSESSMENT AND GLYCEMIC CONTROL PLAN OF CARE     Vidya Members           81 y.o.           2/12/2017                 1. SBO (small bowel obstruction) (HCC)    2. Abdominal pain, LUQ (left upper quadrant)    3. Chronic pain of left knee    4. Chronic renal failure, unspecified stage    5. Anemia, unspecified type    6. Debility    7. Feeding difficulties        ASSESSMENT:    Based on most recent weight  of 153 lbs. , pt is underweight  related to inadequate caloric intake as evidenced by 86% ideal weight and BMI= 20.9kg/m2. .   Nutrient deficit as evidenced by npo status related to dysphagia. Pt for PEG in am.     INTERVENTIONS/PLAN:     Recommend TPN of 85 g amino acids/d, 300 g dextrose/d with 350 ml 20 % lipids/d at 100 ml/hr. This will provide 85 g protein, 1720 non-protein calories/day. When TF initiated recommend  Osmolite 1 dario Tubefeeding at 30ml/hr. Advance as tolerated to goal rate of 75 ml/hr. This will provide 1908 calories, 80 g protein and 1.5 liters free water/d from TF. SUBJECTIVE/OBJECTIVE:     Diet: npo  Patient Vitals for the past 100 hrs:   % Diet Eaten   02/19/17 1821 0 %   02/19/17 1250 0 %     Medications: [x]                Reviewed -  Levemir  10 units q 12 hrs while on TPN. Lab Results   Component Value Date/Time    Sodium 137 02/22/2017 04:25 AM    Potassium 5.0 02/22/2017 04:25 AM    Chloride 102 02/22/2017 04:25 AM    CO2 25 02/22/2017 04:25 AM    Anion gap 10 02/22/2017 04:25 AM    Glucose 130 02/22/2017 04:25 AM    BUN 91 02/22/2017 04:25 AM    Creatinine 2.45 02/22/2017 04:25 AM    Calcium 8.2 02/22/2017 04:25 AM    Magnesium 2.4 02/21/2017 12:55 PM    Phosphorus 3.5 02/22/2017 04:25 AM    Albumin 3.5 02/12/2017 06:05 AM       Anthropometrics:  ,  , BMI (calculated): 20.6 [place] wt 178lbs.    Wt Readings from Last 1 Encounters:   02/22/17 68.9 kg (152 lb)      Ht Readings from Last 1 Encounters:   02/17/17 6' (1.829 m)     Wt Readings from Last 3 Encounters: 02/22/17 68.9 kg (152 lb)   01/04/16 65.8 kg (145 lb)   11/08/15 63.5 kg (140 lb)       Estimated Nutrition Needs: 1900 Kcals/day, Protein reqrts  84g/d   Fluid Requirements 2.1 liters/d  Based on:   [x]          Actual BW    []          IBW   []            Adjusted BW      Nutrition Diagnoses:   As stated above. Nutrition Interventions: TPN recommendations Goal:     Intake will meet >75% of energy and protein requirements by 2/27. Wt maintenance or gain 1 lb per week by 3/4. Glucose will be within target range by 2/25.      Nutrition Monitoring and Evaluation      []     Monitor po intake on meal rounds  [x]     Continue inpatient monitoring and intervention  [x]     Other:TPN transition to TF when PEG available for use    Nutrition Risk:  [x]   High     []  Moderate    []  Minimal/Uncompromised    Maida Reyes, RD patient does not require enhanced supervision

## 2021-06-30 NOTE — ED CDU PROVIDER SUBSEQUENT DAY NOTE - ATTENDING CONTRIBUTION TO CARE
I, Maxi Caldwell, have personally performed a face to face diagnostic evaluation on this patient. I have reviewed the ACP note and agree with the history, exam and plan of care, except as noted.    patient placed in Obs pending neuro surgical consult and MRI for L2 fracture.

## 2021-06-30 NOTE — ED CDU PROVIDER DISPOSITION NOTE - PATIENT PORTAL LINK FT
You can access the FollowMyHealth Patient Portal offered by [facility] by registering at the following website: [email or web address]. By joining [email or web address]’s FollowMyHealth portal, you will also be able to view your health information using other applications (apps) compatible with our system.

## 2021-06-30 NOTE — ED ADULT NURSE REASSESSMENT NOTE - COMFORT CARE
ambulated to bathroom/meal provided/plan of care explained/po fluids offered/side rails up/wait time explained
ambulated to bathroom/meal provided/plan of care explained/po fluids offered/repositioned/wait time explained

## 2021-06-30 NOTE — ED CDU PROVIDER DISPOSITION NOTE - CARE PROVIDER_API CALL
Ji Hong; PhD)  Neurosurgery  [address] [place] [address]  Phone: [phone]  Fax: [phone]  Follow Up Time:     Bart Cruz)  Orthopaedic Surgery  [address] [place] [address]  Phone: [phone]  Fax: [phone]  Follow Up Time:

## 2021-06-30 NOTE — ED CDU PROVIDER DISPOSITION NOTE - CLINICAL COURSE
72 y/o F pt with hx of osteoarthritis, gerd, anxiety presenting today with for reevaluation after a fall 3 days prior. Pt fell backwards, hitting her head, had negative CT h/n, and XR l-spine with findings for age-indeterminate L2 fx. Pt continuing to c/o pain to this area, is able to ambulate. Pt denies any chest pain, dyspnea, fevers, n/v/d, abdominal pain, dysuria, headache, cough, congestion, sore throat, neck pain, weakness, numbness, tingling, dizziness, syncope, or other complaint.  While in ED had CT which confirmed age indeterminant fracture of L-spine.  Patient seen by neurosurgery who recommended MRI.  MRI confirmed chronic L2 fracture and MRI pelvis confirmed acute sacral FX.  Lesion on spine noted, a non-emergent MRI with IV dye recommended.  Patient informed of findings, will f/u with neurosurgery and orthopedics as outpatient.  RX Valium, IBU 800mg and Percocet to [facility] in [place].  Incidental prelim UTI on UA showed, RX Keflex, pending urine culture.  Patient freely walking in ED with no requirement of assisted devices. Patient given copies of all results, understands and agrees to proceed.

## 2021-06-30 NOTE — ED CDU PROVIDER SUBSEQUENT DAY NOTE - HISTORY
PT placed in OBS, has had no acute incidents or complaints while in CDU PT is stable. PT Placed in OBS for subacute Fx with incidental finding of bulging disk (Pt neuro intact) with possible cord impingement, Placed in obs of MR.

## 2021-06-30 NOTE — ED ADULT NURSE REASSESSMENT NOTE - NS ED NURSE REASSESS COMMENT FT1
Patient c/o low back pain, RANULFO Levin notified, new orders received fro percoset po. VSS. Patient pending MRI read and neurosx consult. No further questions for the RN. Will continue to monitor.
Pt. transported to CDU, report given to CDU RICHIE Horne. Pt. safety and comfort measures maintained. Handoff and transfer of care occurred @ 2013.
Pt resting comfortably in bed no complaints for RN VSS, will continue to monitor.
Assumed care of the patient at 0730. Verbal report received from Vipin QUIROZ, Patient A&Ox4. No s/s of acute distress. states lower back pain radiating down to right leg persists, intermittent numbness to right LE. Ambulatory with steady gait. Patient pending MRI testing. No neuro deficits noted. Sensory intact. Patient in understanding of plan of care. Patient with no further questions for the RN. Resting in comfort. Call bell within reach and encouraged to use when assistance needed. Will continue to monitor.
Assumed care of the Pt at 2008. verbal report received from RICHIE Montemayor. Pt is AOx4 in no acute distress. pt Denies any chest pain, shortness of breath, nausea or dizziness at this time complaining of lower back pain denies any loss of bladder or bowel control. Pt with no neurological deficits noted. meds to be given as per orders. pt is ambulatory with steady gait. PA made aware of BP all other VSS. Pt given call bell, call bell within reach, pt instructed to use call bell when assistance is needed. Pt pending MRI plan of care explained, wait time explained, pt in understanding of plan of care will continue to monitor.

## 2021-06-30 NOTE — ED CDU PROVIDER DISPOSITION NOTE - ATTENDING CONTRIBUTION TO CARE
I, Maxi Caldwell, have personally performed a face to face diagnostic evaluation on this patient. I have reviewed the ACP note and agree with the history, exam and plan of care, except as noted.    72 yo hx of osteoarthritis, gerd, anxiety found to have chronic L2 fracture and acute sacral fracture. patient seen and cleared by neurosurgery. lesion found on spine that recommend non-emgent MRI with IV contrast. patient has normal ambulation. keflex given for UTI.

## 2021-06-30 NOTE — ED ADULT NURSE REASSESSMENT NOTE - GENERAL PATIENT STATE
comfortable appearance/cooperative
comfortable appearance/cooperative/smiling/interactive
comfortable appearance/cooperative

## 2021-06-30 NOTE — ED CDU PROVIDER DISPOSITION NOTE - NSFOLLOWUPINSTRUCTIONS_ED_ALL_ED_FT
Please follow up with neurosurgery and orthopedics    Take pain medications as prescribed    You have a chronic L2 fracture and an acute sacral fracture and an indeterminent lesion on spine.  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  A non-emergent MRI of lumbar spine with IV dye is recommended as outpatient    Return if symptoms worsen or persist    Back Pain    Back pain is very common in adults. The cause of back pain is rarely dangerous and the pain often gets better over time. The cause of your back pain may not be known and may include strain of muscles or ligaments, degeneration of the spinal disks, or arthritis. Occasionally the pain may radiate down your leg(s). Over-the-counter medicines to reduce pain and inflammation are often the most helpful. Stretching and remaining active frequently helps the healing process.     SEEK IMMEDIATE MEDICAL CARE IF YOU HAVE ANY OF THE FOLLOWING SYMPTOMS: bowel or bladder control problems, unusual weakness or numbness in your arms or legs, nausea or vomiting, abdominal pain, fever, dizziness/lightheadedness.    Fracture    A fracture is a break in one of your bones. This can occur from a variety of injuries, especially traumatic ones. Symptoms include pain, bruising, or swelling. Do not use the injured limb. If a fracture is in one of the bones below your waist, do not put weight on that limb unless instructed to do so by your healthcare provider. Crutches or a cane may have been provided. A splint or cast may have been applied by your health care provider. Make sure to keep it dry and follow up with an orthopedist as instructed.    SEEK IMMEDIATE MEDICAL CARE IF YOU HAVE ANY OF THE FOLLOWING SYMPTOMS: numbness, tingling, increasing pain, or weakness in any part of the injured limb.    Urinary Tract Infection    A urinary tract infection (UTI) is an infection of any part of the urinary tract, which includes the kidneys, ureters, bladder, and urethra. Risk factors include ignoring your need to urinate, wiping back to front if female, being an uncircumcised male, and having diabetes or a weak immune system. Symptoms include frequent urination, pain or burning with urination, foul smelling urine, cloudy urine, pain in the lower abdomen, blood in the urine, and fever. If you were prescribed an antibiotic medicine, take it as told by your health care provider. Do not stop taking the antibiotic even if you start to feel better.    SEEK IMMEDIATE MEDICAL CARE IF YOU HAVE ANY OF THE FOLLOWING SYMPTOMS: severe back or abdominal pain, fever, inability to keep fluids or medicine down, dizziness/lightheadedness, or a change in mental status.

## 2021-06-30 NOTE — PROGRESS NOTE ADULT - SUBJECTIVE AND OBJECTIVE BOX
INTERVAL HPI/OVERNIGHT EVENTS:  Pt is a 71yf presented to the ed 4 days prior and then was called to return after radiology reviewed her films.  Pt has a hx of chronic back pain and this pain has exacerbated with the fall.  Pt states the pain is chronic at this time she is complaining of pain of the lower spine and sacral region. Pain increased with bending or laying flat.     MEDICATIONS  (STANDING):    MEDICATIONS  (PRN):  acetaminophen   Tablet .. 650 milliGRAM(s) Oral every 6 hours PRN Mild Pain (1 - 3)  diazepam    Tablet 5 milliGRAM(s) Oral every 12 hours PRN pain/ prior to MRI  gabapentin 300 milliGRAM(s) Oral every 6 hours PRN pain  ibuprofen  Tablet. 600 milliGRAM(s) Oral every 6 hours PRN Mild Pain (1 - 3)  oxycodone    5 mG/acetaminophen 325 mG 1 Tablet(s) Oral every 4 hours PRN Severe Pain (7 - 10)      Allergies  latex (Hives)  No Known Drug Allergies  Intolerances  aspirin (Stomach Upset)    Vital Signs Last 24 Hrs  T(C): 36.7 (2021 11:43), Max: 37.3 (2021 13:23)  T(F): 98 (2021 11:43), Max: 99.1 (2021 13:23)  HR: 67 (2021 11:43) (64 - 76)  BP: 151/81 (2021 11:43) (142/95 - 182/87)  BP(mean): --  RR: 18 (2021 11:43) (18 - 18)  SpO2: 97% (2021 11:43) (95% - 100%) BMI (kg/m2): 23.1 (21 @ 13:23)      PHYSICAL EXAM  GENERAL: NAD, well-groomed, well-developed  HEAD:  Atraumatic, Normocephalic  EYES: EOMI, PERRLA, conjunctiva and sclera clear  ENMT: No tonsillar erythema, exudates, or enlargement; Moist mucous membranes, Good dentition, No lesions  NECK: Supple, No JVD, Normal thyroid  NERVOUS SYSTEM:  Alert & Oriented X3, Good concentration; Motor Strength 5/5 B/L upper and lower extremities; DTRs 2+ intact and symmetric  CHEST/LUNG: Clear to percussion bilaterally; No rales, rhonchi, wheezing, or rubs  HEART: Regular rate and rhythm; No murmurs, rubs, or gallops  ABDOMEN: Soft, Nontender, Nondistended; Bowel sounds present  EXTREMITIES:  2+ Peripheral Pulses, No clubbing, cyanosis, or edema  LYMPH: No lymphadenopathy noted  SKIN: No rashes or lesions      LABS:              Urinalysis Basic - ( 2021 11:53 )    Color: Yellow / Appearance: Clear / S.020 / pH: x  Gluc: x / Ketone: Negative  / Bili: Negative / Urobili: Negative mg/dL   Blood: x / Protein: 15 mg/dL / Nitrite: Negative   Leuk Esterase: Moderate / RBC: 0-2 /HPF / WBC 11-25   Sq Epi: x / Non Sq Epi: Few / Bacteria: Occasional      I&O's Detail    RADIOLOGY & ADDITIONAL TESTS:  < from: CT Thoracic Spine No Cont (21 @ 17:43) >   EXAM:  CT LUMBAR SPINE                         EXAM:  CT THORACIC SPINE                        PROCEDURE DATE:  2021    IMPRESSION:  Subacute to chronic anterior corner fracture of L2.  Disc protrusions in the mid thoracic levels with potential spinal cord impingement at T7-8.  < end of copied text >    < from: CT Cervical Spine No Cont (21 @ 14:54) >   EXAM:  CT CERVICAL SPINE                        PROCEDURE DATE:  2021    Multilevel degenerative changes. No acute fracture identified.  < end of copied text >    < from: MR Lumbar Spine No Cont (21 @ 10:05) >   EXAM:  MR SPINE LUMBAR                         EXAM:  MR SPINE THORACIC                        PROCEDURE DATE:  2021    IMPRESSION:  Multilevel small thoracic spine disc herniations with cord impingement. No cord signal abnormality.  Chronic L2 superior anterior corner fracture.  Probable nondisplaced sacrococcygeal fracture with presacral edema. Dedicated bony pelvis MRI CT is recommended.  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 Nonemergent postcontrast images are recommended.  < end of copied text >   INTERVAL HPI/OVERNIGHT EVENTS:  Pt is a 71yf presented to the ed 4 days prior and then was called to return after radiology reviewed her films.  Pt has a hx of chronic back pain and this pain has exacerbated with the fall.  Pt states the pain is chronic at this time she is complaining of pain of the lower spine and sacral region. Pain increased with bending or laying flat.     MEDICATIONS  (STANDING):    MEDICATIONS  (PRN):  acetaminophen   Tablet .. 650 milliGRAM(s) Oral every 6 hours PRN Mild Pain (1 - 3)  diazepam    Tablet 5 milliGRAM(s) Oral every 12 hours PRN pain/ prior to MRI  gabapentin 300 milliGRAM(s) Oral every 6 hours PRN pain  ibuprofen  Tablet. 600 milliGRAM(s) Oral every 6 hours PRN Mild Pain (1 - 3)  oxycodone    5 mG/acetaminophen 325 mG 1 Tablet(s) Oral every 4 hours PRN Severe Pain (7 - 10)      Allergies  latex (Hives)  No Known Drug Allergies  Intolerances  aspirin (Stomach Upset)    Vital Signs Last 24 Hrs  T(C): 36.7 (2021 11:43), Max: 37.3 (2021 13:23)  T(F): 98 (2021 11:43), Max: 99.1 (2021 13:23)  HR: 67 (2021 11:43) (64 - 76)  BP: 151/81 (2021 11:43) (142/95 - 182/87)  BP(mean): --  RR: 18 (2021 11:43) (18 - 18)  SpO2: 97% (2021 11:43) (95% - 100%) BMI (kg/m2): 23.1 (21 @ 13:23)      PHYSICAL EXAM  GENERAL: NAD, well-groomed, well-developed  HEAD:  Atraumatic, Normocephalic  EYES: EOMI, PERRLA, conjunctiva and sclera clear  ENMT: No tonsillar erythema, exudates, or enlargement; Moist mucous membranes, Good dentition, No lesions  NECK: Supple, No JVD, Normal thyroid  NERVOUS SYSTEM:  Alert & Oriented X3, Good concentration; Motor Strength 5/5 B/L upper and lower extremities; DTRs 2+ intact and symmetric  LYMPH: No lymphadenopathy noted  SKIN: No rashes or lesions      LABS:              Urinalysis Basic - ( 2021 11:53 )    Color: Yellow / Appearance: Clear / S.020 / pH: x  Gluc: x / Ketone: Negative  / Bili: Negative / Urobili: Negative mg/dL   Blood: x / Protein: 15 mg/dL / Nitrite: Negative   Leuk Esterase: Moderate / RBC: 0-2 /HPF / WBC 11-25   Sq Epi: x / Non Sq Epi: Few / Bacteria: Occasional      I&O's Detail    RADIOLOGY & ADDITIONAL TESTS:  < from: CT Thoracic Spine No Cont (21 @ 17:43) >   EXAM:  CT LUMBAR SPINE                         EXAM:  CT THORACIC SPINE                        PROCEDURE DATE:  2021    IMPRESSION:  Subacute to chronic anterior corner fracture of L2.  Disc protrusions in the mid thoracic levels with potential spinal cord impingement at T7-8.  < end of copied text >    < from: CT Cervical Spine No Cont (21 @ 14:54) >   EXAM:  CT CERVICAL SPINE                        PROCEDURE DATE:  2021    Multilevel degenerative changes. No acute fracture identified.  < end of copied text >    < from: MR Lumbar Spine No Cont (21 @ 10:05) >   EXAM:  MR SPINE LUMBAR                         EXAM:  MR SPINE THORACIC                        PROCEDURE DATE:  2021    IMPRESSION:  Multilevel small thoracic spine disc herniations with cord impingement. No cord signal abnormality.  Chronic L2 superior anterior corner fracture.  Probable nondisplaced sacrococcygeal fracture with presacral edema. Dedicated bony pelvis MRI CT is recommended.  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 Nonemergent postcontrast images are recommended.  < end of copied text >    < from: MR Pelvis Bony Only No Cont (21 @ 09:25) >                   PROCEDURE DATE:  2021    IMPRESSION: Acute H-shaped sacral fracture.  Advanced bilateral hip osteoarthritis. Small to moderate-sized bilateral hip joint effusions with synovitis.  < end of copied text >

## 2021-06-30 NOTE — PROGRESS NOTE ADULT - ASSESSMENT
71YF s/p fall hx of chronic back pain    MR of the lumbar and thoracic spine demonstrates chronic L2 superior anterior fx.  ? nondisplaced sacrococcygeal fracture and a right paravertebral soft tissue lesion at t12 and L1 sheath tumor?    Plan  1. ct of the pelvis needs to be completed to r/o fracture.   2. Pt needs an mri of the l-s spine with contrast to r/o lesion  3. PA in the ED will discuss getting these films while pt remains in the ED          Incomplete note 71YF s/p fall hx of chronic back pain    MR of the lumbar and thoracic spine demonstrates chronic L2 superior anterior fx.  ? nondisplaced sacrococcygeal fracture and a right paravertebral soft tissue lesion at t12 and L1 sheath tumor?    Plan  1. ct of the pelvis needs to be completed to r/o fracture. films appreciated.   2. Pt needs an mri of the l-s spine with contrast to r/o lesion  3. PA in the ED will discuss getting these films while pt remains in the ED. Pt would like to be discharge.   4. Pt will follow as an outpatient with Dr. Hong

## 2021-07-02 LAB
CULTURE RESULTS: SIGNIFICANT CHANGE UP
SPECIMEN SOURCE: SIGNIFICANT CHANGE UP

## 2021-07-13 ENCOUNTER — APPOINTMENT (OUTPATIENT)
Dept: NEUROSURGERY | Facility: CLINIC | Age: 71
End: 2021-07-13
Payer: MEDICARE

## 2021-07-13 VITALS
HEART RATE: 71 BPM | TEMPERATURE: 98.7 F | DIASTOLIC BLOOD PRESSURE: 91 MMHG | BODY MASS INDEX: 22.2 KG/M2 | SYSTOLIC BLOOD PRESSURE: 165 MMHG | OXYGEN SATURATION: 97 % | WEIGHT: 130 LBS | HEIGHT: 64 IN

## 2021-07-13 DIAGNOSIS — S32.10XA UNSPECIFIED FRACTURE OF SACRUM, INITIAL ENCOUNTER FOR CLOSED FRACTURE: ICD-10-CM

## 2021-07-13 DIAGNOSIS — G95.9 DISEASE OF SPINAL CORD, UNSPECIFIED: ICD-10-CM

## 2021-07-13 DIAGNOSIS — S32.029A UNSPECIFIED FRACTURE OF SECOND LUMBAR VERTEBRA, INITIAL ENCOUNTER FOR CLOSED FRACTURE: ICD-10-CM

## 2021-07-13 PROCEDURE — 99072 ADDL SUPL MATRL&STAF TM PHE: CPT

## 2021-07-13 PROCEDURE — 99213 OFFICE O/P EST LOW 20 MIN: CPT

## 2021-07-13 RX ORDER — IBUPROFEN 600 MG/1
600 TABLET, FILM COATED ORAL
Refills: 0 | Status: ACTIVE | COMMUNITY

## 2021-07-13 RX ORDER — OXYCODONE AND ACETAMINOPHEN 5; 325 MG/1; MG/1
5-325 TABLET ORAL
Qty: 20 | Refills: 0 | Status: ACTIVE | COMMUNITY
Start: 2021-07-13 | End: 1900-01-01

## 2021-07-13 NOTE — DATA REVIEWED
[de-identified] : EXAM: MR SPINE LUMBAR\par  EXAM: MR SPINE THORACIC\par \par PROCEDURE DATE: 06/30/2021\par \par \par \par INTERPRETATION: CLINICAL INDICATIONS: abnl CT, L2 fracture, disc herniations at T7 and T8\par \par COMPARISON: CT lumbar spine 6/29/2021\par \par TECHNIQUE: Thoracic and lumbar spine MRI: Multiplanar, multisequence MR images of the thoracic and lumbar spine without the administration of intravenous gadolinium.\par \par FINDINGS:\par \par THORACIC SPINE MRI:\par Small left of midline disc herniation at T7/T8 causing mild cord impingement without cord signal abnormality. Mild multilevel discogenic degenerative changes. Tiny right of midline disc herniation at T2/T3 on image 8 of series 9 with mild cord impingement. No cord signal abnormality. Tiny disc protrusion at T6/T7 level. No bone marrow edema and the thoracic spine.\par \par LUMBAR SPINE MRI:\par Chronic L2 superior endplate corner fracture. No associated edema or soft tissue swelling. Mild chronic height loss involves the L2 and L3 superior endplates. Small Schmorl's node along the inferior endplate of L4. Moderate disc desiccation at L4/L5. Conus medullaris ends at the T12/L1 level.\par \par Moderate presacral edema on image 8 of series 5. Moderate bone marrow edema involving the S2-S3 and S4 vertebral bodies and image 8 of series 5 suggesting fracture. Consider CT or MRI of the bony pelvis.\par \par Tiny Tarlov cyst at the S2 level.\par \par Right T12/L1 paravertebral lesion measures 2.3 x 1.2 cm on image 3 of series 7, suggesting nerve sheath tumor. Consider postcontrast images.\par \par Evaluation of the individual levels:\par L1/L2 level: No disc herniation, spinal canal stenosis, or foraminal narrowing.\par L2/L3 level: 5 mm right perineural root sleeve cyst. No disc herniation, spinal canal stenosis, or foraminal narrowing.\par L3/L4 level: Tiny bilateral perineural root sleeve cyst. Mild disc bulge. Mild spinal canal stenosis. No foraminal narrowing.\par L4/L5 level: Broad-based disc herniation. Mild spinal canal stenosis. No foraminal narrowing.\par L5/S1 level: Shallow broad-based disc herniation. No spinal canal stenosis or foraminal narrowing.\par \par Left thyroid lobe as visualized. Right thyroid lobe appears mildly enlarged. Nonemergent thyroid ultrasound is recommended. Small hemangioma in the left iliac wing on image 30 of series 6.\par \par IMPRESSION:\par Multilevel small thoracic spine disc herniations with cord impingement. No cord signal abnormality.\par \par Chronic L2 superior anterior corner fracture.\par \par Probable nondisplaced sacrococcygeal fracture with presacral edema. Dedicated bony pelvis MRI CT is recommended.\par \par Right paravertebral soft tissue lesion at the level of T12/L1 suggests nerve sheath tumor. Nonemergent postcontrast images are recommended.\par \par

## 2021-07-13 NOTE — HISTORY OF PRESENT ILLNESS
[< 3 months] : less than 3 months [FreeTextEntry1] : lower back and right side pain into buttocks [de-identified] : ALTAGRACIA BUTLER is a 71 year old female who presents for neurosurgical evaluation of lower back pain that radiates into the right side of her buttocks with right sided numbness/tingling. She states that she slipped on water in the kitchen, hit her head with no LOC. MRI showed subacute to chronic anterior corner fracture of L2. Disc protrusions in the mid thoracic levels with potential spinal cord impingement at T7-8, and a lesion found at T12- L1 level. She states she has had ongoing chronic back pain that was exacerbated with this fall. The pain is currently at the lower back and sacral area that radiates pain to right buttock to right lateral thigh and she describes the pain as sharp, and burning. She has been using her husbands walker or cane to walk because of the limp the pain is causing her. She admits to chronic urinary incontinence which she has a pessary.  She denies weakness, trauma, or new falls. \par \par \par \par

## 2021-07-13 NOTE — REASON FOR VISIT
[New Patient Visit] : a new patient visit [Referred By: _________] : Patient was referred by ELLIOTT [Spouse] : spouse [FreeTextEntry1] : back pain

## 2021-07-13 NOTE — REVIEW OF SYSTEMS
[Feeling Tired] : feeling tired [As Noted in HPI] : as noted in HPI [Anxiety] : anxiety [Joint Pain] : joint pain [Limb Pain] : limb pain [Negative] : Heme/Lymph

## 2021-07-13 NOTE — PHYSICAL EXAM
[General Appearance - Alert] : alert [General Appearance - In No Acute Distress] : in no acute distress [General Appearance - Well Nourished] : well nourished [Oriented To Time, Place, And Person] : oriented to person, place, and time [Impaired Insight] : insight and judgment were intact [Affect] : the affect was normal [Person] : oriented to person [Place] : oriented to place [Time] : oriented to time [Motor Strength] : muscle strength was normal in all four extremities [Sensation Tactile Decrease] : light touch was intact [Sensation Pain / Temperature Decrease] : pain and temperature was intact [Limited Balance] : the patient's balance was impaired [No Tenderness to Palpation] : no spine tenderness on palpation [No Visual Abnormalities] : no visible abnormailities [Antalgic] : antalgic [Able to toe walk] : the patient was able to toe walk [Able to heel walk] : the patient was able to heel walk [Sclera] : the sclera and conjunctiva were normal [PERRL With Normal Accommodation] : pupils were equal in size, round, reactive to light, with normal accommodation [Neck Appearance] : the appearance of the neck was normal [] : no respiratory distress [No Spinal Tenderness] : no spinal tenderness [Involuntary Movements] : no involuntary movements were seen [Motor Tone] : muscle strength and tone were normal [FreeTextEntry8] : antalgic gait [FreeTextEntry1] : careful gait

## 2021-07-13 NOTE — ASSESSMENT
[FreeTextEntry1] : Patient with above history and imaging. No neurological deficits. Antalgic gait with stooped posture because of pain. Advised she should use walker for support and balance. Order for lumbosacral corset to help with pain and give support. MRI lumbar spine w/wo to assess lesion. \par \par Plan:\par MRI lumbar spine w/wo\par Referral for lumbosacral corset given\par f/u after imaging\par Patient knows to call the office if there are any new or worsening symptoms. \par All questions and concerns answered and addressed in detail to patient's complete satisfaction. Patient verbalized understanding and agreed to plan.\par \par

## 2021-08-02 ENCOUNTER — OUTPATIENT (OUTPATIENT)
Dept: OUTPATIENT SERVICES | Facility: HOSPITAL | Age: 71
LOS: 1 days | End: 2021-08-02

## 2021-08-02 ENCOUNTER — APPOINTMENT (OUTPATIENT)
Dept: MRI IMAGING | Facility: CLINIC | Age: 71
End: 2021-08-02
Payer: MEDICARE

## 2021-08-02 DIAGNOSIS — Z98.890 OTHER SPECIFIED POSTPROCEDURAL STATES: Chronic | ICD-10-CM

## 2021-08-02 DIAGNOSIS — G95.9 DISEASE OF SPINAL CORD, UNSPECIFIED: ICD-10-CM

## 2021-08-02 DIAGNOSIS — Z90.89 ACQUIRED ABSENCE OF OTHER ORGANS: Chronic | ICD-10-CM

## 2021-08-02 DIAGNOSIS — Z96.7 PRESENCE OF OTHER BONE AND TENDON IMPLANTS: Chronic | ICD-10-CM

## 2021-08-02 DIAGNOSIS — Z90.13 ACQUIRED ABSENCE OF BILATERAL BREASTS AND NIPPLES: Chronic | ICD-10-CM

## 2021-08-02 PROCEDURE — 72158 MRI LUMBAR SPINE W/O & W/DYE: CPT | Mod: 26

## 2021-08-06 ENCOUNTER — NON-APPOINTMENT (OUTPATIENT)
Age: 71
End: 2021-08-06

## 2021-08-13 ENCOUNTER — APPOINTMENT (OUTPATIENT)
Dept: NEUROSURGERY | Facility: CLINIC | Age: 71
End: 2021-08-13

## 2021-08-15 ENCOUNTER — NON-APPOINTMENT (OUTPATIENT)
Age: 71
End: 2021-08-15

## 2021-08-16 ENCOUNTER — APPOINTMENT (OUTPATIENT)
Dept: THORACIC SURGERY | Facility: CLINIC | Age: 71
End: 2021-08-16
Payer: MEDICARE

## 2021-08-16 VITALS
HEIGHT: 64 IN | BODY MASS INDEX: 22.2 KG/M2 | DIASTOLIC BLOOD PRESSURE: 85 MMHG | TEMPERATURE: 98.9 F | OXYGEN SATURATION: 98 % | HEART RATE: 85 BPM | RESPIRATION RATE: 18 BRPM | SYSTOLIC BLOOD PRESSURE: 136 MMHG | WEIGHT: 130 LBS

## 2021-08-16 DIAGNOSIS — Z87.42 PERSONAL HISTORY OF OTHER DISEASES OF THE FEMALE GENITAL TRACT: ICD-10-CM

## 2021-08-16 PROCEDURE — 99214 OFFICE O/P EST MOD 30 MIN: CPT

## 2021-08-16 RX ORDER — METHOCARBAMOL 750 MG/1
TABLET, FILM COATED ORAL
Refills: 0 | Status: COMPLETED | COMMUNITY
End: 2021-08-16

## 2021-08-16 RX ORDER — METHOCARBAMOL 500 MG/1
500 TABLET, FILM COATED ORAL
Qty: 30 | Refills: 0 | Status: COMPLETED | COMMUNITY
Start: 2021-07-13 | End: 2021-08-16

## 2021-08-16 NOTE — HISTORY OF PRESENT ILLNESS
[FreeTextEntry1] : Ms. BUTLER is a 71 year old former smoker  ( 1/2 PPD x 20 years, quit 1998) who was  referred by Dr. Ji Hong  for a initial consultation  on  a abnormal  MRI L Spine s/p fall on 6.26.21. This showed a  enhancing oval soft tissue mass (measures  approximately 2.4 x 1.0 x 1.4 cm) and demonstrates homogeneous enhancement.This is again seen along the inferior margin of the right 12th rib at the costovertebral junction, abutting the pleura, and without T12-L1 foraminal extension. Her past medical history includes anxiety,ductal carcinoma in situ (DCIS) of left breast(History of chemo/radiation in 1998  and in  2018 a  bilateral mastectomy with Dr. Garrett Patricio for a recurrence),IBS ,L2 vertebral fracture,lumbar radiculopathy,postmenopausal atrophic vaginitis, prolapse of female pelvic organs,sacral fracture,spinal cord lesion,UI (urinary incontinence), malignant neoplasm of right breast, osteoporosis, ovarian cyst,thyroid disease, urinary tract infection, uterine leiomyoma,and shingles (herpes zoster) polyneuropathy. \par \par Today she reports low back pain radiating  to the right leg from a lumbar and sacral  fracture. She uses muscle relaxants and neuropathic medication to manage her symptoms. She denies  dizziness, shortness of breath with climbing up/down stairs or with exertion, unintentional weight loss, cough, fever or chills.  She is here to discuss interventional management. \par

## 2021-08-16 NOTE — CONSULT LETTER
[Dear  ___] : Dear  [unfilled], [Courtesy Letter:] : I had the pleasure of seeing your patient, [unfilled], in my office today. [Please see my note below.] : Please see my note below. [Consult Closing:] : Thank you very much for allowing me to participate in the care of this patient.  If you have any questions, please do not hesitate to contact me. [Sincerely,] : Sincerely, [FreeTextEntry2] : Dr. Ji Hong [FreeTextEntry3] : Arvin Interiano MD\par Department of Cardiovascular and Thoracic Surgery\par \par [employer]\par [facility] at [facility]

## 2021-08-16 NOTE — DATA REVIEWED
[FreeTextEntry1] : 8.2.21 MRI  L SPINE  from , [facility] \par SOFT TISSUES\par Small presumed renal cysts are noted.\par Oval soft tissue lesion is again seen along the inferior margin of the right 12th rib at the costovertebral junction, abutting the pleura, and without T12-L1 foraminal extension. This measures approximately 2.4 x 1.0 x 1.4 cm and demonstrates homogeneous enhancement.\par \par IMPRESSION:\par 1. Enhancing oval soft tissue mass is again seen along the inferior margin of the right 12th rib at the costovertebral junction, abutting the pleura, and without T12-L1 foraminal extension. This appears stable and a peripheral nerve sheath tumor may be considered as well as other processes including metastatic disease. Correlation with older imaging is suggested if available to assess stability. Consider PET CT or CT chest abdomen and pelvis for further evaluation or follow-up MRI to assess stability.\par 2. Presumed progression of the sacral insufficiency fractures with diffuse marrow edema and marrow replacement of the right sacral ala. Superimposed metastatic disease could not be absolutely excluded. CT may be helpful for further characterization or follow-up MRI to assess stability and to assess healing.\par \par --- End of Report ---\par \par 6.30.21 MRI L Spine and T Spine from [facility] \par -Multilevel small thoracic spine disc herniations with cord impingement. No cord signal abnormality.\par -Chronic L2 superior anterior corner fracture.\par -Probable nondisplaced sacrococcygeal fracture with presacral edema. Dedicated bony pelvis MRI CT is recommended.\par -Right paravertebral soft tissue lesion at the level of T12/L1 suggests nerve sheath tumor. Nonemergent postcontrast images are recommended.\par \par \par

## 2021-08-16 NOTE — ASSESSMENT
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 She does have a history of recurrent breast cancer and the possibility of metastatic disease has also a resident. I will be arranging a transthoracic needle biopsy in the near future for tissue diagnosis to guide management.\par \par Thank you for allowing me to participate in the care of your patient.\par \par 45 minutes was spent during this encounter.\par \par Arvin Interiano MD\par Department of Cardiovascular and Thoracic Surgery\par \par [employer]\par [employer] [facility] at [facility]\par

## 2021-09-03 ENCOUNTER — OUTPATIENT (OUTPATIENT)
Dept: OUTPATIENT SERVICES | Facility: HOSPITAL | Age: 71
LOS: 1 days | End: 2021-09-03
Payer: MEDICARE

## 2021-09-03 VITALS
DIASTOLIC BLOOD PRESSURE: 80 MMHG | SYSTOLIC BLOOD PRESSURE: 115 MMHG | RESPIRATION RATE: 18 BRPM | HEIGHT: 64 IN | WEIGHT: 130.29 LBS | HEART RATE: 77 BPM | TEMPERATURE: 98 F

## 2021-09-03 DIAGNOSIS — Z90.13 ACQUIRED ABSENCE OF BILATERAL BREASTS AND NIPPLES: Chronic | ICD-10-CM

## 2021-09-03 DIAGNOSIS — Z98.890 OTHER SPECIFIED POSTPROCEDURAL STATES: Chronic | ICD-10-CM

## 2021-09-03 DIAGNOSIS — R22.2 LOCALIZED SWELLING, MASS AND LUMP, TRUNK: ICD-10-CM

## 2021-09-03 DIAGNOSIS — Z90.89 ACQUIRED ABSENCE OF OTHER ORGANS: Chronic | ICD-10-CM

## 2021-09-03 DIAGNOSIS — C50.912 MALIGNANT NEOPLASM OF UNSPECIFIED SITE OF LEFT FEMALE BREAST: ICD-10-CM

## 2021-09-03 DIAGNOSIS — Z29.9 ENCOUNTER FOR PROPHYLACTIC MEASURES, UNSPECIFIED: ICD-10-CM

## 2021-09-03 DIAGNOSIS — Z01.818 ENCOUNTER FOR OTHER PREPROCEDURAL EXAMINATION: ICD-10-CM

## 2021-09-03 DIAGNOSIS — Z96.7 PRESENCE OF OTHER BONE AND TENDON IMPLANTS: Chronic | ICD-10-CM

## 2021-09-03 LAB
ALBUMIN SERPL ELPH-MCNC: 4.5 G/DL — SIGNIFICANT CHANGE UP (ref 3.3–5.2)
ALP SERPL-CCNC: 78 U/L — SIGNIFICANT CHANGE UP (ref 40–120)
ALT FLD-CCNC: 15 U/L — SIGNIFICANT CHANGE UP
ANION GAP SERPL CALC-SCNC: 13 MMOL/L — SIGNIFICANT CHANGE UP (ref 5–17)
APPEARANCE UR: CLEAR — SIGNIFICANT CHANGE UP
APTT BLD: 29.3 SEC — SIGNIFICANT CHANGE UP (ref 27.5–35.5)
AST SERPL-CCNC: 18 U/L — SIGNIFICANT CHANGE UP
BACTERIA # UR AUTO: ABNORMAL
BASOPHILS # BLD AUTO: 0.03 K/UL — SIGNIFICANT CHANGE UP (ref 0–0.2)
BASOPHILS NFR BLD AUTO: 0.5 % — SIGNIFICANT CHANGE UP (ref 0–2)
BILIRUB SERPL-MCNC: 0.5 MG/DL — SIGNIFICANT CHANGE UP (ref 0.4–2)
BILIRUB UR-MCNC: NEGATIVE — SIGNIFICANT CHANGE UP
BUN SERPL-MCNC: 25.9 MG/DL — HIGH (ref 8–20)
CALCIUM SERPL-MCNC: 9.7 MG/DL — SIGNIFICANT CHANGE UP (ref 8.6–10.2)
CHLORIDE SERPL-SCNC: 102 MMOL/L — SIGNIFICANT CHANGE UP (ref 98–107)
CO2 SERPL-SCNC: 23 MMOL/L — SIGNIFICANT CHANGE UP (ref 22–29)
COLOR SPEC: YELLOW — SIGNIFICANT CHANGE UP
CREAT SERPL-MCNC: 0.49 MG/DL — LOW (ref 0.5–1.3)
DIFF PNL FLD: ABNORMAL
EOSINOPHIL # BLD AUTO: 0.04 K/UL — SIGNIFICANT CHANGE UP (ref 0–0.5)
EOSINOPHIL NFR BLD AUTO: 0.6 % — SIGNIFICANT CHANGE UP (ref 0–6)
EPI CELLS # UR: SIGNIFICANT CHANGE UP
GLUCOSE SERPL-MCNC: 95 MG/DL — SIGNIFICANT CHANGE UP (ref 70–99)
GLUCOSE UR QL: NEGATIVE MG/DL — SIGNIFICANT CHANGE UP
HCT VFR BLD CALC: 39.6 % — SIGNIFICANT CHANGE UP (ref 34.5–45)
HGB BLD-MCNC: 12.9 G/DL — SIGNIFICANT CHANGE UP (ref 11.5–15.5)
IMM GRANULOCYTES NFR BLD AUTO: 0.2 % — SIGNIFICANT CHANGE UP (ref 0–1.5)
INR BLD: 1.08 RATIO — SIGNIFICANT CHANGE UP (ref 0.88–1.16)
KETONES UR-MCNC: NEGATIVE — SIGNIFICANT CHANGE UP
LEUKOCYTE ESTERASE UR-ACNC: ABNORMAL
LYMPHOCYTES # BLD AUTO: 1.66 K/UL — SIGNIFICANT CHANGE UP (ref 1–3.3)
LYMPHOCYTES # BLD AUTO: 25.5 % — SIGNIFICANT CHANGE UP (ref 13–44)
MCHC RBC-ENTMCNC: 30.1 PG — SIGNIFICANT CHANGE UP (ref 27–34)
MCHC RBC-ENTMCNC: 32.6 GM/DL — SIGNIFICANT CHANGE UP (ref 32–36)
MCV RBC AUTO: 92.3 FL — SIGNIFICANT CHANGE UP (ref 80–100)
MONOCYTES # BLD AUTO: 0.5 K/UL — SIGNIFICANT CHANGE UP (ref 0–0.9)
MONOCYTES NFR BLD AUTO: 7.7 % — SIGNIFICANT CHANGE UP (ref 2–14)
NEUTROPHILS # BLD AUTO: 4.26 K/UL — SIGNIFICANT CHANGE UP (ref 1.8–7.4)
NEUTROPHILS NFR BLD AUTO: 65.5 % — SIGNIFICANT CHANGE UP (ref 43–77)
NITRITE UR-MCNC: NEGATIVE — SIGNIFICANT CHANGE UP
PH UR: 6 — SIGNIFICANT CHANGE UP (ref 5–8)
PLATELET # BLD AUTO: 214 K/UL — SIGNIFICANT CHANGE UP (ref 150–400)
POTASSIUM SERPL-MCNC: 4.6 MMOL/L — SIGNIFICANT CHANGE UP (ref 3.5–5.3)
POTASSIUM SERPL-SCNC: 4.6 MMOL/L — SIGNIFICANT CHANGE UP (ref 3.5–5.3)
PROT SERPL-MCNC: 7.7 G/DL — SIGNIFICANT CHANGE UP (ref 6.6–8.7)
PROT UR-MCNC: NEGATIVE MG/DL — SIGNIFICANT CHANGE UP
PROTHROM AB SERPL-ACNC: 12.5 SEC — SIGNIFICANT CHANGE UP (ref 10.6–13.6)
RBC # BLD: 4.29 M/UL — SIGNIFICANT CHANGE UP (ref 3.8–5.2)
RBC # FLD: 13.5 % — SIGNIFICANT CHANGE UP (ref 10.3–14.5)
RBC CASTS # UR COMP ASSIST: SIGNIFICANT CHANGE UP /HPF (ref 0–4)
SODIUM SERPL-SCNC: 137 MMOL/L — SIGNIFICANT CHANGE UP (ref 135–145)
SP GR SPEC: 1.01 — SIGNIFICANT CHANGE UP (ref 1.01–1.02)
UROBILINOGEN FLD QL: NEGATIVE MG/DL — SIGNIFICANT CHANGE UP
WBC # BLD: 6.5 K/UL — SIGNIFICANT CHANGE UP (ref 3.8–10.5)
WBC # FLD AUTO: 6.5 K/UL — SIGNIFICANT CHANGE UP (ref 3.8–10.5)
WBC UR QL: SIGNIFICANT CHANGE UP

## 2021-09-03 PROCEDURE — 93005 ELECTROCARDIOGRAM TRACING: CPT

## 2021-09-03 PROCEDURE — G0463: CPT

## 2021-09-03 PROCEDURE — 93010 ELECTROCARDIOGRAM REPORT: CPT

## 2021-09-03 RX ORDER — ALPRAZOLAM 0.25 MG
0 TABLET ORAL
Qty: 0 | Refills: 0 | DISCHARGE

## 2021-09-03 NOTE — H&P PST ADULT - PROBLEM SELECTOR PLAN 1
Pt is scheduled for a ct guided right paraspinal mass biopsy with anesthesia ordered by Dr Interiano with Dr. Davis and Dr. Hough doing the case on 9/14/2021.

## 2021-09-03 NOTE — H&P PST ADULT - HISTORY OF PRESENT ILLNESS
70 yo female presents to PST for screening for a CT guided right paraspinal mass biopsy with anesthesia.  Pt reports she slipped on water in her kitchen and fell on her back and hit her head on the tile floor, she went to the emergency room where they did CT scan of her spine and a mass/tumor was found.  Pt reports she went for an MRI to confirm mass/tumor.  Pt states she is having pain throughout her spine 8/10, she was given percocet from the ED which helped her pain and has no more.  Pt reports she was refereed to Dr Interiano to biopsy Mass/tumor to r/o cancer.  Pt is scheduled for a ct guided right paraspinal mass biopsy with anesthesia ordered by Dr Interiano with Dr. Davis and Dr. Henleying the case on 9/14/2021.

## 2021-09-03 NOTE — H&P PST ADULT - ATTENDING COMMENTS
1st biopsy performed on 9/14/21 revealed findings suggestive of Hodgkin's Lymphoma, but insufficient material for definitive diagnosis. Therefore, pt was scheduled for a repeat biopsy on 10/18/21.

## 2021-09-03 NOTE — H&P PST ADULT - NSANTHOSAYNRD_GEN_A_CORE
No. LORETA screening performed.  STOP BANG Legend: 0-2 = LOW Risk; 3-4 = INTERMEDIATE Risk; 5-8 = HIGH Risk

## 2021-09-03 NOTE — H&P PST ADULT - NSICDXPASTSURGICALHX_GEN_ALL_CORE_FT
PAST SURGICAL HISTORY:  H/O bilateral mastectomy with alloderm 9/ 2018    H/O ovarian cystectomy     H/O partial thyroidectomy     H/O rhinoplasty     History of tonsillectomy     S/P breast lumpectomy right 1998    S/P ORIF (open reduction internal fixation) fracture right wrist 07/2017

## 2021-09-03 NOTE — H&P PST ADULT - NSICDXFAMILYHX_GEN_ALL_CORE_FT
FAMILY HISTORY:  Father  Still living? Unknown  Family history of bladder cancer, Age at diagnosis: Age Unknown    Mother  Still living? Unknown  Family history of lung cancer, Age at diagnosis: Age Unknown    Sibling  Still living? Unknown  Family history of myelofibrosis, Age at diagnosis: Age Unknown

## 2021-09-03 NOTE — H&P PST ADULT - ASSESSMENT
70 yo female presents to PST for screening for a CT guided right paraspinal mass biopsy with anesthesia.  Pt reports she slipped on water in her kitchen and fell on her back and hit her head on the tile floor, she went to the emergency room where they did CT scan of her spine and a mass/tumor was found.  Pt reports she went for an MRI to confirm mass/tumor.  Pt states she is having pain throughout her spine 8/10, she was given percocet from the ED which helped her pain and has no more.  Pt reports she was refereed to Dr Interiano to biopsy Mass/tumor to r/o cancer.  Pt is scheduled for a ct guided right paraspinal mass biopsy with anesthesia ordered by Dr Interiano with Dr. Davis and Dr. Hough doing the case on 2021.    Patient was educated on preoperative preperation with written and verbal instruction . Patient is going for medical clearance with Dr. Yañez  patient will review medications with PCP. Patient was educated on aspirin and aspirin products NSAIDS ,vitamins and herbals that increase the risk of bleeding and need to be stopped five days before procedure  . Patient was also educated on covid testing and covid prevention ,social distancing and wearing a mask.      Pt is going for COVID screening on 2021    OPIOID RISK TOOL    MYKE EACH BOX THAT APPLIES AND ADD TOTALS AT THE END    FAMILY HISTORY OF SUBSTANCE ABUSE                 FEMALE         MALE                                                Alcohol                             [  ]1 pt          [  ]3pts                                               Illegal Durgs                     [  ]2 pts        [  ]3pts                                               Rx Drugs                           [  ]4 pts        [  ]4 pts    PERSONAL HISTORY OF SUBSTANCE ABUSE                                                                                          Alcohol                             [  ]3 pts       [  ]3 pts                                               Illegal Drugs                     [  ]4 pts        [  ]4 pts                                               Rx Drugs                           [  ]5 pts        [  ]5 pts    AGE BETWEEN 16-45 YEARS                                      [  ]1 pt         [  ]1 pt    HISTORY OF PREADOLESCENT   SEXUAL ABUSE                                                             [  ]3 pts        [  ]0pts    PSYCHOLOGICAL DISEASE                     ADD, OCD, Bipolar, Schizophrenia        [  ]2 pts         [  ]2 pts                      Depression                                               [  ]1 pt           [  ]1 pt           SCORING TOTAL   (add numbers and type here)              (0)                                     CAPRINI SCORE [CLOT]    AGE RELATED RISK FACTORS                                                       MOBILITY RELATED FACTORS  [ ] Age 41-60 years                                            (1 Point)                  [ ] Bed rest                                                        (1 Point)  [x ] Age: 61-74 years                                           (2 Points)                 [ ] Plaster cast                                                   (2 Points)  [ ] Age= 75 years                                              (3 Points)                 [ ] Bed bound for more than 72 hours                 (2 Points)    DISEASE RELATED RISK FACTORS                                               GENDER SPECIFIC FACTORS  [ ] Edema in the lower extremities                       (1 Point)                  [ ] Pregnancy                                                     (1 Point)  [ ] Varicose veins                                               (1 Point)                  [ ] Post-partum < 6 weeks                                   (1 Point)             [ ] BMI > 25 Kg/m2                                            (1 Point)                  [ ] Hormonal therapy  or oral contraception          (1 Point)                 [ ] Sepsis (in the previous month)                        (1 Point)                  [ ] History of pregnancy complications                 (1 point)  [ ] Pneumonia or serious lung disease                                               [ ] Unexplained or recurrent                     (1 Point)           (in the previous month)                               (1 Point)  [ ] Abnormal pulmonary function test                     (1 Point)                 SURGERY RELATED RISK FACTORS  [ ] Acute myocardial infarction                              (1 Point)                 [ ]  Section                                             (1 Point)  [ ] Congestive heart failure (in the previous month)  (1 Point)               [ x] Minor surgery                                                  (1 Point)   [ ] Inflammatory bowel disease                             (1 Point)                 [ ] Arthroscopic surgery                                        (2 Points)  [ ] Central venous access                                      (2 Points)                [ ] General surgery lasting more than 45 minutes   (2 Points)       [ ] Stroke (in the previous month)                          (5 Points)               [ ] Elective arthroplasty                                         (5 Points)                                                                                                                                               HEMATOLOGY RELATED FACTORS                                                 TRAUMA RELATED RISK FACTORS  [ ] Prior episodes of VTE                                     (3 Points)                [ ] Fracture of the hip, pelvis, or leg                       (5 Points)  [ ] Positive family history for VTE                         (3 Points)                 [ ] Acute spinal cord injury (in the previous month)  (5 Points)  [ ] Prothrombin 96366 A                                     (3 Points)                 [ ] Paralysis  (less than 1 month)                             (5 Points)  [ ] Factor V Leiden                                             (3 Points)                  [ ] Multiple Trauma within 1 month                        (5 Points)  [ ] Lupus anticoagulants                                     (3 Points)                                                           [ ] Anticardiolipin antibodies                               (3 Points)                                                       [ ] High homocysteine in the blood                      (3 Points)                                             [ ] Other congenital or acquired thrombophilia      (3 Points)                                                [ ] Heparin induced thrombocytopenia                  (3 Points)                                          Total Score [        3  ]

## 2021-09-03 NOTE — H&P PST ADULT - NSICDXPASTMEDICALHX_GEN_ALL_CORE_FT
PAST MEDICAL HISTORY:  Anxiety     Bladder prolapse, female, acquired     Breast cancer b/l DCIS    Breast cancer in female right - 1998 s/p chemo and radiation    GERD (gastroesophageal reflux disease)     Hiatal hernia     IBS (irritable bowel syndrome)     Osteoarthritis     Osteoporosis     Rheumatoid arthritis     Shingles 2015    Spinal stenosis     Thyroid nodule benign - removed    Urinary incontinence     Uterine prolapse Pessary    UTI (urinary tract infection)

## 2021-09-05 LAB
CULTURE RESULTS: SIGNIFICANT CHANGE UP
SPECIMEN SOURCE: SIGNIFICANT CHANGE UP

## 2021-09-10 DIAGNOSIS — Z01.818 ENCOUNTER FOR OTHER PREPROCEDURAL EXAMINATION: ICD-10-CM

## 2021-09-11 ENCOUNTER — APPOINTMENT (OUTPATIENT)
Dept: DISASTER EMERGENCY | Facility: CLINIC | Age: 71
End: 2021-09-11

## 2021-09-12 LAB — SARS-COV-2 N GENE NPH QL NAA+PROBE: NOT DETECTED

## 2021-09-14 ENCOUNTER — RESULT REVIEW (OUTPATIENT)
Age: 71
End: 2021-09-14

## 2021-09-14 ENCOUNTER — OUTPATIENT (OUTPATIENT)
Dept: OUTPATIENT SERVICES | Facility: HOSPITAL | Age: 71
LOS: 1 days | End: 2021-09-14
Payer: MEDICARE

## 2021-09-14 VITALS
DIASTOLIC BLOOD PRESSURE: 63 MMHG | SYSTOLIC BLOOD PRESSURE: 155 MMHG | HEART RATE: 65 BPM | OXYGEN SATURATION: 97 % | RESPIRATION RATE: 16 BRPM

## 2021-09-14 VITALS
HEART RATE: 77 BPM | DIASTOLIC BLOOD PRESSURE: 54 MMHG | OXYGEN SATURATION: 96 % | SYSTOLIC BLOOD PRESSURE: 100 MMHG | RESPIRATION RATE: 16 BRPM

## 2021-09-14 VITALS
RESPIRATION RATE: 18 BRPM | SYSTOLIC BLOOD PRESSURE: 155 MMHG | DIASTOLIC BLOOD PRESSURE: 74 MMHG | HEIGHT: 64 IN | TEMPERATURE: 96 F | OXYGEN SATURATION: 100 % | HEART RATE: 64 BPM | WEIGHT: 130.07 LBS

## 2021-09-14 DIAGNOSIS — R22.2 LOCALIZED SWELLING, MASS AND LUMP, TRUNK: ICD-10-CM

## 2021-09-14 DIAGNOSIS — Z90.13 ACQUIRED ABSENCE OF BILATERAL BREASTS AND NIPPLES: Chronic | ICD-10-CM

## 2021-09-14 DIAGNOSIS — Z98.890 OTHER SPECIFIED POSTPROCEDURAL STATES: Chronic | ICD-10-CM

## 2021-09-14 DIAGNOSIS — Z96.7 PRESENCE OF OTHER BONE AND TENDON IMPLANTS: Chronic | ICD-10-CM

## 2021-09-14 DIAGNOSIS — Z90.89 ACQUIRED ABSENCE OF OTHER ORGANS: Chronic | ICD-10-CM

## 2021-09-14 PROCEDURE — 20206 BIOPSY MUSCLE PERQ NEEDLE: CPT

## 2021-09-14 PROCEDURE — 88307 TISSUE EXAM BY PATHOLOGIST: CPT | Mod: 26

## 2021-09-14 PROCEDURE — 88342 IMHCHEM/IMCYTCHM 1ST ANTB: CPT | Mod: 26,59

## 2021-09-14 PROCEDURE — 88188 FLOWCYTOMETRY/READ 9-15: CPT

## 2021-09-14 PROCEDURE — 88341 IMHCHEM/IMCYTCHM EA ADD ANTB: CPT | Mod: 26,59

## 2021-09-14 PROCEDURE — 77012 CT SCAN FOR NEEDLE BIOPSY: CPT | Mod: 26

## 2021-09-14 NOTE — PROCEDURE NOTE - PROCEDURE FINDINGS AND DETAILS
Small paraspinal mass noted adjacent to the diaphragm, biopsy specimens obtained and given to pathology in formalin and RPMI

## 2021-09-14 NOTE — CHART NOTE - NSCHARTNOTEFT_GEN_A_CORE
Please note ordering physician for pathology is I (Catarino Abdalla) at the request of cardiothoracic surgery Dr Arvin Interiano. Ordering physician listed in sunrise has same last name as me and was put in incorrectly

## 2021-09-16 LAB — TM INTERPRETATION: SIGNIFICANT CHANGE UP

## 2021-09-22 LAB — SURGICAL PATHOLOGY STUDY: SIGNIFICANT CHANGE UP

## 2021-09-22 PROCEDURE — 88184 FLOWCYTOMETRY/ TC 1 MARKER: CPT

## 2021-09-22 PROCEDURE — 88185 FLOWCYTOMETRY/TC ADD-ON: CPT

## 2021-09-22 PROCEDURE — 20225 BONE BIOPSY TROCAR/NDL DEEP: CPT

## 2021-09-22 PROCEDURE — 81340 TRB@ GENE REARRANGE AMPLIFY: CPT

## 2021-09-22 PROCEDURE — 87205 SMEAR GRAM STAIN: CPT

## 2021-09-22 PROCEDURE — 77012 CT SCAN FOR NEEDLE BIOPSY: CPT

## 2021-09-22 PROCEDURE — 81342 TRG GENE REARRANGEMENT ANAL: CPT

## 2021-09-27 ENCOUNTER — APPOINTMENT (OUTPATIENT)
Dept: THORACIC SURGERY | Facility: CLINIC | Age: 71
End: 2021-09-27

## 2021-09-28 LAB — DNA PLOIDY SPEC FC-IMP: SIGNIFICANT CHANGE UP

## 2021-09-30 ENCOUNTER — APPOINTMENT (OUTPATIENT)
Dept: NUCLEAR MEDICINE | Facility: CLINIC | Age: 71
End: 2021-09-30
Payer: MEDICARE

## 2021-09-30 ENCOUNTER — OUTPATIENT (OUTPATIENT)
Dept: OUTPATIENT SERVICES | Facility: HOSPITAL | Age: 71
LOS: 1 days | End: 2021-09-30

## 2021-09-30 ENCOUNTER — RESULT REVIEW (OUTPATIENT)
Age: 71
End: 2021-09-30

## 2021-09-30 DIAGNOSIS — Z90.89 ACQUIRED ABSENCE OF OTHER ORGANS: Chronic | ICD-10-CM

## 2021-09-30 DIAGNOSIS — Z90.13 ACQUIRED ABSENCE OF BILATERAL BREASTS AND NIPPLES: Chronic | ICD-10-CM

## 2021-09-30 DIAGNOSIS — Z96.7 PRESENCE OF OTHER BONE AND TENDON IMPLANTS: Chronic | ICD-10-CM

## 2021-09-30 DIAGNOSIS — Z98.890 OTHER SPECIFIED POSTPROCEDURAL STATES: Chronic | ICD-10-CM

## 2021-09-30 DIAGNOSIS — R22.2 LOCALIZED SWELLING, MASS AND LUMP, TRUNK: ICD-10-CM

## 2021-09-30 PROCEDURE — 78815 PET IMAGE W/CT SKULL-THIGH: CPT | Mod: 26,PI

## 2021-10-04 ENCOUNTER — APPOINTMENT (OUTPATIENT)
Dept: THORACIC SURGERY | Facility: CLINIC | Age: 71
End: 2021-10-04
Payer: MEDICARE

## 2021-10-04 VITALS
HEIGHT: 64 IN | TEMPERATURE: 98.1 F | OXYGEN SATURATION: 100 % | SYSTOLIC BLOOD PRESSURE: 153 MMHG | WEIGHT: 135 LBS | BODY MASS INDEX: 23.05 KG/M2 | HEART RATE: 68 BPM | DIASTOLIC BLOOD PRESSURE: 84 MMHG | RESPIRATION RATE: 18 BRPM

## 2021-10-04 PROCEDURE — 99214 OFFICE O/P EST MOD 30 MIN: CPT

## 2021-10-04 NOTE — CONSULT LETTER
[FreeTextEntry2] : Dr. Ji Hong [FreeTextEntry3] : Arvin Interiano MD\par Department of Cardiovascular and Thoracic Surgery\par \par [employer]\par [employer] [facility] at [facility]

## 2021-10-04 NOTE — ASSESSMENT
[FreeTextEntry1] : Julissa is a 71 year old female with a retroperitoneal nodule that is biopsy proven lymphoma.  I have given her a referral for an oncology evaluation for further recommendations regarding treatment.\par \par Thank you for allowing me to participate in the care of your patient.\par \par 30 minutes was spent during this encounter.\par \par \par Arvin Interiano MD\par Department of Cardiovascular and Thoracic Surgery\par \par [employer]\[facility] [employer] at [facility]\par

## 2021-10-04 NOTE — DATA REVIEWED
[FreeTextEntry1] : 9.30.21 PET/CT from [facility] \par IMPRESSION: FDG-PET/CT scan:\par 1. FDG avid oval soft tissue along the right thoracic paraspinal region, corresponding to the enhancing oval mass on MRI 8/2/2021.\par 2. Linear FDG avidity in the right sacral ala, corresponding to insufficiency fracture, also noted on MRI.\par 3. Minimal stranding adjacent to the anterior surface of the left breast implant, nonspecific. Please correlate clinically.\par \par 8.2.21 MRI  L SPINE  frp, [facility] \par SOFT TISSUES\par Small presumed renal cysts are noted.\par Oval soft tissue lesion is again seen along the inferior margin of the right 12th rib at the costovertebral junction, abutting the pleura, and without T12-L1 foraminal extension. This measures approximately 2.4 x 1.0 x 1.4 cm and demonstrates homogeneous enhancement.\par \par IMPRESSION:\par 1. Enhancing oval soft tissue mass is again seen along the inferior margin of the right 12th rib at the costovertebral junction, abutting the pleura, and without T12-L1 foraminal extension. This appears stable and a peripheral nerve sheath tumor may be considered as well as other processes including metastatic disease. Correlation with older imaging is suggested if available to assess stability. Consider PET CT or CT chest abdomen and pelvis for further evaluation or follow-up MRI to assess stability.\par 2. Presumed progression of the sacral insufficiency fractures with diffuse marrow edema and marrow replacement of the right sacral ala. Superimposed metastatic disease could not be absolutely excluded. CT may be helpful for further characterization or follow-up MRI to assess stability and to assess healing.\par \par --- End of Report ---\par \par 6.30.21 MRI L Spine and T Spine from [facility] \par -Multilevel small thoracic spine disc herniations with cord impingement. No cord signal abnormality.\par -Chronic L2 superior anterior corner fracture.\par -Probable nondisplaced sacrococcygeal fracture with presacral edema. Dedicated bony pelvis MRI CT is recommended.\par -Right paravertebral soft tissue lesion at the level of T12/L1 suggests nerve sheath tumor. Nonemergent postcontrast images are recommended.\par \par \par

## 2021-10-04 NOTE — PHYSICAL EXAM
[] : no respiratory distress [Auscultation Breath Sounds / Voice Sounds] : lungs were clear to auscultation bilaterally [Heart Rate And Rhythm] : heart rate was normal and rhythm regular [Heart Sounds] : normal S1 and S2 [Heart Sounds Gallop] : no gallops [Murmurs] : no murmurs [Heart Sounds Pericardial Friction Rub] : no pericardial rub [Examination Of The Chest] : the chest was normal in appearance [Chest Visual Inspection Thoracic Asymmetry] : no chest asymmetry [Diminished Respiratory Excursion] : normal chest expansion [No Focal Deficits] : no focal deficits [Oriented To Time, Place, And Person] : oriented to person, place, and time [Impaired Insight] : insight and judgment were intact [Affect] : the affect was normal

## 2021-10-04 NOTE — HISTORY OF PRESENT ILLNESS
[FreeTextEntry1] : Ms. BUTLER is a 71 year old former smoker (1/2 PPD x 20 years, quit 1998) who was referred by Dr. Ji Hong for a initial consultation on a abnormal MRI L Spine s/p fall on 6.26.21. This showed a enhancing oval soft tissue mass (measures approximately 2.4 x 1.0 x 1.4 cm) and demonstrates homogeneous enhancement.This is again seen along the inferior margin of the right 12th rib at the costovertebral junction, abutting the pleura, and without T12-L1 foraminal extension. \par \par Today she reports low back pain radiating to the right leg from a lumbar and sacral fracture. 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

## 2021-10-08 ENCOUNTER — OUTPATIENT (OUTPATIENT)
Dept: OUTPATIENT SERVICES | Facility: HOSPITAL | Age: 71
LOS: 1 days | Discharge: HOME | End: 2021-10-08

## 2021-10-08 DIAGNOSIS — Z98.890 OTHER SPECIFIED POSTPROCEDURAL STATES: Chronic | ICD-10-CM

## 2021-10-08 DIAGNOSIS — C50.919 MALIGNANT NEOPLASM OF UNSPECIFIED SITE OF UNSPECIFIED FEMALE BREAST: ICD-10-CM

## 2021-10-08 DIAGNOSIS — Z90.13 ACQUIRED ABSENCE OF BILATERAL BREASTS AND NIPPLES: Chronic | ICD-10-CM

## 2021-10-08 DIAGNOSIS — Z90.89 ACQUIRED ABSENCE OF OTHER ORGANS: Chronic | ICD-10-CM

## 2021-10-08 DIAGNOSIS — Z96.7 PRESENCE OF OTHER BONE AND TENDON IMPLANTS: Chronic | ICD-10-CM

## 2021-10-12 ENCOUNTER — LABORATORY RESULT (OUTPATIENT)
Age: 71
End: 2021-10-12

## 2021-10-12 ENCOUNTER — RESULT REVIEW (OUTPATIENT)
Age: 71
End: 2021-10-12

## 2021-10-12 ENCOUNTER — APPOINTMENT (OUTPATIENT)
Dept: HEMATOLOGY ONCOLOGY | Facility: CLINIC | Age: 71
End: 2021-10-12
Payer: MEDICARE

## 2021-10-12 VITALS
OXYGEN SATURATION: 98 % | HEART RATE: 75 BPM | DIASTOLIC BLOOD PRESSURE: 95 MMHG | BODY MASS INDEX: 22.37 KG/M2 | WEIGHT: 131.02 LBS | HEIGHT: 64 IN | SYSTOLIC BLOOD PRESSURE: 165 MMHG

## 2021-10-12 DIAGNOSIS — R93.89 ABNORMAL FINDINGS ON DIAGNOSTIC IMAGING OF OTHER SPECIFIED BODY STRUCTURES: ICD-10-CM

## 2021-10-12 DIAGNOSIS — R59.1 GENERALIZED ENLARGED LYMPH NODES: ICD-10-CM

## 2021-10-12 LAB
BASOPHILS # BLD AUTO: 0.1 K/UL — SIGNIFICANT CHANGE UP (ref 0–0.2)
BASOPHILS NFR BLD AUTO: 0.7 % — SIGNIFICANT CHANGE UP (ref 0–2)
EOSINOPHIL # BLD AUTO: 0.1 K/UL — SIGNIFICANT CHANGE UP (ref 0–0.5)
EOSINOPHIL NFR BLD AUTO: 0.8 % — SIGNIFICANT CHANGE UP (ref 0–6)
HCT VFR BLD CALC: 42.5 % — SIGNIFICANT CHANGE UP (ref 34.5–45)
HGB BLD-MCNC: 13.8 G/DL — SIGNIFICANT CHANGE UP (ref 11.5–15.5)
LYMPHOCYTES # BLD AUTO: 1.6 K/UL — SIGNIFICANT CHANGE UP (ref 1–3.3)
LYMPHOCYTES # BLD AUTO: 21.6 % — SIGNIFICANT CHANGE UP (ref 13–44)
MCHC RBC-ENTMCNC: 29.7 PG — SIGNIFICANT CHANGE UP (ref 27–34)
MCHC RBC-ENTMCNC: 32.5 G/DL — SIGNIFICANT CHANGE UP (ref 32–36)
MCV RBC AUTO: 91.4 FL — SIGNIFICANT CHANGE UP (ref 80–100)
MONOCYTES # BLD AUTO: 0.5 K/UL — SIGNIFICANT CHANGE UP (ref 0–0.9)
MONOCYTES NFR BLD AUTO: 6.5 % — SIGNIFICANT CHANGE UP (ref 2–14)
NEUTROPHILS # BLD AUTO: 5.3 K/UL — SIGNIFICANT CHANGE UP (ref 1.8–7.4)
NEUTROPHILS NFR BLD AUTO: 70.4 % — SIGNIFICANT CHANGE UP (ref 43–77)
PLATELET # BLD AUTO: 209 K/UL — SIGNIFICANT CHANGE UP (ref 150–400)
RBC # BLD: 4.65 M/UL — SIGNIFICANT CHANGE UP (ref 3.8–5.2)
RBC # FLD: 13.1 % — SIGNIFICANT CHANGE UP (ref 10.3–14.5)
WBC # BLD: 7.6 K/UL — SIGNIFICANT CHANGE UP (ref 3.8–10.5)
WBC # FLD AUTO: 7.6 K/UL — SIGNIFICANT CHANGE UP (ref 3.8–10.5)

## 2021-10-12 PROCEDURE — 99205 OFFICE O/P NEW HI 60 MIN: CPT

## 2021-10-14 LAB
APTT BLD: 32.2 SEC
INR PPP: 1.05 RATIO
LDH SERPL-CCNC: 162 U/L
PT BLD: 12.4 SEC

## 2021-10-15 ENCOUNTER — RESULT REVIEW (OUTPATIENT)
Age: 71
End: 2021-10-15

## 2021-10-15 ENCOUNTER — APPOINTMENT (OUTPATIENT)
Dept: HEMATOLOGY ONCOLOGY | Facility: CLINIC | Age: 71
End: 2021-10-15

## 2021-10-15 ENCOUNTER — LABORATORY RESULT (OUTPATIENT)
Age: 71
End: 2021-10-15

## 2021-10-15 LAB
BASOPHILS # BLD AUTO: 0.1 K/UL — SIGNIFICANT CHANGE UP (ref 0–0.2)
BASOPHILS NFR BLD AUTO: 1.1 % — SIGNIFICANT CHANGE UP (ref 0–2)
EOSINOPHIL # BLD AUTO: 0.1 K/UL — SIGNIFICANT CHANGE UP (ref 0–0.5)
EOSINOPHIL NFR BLD AUTO: 1.6 % — SIGNIFICANT CHANGE UP (ref 0–6)
HCT VFR BLD CALC: 42.4 % — SIGNIFICANT CHANGE UP (ref 34.5–45)
HGB BLD-MCNC: 13.4 G/DL — SIGNIFICANT CHANGE UP (ref 11.5–15.5)
LYMPHOCYTES # BLD AUTO: 1.9 K/UL — SIGNIFICANT CHANGE UP (ref 1–3.3)
LYMPHOCYTES # BLD AUTO: 27.5 % — SIGNIFICANT CHANGE UP (ref 13–44)
MCHC RBC-ENTMCNC: 28.8 PG — SIGNIFICANT CHANGE UP (ref 27–34)
MCHC RBC-ENTMCNC: 31.5 G/DL — LOW (ref 32–36)
MCV RBC AUTO: 91.4 FL — SIGNIFICANT CHANGE UP (ref 80–100)
MONOCYTES # BLD AUTO: 0.7 K/UL — SIGNIFICANT CHANGE UP (ref 0–0.9)
MONOCYTES NFR BLD AUTO: 10.6 % — SIGNIFICANT CHANGE UP (ref 2–14)
NEUTROPHILS # BLD AUTO: 4.1 K/UL — SIGNIFICANT CHANGE UP (ref 1.8–7.4)
NEUTROPHILS NFR BLD AUTO: 59.2 % — SIGNIFICANT CHANGE UP (ref 43–77)
PLATELET # BLD AUTO: 204 K/UL — SIGNIFICANT CHANGE UP (ref 150–400)
RBC # BLD: 4.64 M/UL — SIGNIFICANT CHANGE UP (ref 3.8–5.2)
RBC # FLD: 13 % — SIGNIFICANT CHANGE UP (ref 10.3–14.5)
WBC # BLD: 7 K/UL — SIGNIFICANT CHANGE UP (ref 3.8–10.5)
WBC # FLD AUTO: 7 K/UL — SIGNIFICANT CHANGE UP (ref 3.8–10.5)

## 2021-10-15 NOTE — ADDENDUM
[FreeTextEntry1] : Documented by Arnaldo Aguiar acting as scribe for Dr. Weller on 10/12/2021. \par \par All Medical record entries made by the Scribe were at my, Dr. Weller's, direction and personally dictated by me on 10/12/2021. I have reviewed the chart and agree that the record accurately reflects my personal performance of the history, physical exam, assessment and plan. I have also personally directed, reviewed, and agreed with the discharge instructions.

## 2021-10-15 NOTE — CONSULT LETTER
[Dear  ___] : Dear  [unfilled], [Consult Letter:] : I had the pleasure of evaluating your patient, [unfilled]. [Please see my note below.] : Please see my note below. [Consult Closing:] : Thank you very much for allowing me to participate in the care of this patient.  If you have any questions, please do not hesitate to contact me. [Sincerely,] : Sincerely, [FreeTextEntry3] : Amita Weller MD\par Medical Oncology/Hematology\par [facility] [place], [facility]\par \par \par [facility]\par

## 2021-10-15 NOTE — ASSESSMENT
[FreeTextEntry1] : 71 year old female with remote history of R breast cancer in 1998, and bilateral DCIS s/p b/l mastectomy in 2018, now presenting with Right paravertebral soft tissue lesion at the level of T12/L1 which was discovered on imaging s/p fall in 6/2021.  She is s/p core biopsy by IR on 9/14/21 which shows - suspicious for Hodgkin's lymphoma, favoring nodular lymphocyte predominant, however additional tissue needed for a definitive diagnosis. \par \par I reviewed current imaging and pathology with patient and family. Reviewed PET CT.  Sacral findings appear to be more related to her h/o fracture. \par \par She needs further tissue for definitive diagnosis of the paravertebral mass.  I spoke with Dr. Interiano who recommended another IR biopsy as  the mass is located in a difficult area to reach by surgery.  \par \par \par Plan:\par Biopsy by IR, spoke with Dr. Nancy Davis and Dr. Interiano\par RTO after biopsy\par

## 2021-10-15 NOTE — REASON FOR VISIT
[Initial Consultation] : an initial consultation for [Family Member] : family member [FreeTextEntry2] : Hodgkin's Lymphoma

## 2021-10-18 ENCOUNTER — OUTPATIENT (OUTPATIENT)
Dept: OUTPATIENT SERVICES | Facility: HOSPITAL | Age: 71
LOS: 1 days | End: 2021-10-18
Payer: MEDICARE

## 2021-10-18 ENCOUNTER — RESULT REVIEW (OUTPATIENT)
Age: 71
End: 2021-10-18

## 2021-10-18 VITALS
DIASTOLIC BLOOD PRESSURE: 70 MMHG | SYSTOLIC BLOOD PRESSURE: 130 MMHG | HEART RATE: 67 BPM | TEMPERATURE: 98 F | OXYGEN SATURATION: 98 % | RESPIRATION RATE: 20 BRPM

## 2021-10-18 VITALS
TEMPERATURE: 98 F | DIASTOLIC BLOOD PRESSURE: 88 MMHG | WEIGHT: 130.07 LBS | OXYGEN SATURATION: 100 % | HEIGHT: 64 IN | HEART RATE: 64 BPM | RESPIRATION RATE: 16 BRPM | SYSTOLIC BLOOD PRESSURE: 184 MMHG

## 2021-10-18 DIAGNOSIS — R59.1 GENERALIZED ENLARGED LYMPH NODES: ICD-10-CM

## 2021-10-18 DIAGNOSIS — Z96.7 PRESENCE OF OTHER BONE AND TENDON IMPLANTS: Chronic | ICD-10-CM

## 2021-10-18 DIAGNOSIS — Z98.890 OTHER SPECIFIED POSTPROCEDURAL STATES: Chronic | ICD-10-CM

## 2021-10-18 DIAGNOSIS — Z90.13 ACQUIRED ABSENCE OF BILATERAL BREASTS AND NIPPLES: Chronic | ICD-10-CM

## 2021-10-18 DIAGNOSIS — R93.89 ABNORMAL FINDINGS ON DIAGNOSTIC IMAGING OF OTHER SPECIFIED BODY STRUCTURES: ICD-10-CM

## 2021-10-18 DIAGNOSIS — Z90.89 ACQUIRED ABSENCE OF OTHER ORGANS: Chronic | ICD-10-CM

## 2021-10-18 LAB
ALBUMIN SERPL ELPH-MCNC: 4.8 G/DL
ALP BLD-CCNC: 70 U/L
ALT SERPL-CCNC: 16 U/L
ANION GAP SERPL CALC-SCNC: 13 MMOL/L
AST SERPL-CCNC: 22 U/L
BILIRUB SERPL-MCNC: 0.4 MG/DL
BUN SERPL-MCNC: 26 MG/DL
CALCIUM SERPL-MCNC: 9.9 MG/DL
CHLORIDE SERPL-SCNC: 104 MMOL/L
CO2 SERPL-SCNC: 23 MMOL/L
CREAT SERPL-MCNC: 0.58 MG/DL
GLUCOSE SERPL-MCNC: 86 MG/DL
POTASSIUM SERPL-SCNC: 4.8 MMOL/L
PROT SERPL-MCNC: 7.6 G/DL
SODIUM SERPL-SCNC: 140 MMOL/L

## 2021-10-18 PROCEDURE — 88365 INSITU HYBRIDIZATION (FISH): CPT | Mod: 26

## 2021-10-18 PROCEDURE — 77012 CT SCAN FOR NEEDLE BIOPSY: CPT | Mod: 26

## 2021-10-18 PROCEDURE — 77012 CT SCAN FOR NEEDLE BIOPSY: CPT

## 2021-10-18 PROCEDURE — 88185 FLOWCYTOMETRY/TC ADD-ON: CPT

## 2021-10-18 PROCEDURE — 71045 X-RAY EXAM CHEST 1 VIEW: CPT | Mod: 26

## 2021-10-18 PROCEDURE — 88365 INSITU HYBRIDIZATION (FISH): CPT

## 2021-10-18 PROCEDURE — 88342 IMHCHEM/IMCYTCHM 1ST ANTB: CPT | Mod: 26

## 2021-10-18 PROCEDURE — 20206 BIOPSY MUSCLE PERQ NEEDLE: CPT

## 2021-10-18 PROCEDURE — 88307 TISSUE EXAM BY PATHOLOGIST: CPT

## 2021-10-18 PROCEDURE — 88341 IMHCHEM/IMCYTCHM EA ADD ANTB: CPT | Mod: 26

## 2021-10-18 PROCEDURE — 88184 FLOWCYTOMETRY/ TC 1 MARKER: CPT

## 2021-10-18 PROCEDURE — 88307 TISSUE EXAM BY PATHOLOGIST: CPT | Mod: 26

## 2021-10-18 PROCEDURE — 87205 SMEAR GRAM STAIN: CPT

## 2021-10-18 PROCEDURE — 71045 X-RAY EXAM CHEST 1 VIEW: CPT

## 2021-10-18 PROCEDURE — 88342 IMHCHEM/IMCYTCHM 1ST ANTB: CPT

## 2021-10-18 PROCEDURE — 88341 IMHCHEM/IMCYTCHM EA ADD ANTB: CPT

## 2021-10-18 RX ORDER — ACETAMINOPHEN 500 MG
650 TABLET ORAL EVERY 6 HOURS
Refills: 0 | Status: DISCONTINUED | OUTPATIENT
Start: 2021-10-18 | End: 2021-11-01

## 2021-10-18 NOTE — CHART NOTE - NSCHARTNOTEFT_GEN_A_CORE
Vascular & Interventional Radiology Pre-Procedure Note    Procedure Name: ____repeat CT guided biopsy of right paraspinal mass___    HPI: 71y Female with ____right paraspinal mass___    72 yo female presents to PST for screening for a CT guided right paraspinal mass biopsy with anesthesia.  Pt reports she slipped on water in her kitchen and fell on her back and hit her head on the tile floor, she went to the emergency room where they did CT scan of her spine and a mass/tumor was found.  Pt reports she went for an MRI to confirm mass/tumor.  Pt states she is having pain throughout her spine 8/10, she was given percocet from the ED which helped her pain and has no more.  Pt reports she was refereed to Dr Interiano to biopsy Mass/tumor to r/o cancer. CT biopsy was performed on 9/14/21 and results suggested Hodgkin's disease, but more tissue was requested for definitive diagnosis and further testing. Now for repeat biopsy.    PAST MEDICAL HISTORY:  Anxiety     Bladder prolapse, female, acquired     Breast cancer b/l DCIS    Breast cancer in female right - 1998 s/p chemo and radiation    GERD (gastroesophageal reflux disease)     Hiatal hernia     IBS (irritable bowel syndrome)     Osteoarthritis     Osteoporosis     Rheumatoid arthritis     Shingles 2015    Spinal stenosis     Thyroid nodule benign - removed    Urinary incontinence     Uterine prolapse Pessary    UTI (urinary tract infection).     PAST SURGICAL HISTORY:  H/O bilateral mastectomy with alloderm 9/ 2018    H/O ovarian cystectomy     H/O partial thyroidectomy     H/O rhinoplasty     History of tonsillectomy     S/P breast lumpectomy right 1998    S/P ORIF (open reduction internal fixation) fracture right wrist 07/2017.         Allergies: aspirin (Stomach Upset)  latex (Hives)  No Known Drug Allergies      Medications (Abx/Cardiac/Anticoagulation/Blood Products)  · 	methocarbamol 750 mg oral tablet: Last Dose Taken:  , 1 tab(s) orally 2 times a day  · 	Macrobid 100 mg oral capsule: Last Dose Taken:  , 1 cap(s) orally 2 times a day  · 	gabapentin 300 mg oral capsule: Last Dose Taken:  , 1 cap(s) orally once a day (at bedtime)  · 	Tylenol 500 mg oral tablet: Last Dose Taken:  , 2 tab(s) orally once a day, As Needed  · 	Vitamin C 1000 mg oral tablet: Last Dose Taken:  , 1 tab(s) orally once a day  · 	methenamine hippurate 1 g oral tablet: Last Dose Taken:  , 1 tab(s) orally 2 times a day  · 	Vitamin D3 50 mcg (2000 intl units) oral capsule: Last Dose Taken:  , 1 cap(s) orally once a day  · 	Multiple Vitamins oral tablet, dispersible: Last Dose Taken:  , orally once a day      Data:  162.6  59    T(C): 36.9  HR: 64  BP: 184/88  RR: 16  SpO2: 100%    Exam  General: ___wnl____  Chest: ____wnl___  Abdomen: ____wnl___  Extremities: ___wnl____    -WBC 7.0 / HgB 13.4 / Hct 42.4 / Plt 204  -INR 1.05    Imaging: ___right paraspinal chest wall mass near T12____    Plan:   -71y Female presents for ___CT guided biopsy of a right paraspinal mass____  -Risks/Benefits/alternatives explained with the patient and witnessed informed consent obtained.

## 2021-10-19 LAB — TM INTERPRETATION: SIGNIFICANT CHANGE UP

## 2021-10-27 LAB — SURGICAL PATHOLOGY STUDY: SIGNIFICANT CHANGE UP

## 2021-10-29 ENCOUNTER — OUTPATIENT (OUTPATIENT)
Dept: OUTPATIENT SERVICES | Facility: HOSPITAL | Age: 71
LOS: 1 days | Discharge: HOME | End: 2021-10-29

## 2021-10-29 DIAGNOSIS — Z98.890 OTHER SPECIFIED POSTPROCEDURAL STATES: Chronic | ICD-10-CM

## 2021-10-29 DIAGNOSIS — Z90.89 ACQUIRED ABSENCE OF OTHER ORGANS: Chronic | ICD-10-CM

## 2021-10-29 DIAGNOSIS — Z96.7 PRESENCE OF OTHER BONE AND TENDON IMPLANTS: Chronic | ICD-10-CM

## 2021-10-29 DIAGNOSIS — Z90.13 ACQUIRED ABSENCE OF BILATERAL BREASTS AND NIPPLES: Chronic | ICD-10-CM

## 2021-10-29 DIAGNOSIS — D64.9 ANEMIA, UNSPECIFIED: ICD-10-CM

## 2021-11-01 ENCOUNTER — APPOINTMENT (OUTPATIENT)
Dept: HEMATOLOGY ONCOLOGY | Facility: CLINIC | Age: 71
End: 2021-11-01
Payer: MEDICARE

## 2021-11-01 VITALS
OXYGEN SATURATION: 100 % | WEIGHT: 133 LBS | BODY MASS INDEX: 22.71 KG/M2 | HEIGHT: 64 IN | DIASTOLIC BLOOD PRESSURE: 84 MMHG | SYSTOLIC BLOOD PRESSURE: 134 MMHG | HEART RATE: 75 BPM

## 2021-11-01 PROCEDURE — 99214 OFFICE O/P EST MOD 30 MIN: CPT

## 2021-11-01 NOTE — HISTORY OF PRESENT ILLNESS
[de-identified] : Ms. Wooten is a 70 y/o patient who is being seen for suspicion of Hogdkin's Lymphoma.  \par \par Her medical history is significant for a remote history of stage I ER+ right breast cancer in 1998, s/p lumpectomy, followed by CMF x 6 months and adjuvant RT. She took raloxifene for 5 years. 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 \par She has recurrent UTIs, spinal stenosis, RA.\par \par She is s/p fall in kitchen, 06/26/21. Admitted to hospital, fractured sacrum and fractured L2 (states that L2 fracture might have previously been there).\par 6/30/21 MRI sacrum - Acute H-shaped sacral fracture. Advanced bilateral hip osteoarthritis. Small to moderate-sized bilateral hip joint effusions with synovitis.\par 6/30/21 MRI thoracic spine -Multilevel small thoracic spine disc herniations with cord impingement. No cord signal abnormality.\par Chronic L2 superior anterior corner fracture.\par Probable nondisplaced sacrococcygeal fracture with presacral edema. Dedicated bony pelvis MRI CT is recommended.\par Right paravertebral soft tissue lesion at the level of T12/L1 suggests nerve sheath tumor. Nonemergent postcontrast images are recommended.\par \par She was referred to Dr. Interiano and subsequently had a core needle biopsy of the right paravertebral soft tissue mass at T12/L1 on 9/14/21. \par Pathology - suspicious for Hodgkins' lymphoma, favor nodular lymphocyte predominant. However additional tissue is recommended for further evaluation. \par \par \par PET/CT Skull-Thigh 09/30/21: IMPRESSION: FDG-PET/CT scan:\par 1. FDG avid oval soft tissue along the right thoracic paraspinal region, corresponding to the enhancing oval mass on MRI 8/2/2021.\par 2. Linear FDG avidity in the right sacral ala, corresponding to insufficiency fracture, also noted on MRI.\par 3. Minimal stranding adjacent to the anterior surface of the left breast implant, nonspecific. Please correlate clinically.\par \par T Cell Gene Rearrangement 09/22/21: TCR-beta: Negative, TCR-gamma: Negative\par Interpretation: No discrete bands were identified in either TCR-beta or TCR-gamma, suggesting a polycional T-cell population\par \par Pathology 09/14/21: Final Diagnosis\par 1.  Paraspinal mass, right, core biopsies:\par - Suspicious for Hodgkin Lymphoma.\par  \par MRI Lumbar Spine 08/02/21: IMPRESSION:\par 1. Enhancing oval soft tissue mass is again seen along the inferior margin of the right 12th rib at the costovertebral junction, abutting the pleura, and without T12-L1 foraminal extension. This appears stable and a peripheral nerve sheath tumor may be considered as well as other processes including metastatic disease. Correlation with older imaging is suggested if available to assess stability. Consider PET CT or CT chest abdomen and pelvis for further evaluation or follow-up MRI to assess stability.\par 2. Presumed progression of the sacral insufficiency fractures with diffuse marrow edema and marrow replacement of the right sacral ala. Superimposed metastatic disease could not be absolutely excluded. CT may be helpful for further characterization or follow-up MRI to assess stability and to assess healing.\par \par Currently, taking Percocet at night for pain in the sacrum. Since 2015, has been on Gabapentin due to shingles. Also taking Methenamine for UTI. \par No fever, night sweats.Admits intermittent hot flashes. Difficulty sleeping due to pain in sacrum.\par \par PCP - Romaine Guzman \par \par  [de-identified] : Patient presents today for a follow-up.\par Reports neuropathy. Numbness in feet. Currently on Gabapentin 150 mg qHS\par Planning on following-up with dentist, has dental work pending. \par \par Pathology 10/18/21: Final diagnosis: 1. Right paraspinal lesion - Classic Hodgkin lymphoma.

## 2021-11-01 NOTE — ASSESSMENT
[FreeTextEntry1] : 71 year old female with remote history of R breast cancer in 1998 s/p CMF, and bilateral DCIS s/p b/l mastectomy in 2018, now presenting with Right paravertebral soft tissue lesion at the level of T12/L1 which was discovered on imaging s/p fall in 6/2021.  She is s/p core biopsy by IR on 9/14/21 which shows - suspicious for Hodgkin's lymphoma, favoring nodular lymphocyte predominant, however additional tissue needed for a definitive diagnosis. \par S/p repeat core biopsy on 10/18/21 --> consistent with classic Hodgkin's lymphoma. \par \par I reviewed the natural history of disease and prognosis with patient. WE also reviewed the pathology. She has 1 area of disease and has stage I disease with favorable prognosis. Discussed standard of care AVD (omitting Bleomycin) x 2 cycles followed by PET CT.  Depending on response, consider another 1-2 cycles followed by radiation. Side effects of chemotherapy reviewed include but not limited to fatigue, myelosuppression, leukopenia, alopecia, cardiotoxicity. \par \par She questioned the diagnosis and whether she really has Hodgkins' lymphoma.  A pathology report was provided to her. I discussed with her that chemotherapy should be started sooner rather than later. She has dental evauation pending would like to get that out of the way first. She's also considering 2nd opinion.\par \par \par Plan:\par Echo - patient will get done at her cardiologist's office - Dr. Gama Pace\par chemotherapy teaching by RN to be scheduled\par Patient will let me know once if she decides on getting treatment here\par \par \par

## 2021-11-01 NOTE — REASON FOR VISIT
[Follow-Up Visit] : a follow-up visit for [Family Member] : family member [FreeTextEntry2] : Hodgkin's Lymphoma

## 2021-11-01 NOTE — CONSULT LETTER
[Dear  ___] : Dear  [unfilled], [Consult Letter:] : I had the pleasure of evaluating your patient, [unfilled]. [Please see my note below.] : Please see my note below. [Consult Closing:] : Thank you very much for allowing me to participate in the care of this patient.  If you have any questions, please do not hesitate to contact me. [Sincerely,] : Sincerely, [FreeTextEntry3] : Amita Weller MD\par Medical Oncology/Hematology\par [facility] [place], [facility]\par \par \par [facility] at [facility]\par

## 2021-11-02 ENCOUNTER — APPOINTMENT (OUTPATIENT)
Dept: BREAST CENTER | Facility: CLINIC | Age: 71
End: 2021-11-02
Payer: MEDICARE

## 2021-11-02 VITALS
DIASTOLIC BLOOD PRESSURE: 87 MMHG | SYSTOLIC BLOOD PRESSURE: 148 MMHG | BODY MASS INDEX: 22.71 KG/M2 | HEIGHT: 64 IN | HEART RATE: 67 BPM | WEIGHT: 133 LBS

## 2021-11-02 DIAGNOSIS — R94.8 ABNORMAL RESULTS OF FUNCTION STUDIES OF OTHER ORGANS AND SYSTEMS: ICD-10-CM

## 2021-11-02 PROCEDURE — 99213 OFFICE O/P EST LOW 20 MIN: CPT

## 2021-11-02 RX ORDER — TIZANIDINE 4 MG/1
4 TABLET ORAL 3 TIMES DAILY
Qty: 30 | Refills: 0 | Status: DISCONTINUED | COMMUNITY
Start: 2021-08-05 | End: 2021-11-02

## 2021-11-02 NOTE — DATA REVIEWED
[FreeTextEntry1] : \par PET/CT 9/30/2021:  FDG avid oval soft tissue along right thoracic paraspinal region c/w oval mass on MRI.  Linear FDG avidity in the right sacral ala c/w fracture.  Minimal stranding adjacent to anterior surface of left breast implant, nonspecific.

## 2021-11-02 NOTE — PHYSICAL EXAM
[EOMI] : extra ocular movement intact [Sclera nonicteric] : sclera nonicteric [Supple] : supple [No Supraclavicular Adenopathy] : no supraclavicular adenopathy [No Cervical Adenopathy] : no cervical adenopathy [No Thyromegaly] : no thyromegaly [Clear to Auscultation Bilat] : clear to auscultation bilaterally [Asymmetrical] : asymmetrical [No dominant masses] : no dominant masses in right breast  [No dominant masses] : no dominant masses left breast [No Nipple Retraction] : no left nipple retraction [No Nipple Discharge] : no left nipple discharge [No Axillary Lymphadenopathy] : no left axillary lymphadenopathy [Soft] : abdomen soft [Not Tender] : non-tender [de-identified] : Right smaller than left. [de-identified] : Implant. Inframammary scar.   Older vertical scar 6:00. [de-identified] : Implant. Inframammary scar.

## 2021-11-02 NOTE — HISTORY OF PRESENT ILLNESS
[FreeTextEntry1] : This is a 71 year old  female who has a history of stage I right breast cancer (1 cm mod diff, ER+NC-) in 1998 (age 48) treated with a lumpectomy, sentinel node biopsy, CMF x6 months and radiation.  She also took Evista for 5 years.\par \par She was found to have an abnormality on her left mammogram in June 2018 and had an ultrasound guided core biopsy that showed in situ cancer.  She then had a breast MRI.  There were additional findings in each breast for which MRI guided core biopsies were performed.  Both the right and left MRI guided biopsies showed DCIS.\par \par She underwent a bilateral nipple sparing mastectomy with sentinel node biopsies and direct to prepectoral implant reconstruction on 9/11/2018. She had bilateral DCIS with close margins (Left anterior superior 1mm and right anterior superior <1 mm). She underwent re-excisions of the mastectomy margins with implant exchange on 4/25/2019.  The was no residual DCIS.  \par \par She was now diagnosed with Hodgkin's lymphoma.  She had fallen and had a sacral fracture.  There was an incidental spine lesion that underwent biopsy showing Hodgkin's.  She is going to go for another opinion before agreeing to chemotherapy.  The PET/CT that was done, showed a finding in the left breast.

## 2021-11-08 ENCOUNTER — OUTPATIENT (OUTPATIENT)
Dept: OUTPATIENT SERVICES | Facility: HOSPITAL | Age: 71
LOS: 1 days | End: 2021-11-08
Payer: MEDICARE

## 2021-11-08 ENCOUNTER — APPOINTMENT (OUTPATIENT)
Dept: MRI IMAGING | Facility: CLINIC | Age: 71
End: 2021-11-08
Payer: MEDICARE

## 2021-11-08 DIAGNOSIS — Z90.13 ACQUIRED ABSENCE OF BILATERAL BREASTS AND NIPPLES: Chronic | ICD-10-CM

## 2021-11-08 DIAGNOSIS — Z98.890 OTHER SPECIFIED POSTPROCEDURAL STATES: Chronic | ICD-10-CM

## 2021-11-08 DIAGNOSIS — Z96.7 PRESENCE OF OTHER BONE AND TENDON IMPLANTS: Chronic | ICD-10-CM

## 2021-11-08 DIAGNOSIS — Z90.89 ACQUIRED ABSENCE OF OTHER ORGANS: Chronic | ICD-10-CM

## 2021-11-08 DIAGNOSIS — D05.10 INTRADUCTAL CARCINOMA IN SITU OF UNSPECIFIED BREAST: ICD-10-CM

## 2021-11-08 PROCEDURE — A9585: CPT

## 2021-11-08 PROCEDURE — C8908: CPT

## 2021-11-08 PROCEDURE — 77049 MRI BREAST C-+ W/CAD BI: CPT | Mod: 26

## 2021-11-08 PROCEDURE — C8937: CPT

## 2021-11-10 ENCOUNTER — APPOINTMENT (OUTPATIENT)
Dept: HEMATOLOGY ONCOLOGY | Facility: CLINIC | Age: 71
End: 2021-11-10

## 2021-11-12 DIAGNOSIS — R92.8 OTHER ABNORMAL AND INCONCLUSIVE FINDINGS ON DIAGNOSTIC IMAGING OF BREAST: ICD-10-CM

## 2021-11-12 DIAGNOSIS — Z90.13 ACQUIRED ABSENCE OF BILATERAL BREASTS AND NIPPLES: ICD-10-CM

## 2021-12-02 ENCOUNTER — APPOINTMENT (OUTPATIENT)
Dept: CARDIOLOGY | Facility: CLINIC | Age: 71
End: 2021-12-02
Payer: MEDICARE

## 2021-12-02 PROCEDURE — 93306 TTE W/DOPPLER COMPLETE: CPT

## 2021-12-02 PROCEDURE — 76376 3D RENDER W/INTRP POSTPROCES: CPT

## 2021-12-02 PROCEDURE — 93356 MYOCRD STRAIN IMG SPCKL TRCK: CPT

## 2021-12-08 ENCOUNTER — TRANSCRIPTION ENCOUNTER (OUTPATIENT)
Age: 71
End: 2021-12-08

## 2021-12-27 ENCOUNTER — OUTPATIENT (OUTPATIENT)
Dept: OUTPATIENT SERVICES | Facility: HOSPITAL | Age: 71
LOS: 1 days | Discharge: HOME | End: 2021-12-27

## 2021-12-27 DIAGNOSIS — Z98.890 OTHER SPECIFIED POSTPROCEDURAL STATES: Chronic | ICD-10-CM

## 2021-12-27 DIAGNOSIS — Z96.7 PRESENCE OF OTHER BONE AND TENDON IMPLANTS: Chronic | ICD-10-CM

## 2021-12-27 DIAGNOSIS — Z90.89 ACQUIRED ABSENCE OF OTHER ORGANS: Chronic | ICD-10-CM

## 2021-12-27 DIAGNOSIS — D64.9 ANEMIA, UNSPECIFIED: ICD-10-CM

## 2021-12-27 DIAGNOSIS — Z90.13 ACQUIRED ABSENCE OF BILATERAL BREASTS AND NIPPLES: Chronic | ICD-10-CM

## 2021-12-29 ENCOUNTER — APPOINTMENT (OUTPATIENT)
Dept: HEMATOLOGY ONCOLOGY | Facility: CLINIC | Age: 71
End: 2021-12-29
Payer: MEDICARE

## 2021-12-29 VITALS
HEART RATE: 90 BPM | HEIGHT: 64 IN | BODY MASS INDEX: 22.93 KG/M2 | WEIGHT: 134.31 LBS | OXYGEN SATURATION: 99 % | SYSTOLIC BLOOD PRESSURE: 166 MMHG | DIASTOLIC BLOOD PRESSURE: 96 MMHG

## 2021-12-29 PROCEDURE — 99214 OFFICE O/P EST MOD 30 MIN: CPT

## 2021-12-29 NOTE — HISTORY OF PRESENT ILLNESS
[de-identified] : Ms. Wooten is a 70 y/o patient who is being seen for suspicion of Hogdkin's Lymphoma.  \par \par Her medical history is significant for a remote history of stage I ER+ right breast cancer in 1998, s/p lumpectomy, followed by CMF x 6 months and adjuvant RT. She took raloxifene for 5 years. 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 \par She has recurrent UTIs, spinal stenosis, RA.\par \par She is s/p fall in kitchen, 06/26/21. Admitted to hospital, fractured sacrum and fractured L2 (states that L2 fracture might have previously been there).\par 6/30/21 MRI sacrum - Acute H-shaped sacral fracture. Advanced bilateral hip osteoarthritis. Small to moderate-sized bilateral hip joint effusions with synovitis.\par 6/30/21 MRI thoracic spine -Multilevel small thoracic spine disc herniations with cord impingement. No cord signal abnormality.\par Chronic L2 superior anterior corner fracture.\par Probable nondisplaced sacrococcygeal fracture with presacral edema. Dedicated bony pelvis MRI CT is recommended.\par Right paravertebral soft tissue lesion at the level of T12/L1 suggests nerve sheath tumor. Nonemergent postcontrast images are recommended.\par \par She was referred to Dr. Interiano and subsequently had a core needle biopsy of the right paravertebral soft tissue mass at T12/L1 on 9/14/21. \par Pathology - suspicious for Hodgkins' lymphoma, favor nodular lymphocyte predominant. However additional tissue is recommended for further evaluation. \par \par \par PET/CT Skull-Thigh 09/30/21: IMPRESSION: FDG-PET/CT scan:\par 1. FDG avid oval soft tissue along the right thoracic paraspinal region, corresponding to the enhancing oval mass on MRI 8/2/2021.\par 2. Linear FDG avidity in the right sacral ala, corresponding to insufficiency fracture, also noted on MRI.\par 3. Minimal stranding adjacent to the anterior surface of the left breast implant, nonspecific. Please correlate clinically.\par \par T Cell Gene Rearrangement 09/22/21: TCR-beta: Negative, TCR-gamma: Negative\par Interpretation: No discrete bands were identified in either TCR-beta or TCR-gamma, suggesting a polycional T-cell population\par \par Pathology 09/14/21: Final Diagnosis\par 1.  Paraspinal mass, right, core biopsies:\par - Suspicious for Hodgkin Lymphoma.\par  \par MRI Lumbar Spine 08/02/21: IMPRESSION:\par 1. Enhancing oval soft tissue mass is again seen along the inferior margin of the right 12th rib at the costovertebral junction, abutting the pleura, and without T12-L1 foraminal extension. This appears stable and a peripheral nerve sheath tumor may be considered as well as other processes including metastatic disease. Correlation with older imaging is suggested if available to assess stability. Consider PET CT or CT chest abdomen and pelvis for further evaluation or follow-up MRI to assess stability.\par 2. Presumed progression of the sacral insufficiency fractures with diffuse marrow edema and marrow replacement of the right sacral ala. Superimposed metastatic disease could not be absolutely excluded. CT may be helpful for further characterization or follow-up MRI to assess stability and to assess healing.\par \par Currently, taking Percocet at night for pain in the sacrum. Since 2015, has been on Gabapentin due to shingles. Also taking Methenamine for UTI. \par No fever, night sweats.Admits intermittent hot flashes. Difficulty sleeping due to pain in sacrum.\par \par PCP - Romaine Guzman \par \par  [de-identified] : Returns for follow up visit. \par Obtained second opinion at [facility]. 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

## 2021-12-29 NOTE — CONSULT LETTER
[Dear  ___] : Dear  [unfilled], [Consult Letter:] : I had the pleasure of evaluating your patient, [unfilled]. [Please see my note below.] : Please see my note below. [Consult Closing:] : Thank you very much for allowing me to participate in the care of this patient.  If you have any questions, please do not hesitate to contact me. [Sincerely,] : Sincerely, [FreeTextEntry3] : Amita Weller MD\par Medical Oncology/Hematology\par [facility] [place], [facility]\par \par \par [facility] at [facility]\par

## 2021-12-29 NOTE — ASSESSMENT
[FreeTextEntry1] : 71 year old female with remote history of R breast cancer in 1998 s/p CMF, and bilateral DCIS s/p b/l mastectomy in 2018, now presenting with Right paravertebral soft tissue lesion at the level of T12/L1 which was discovered on imaging s/p fall in 6/2021.  She is s/p core biopsy by IR on 9/14/21 which shows - suspicious for Hodgkin's lymphoma, favoring nodular lymphocyte predominant, however additional tissue needed for a definitive diagnosis. \par S/p repeat core biopsy on 10/18/21 --> consistent with classic Hodgkin's lymphoma. \par 12/2/2021 ECHO LV EF 60 to 65%\par \par Rediscussed the natural history of disease and prognosis with patient. We also reviewed the pathology. She has 1 area of disease and has stage I disease with favorable prognosis. Discussed standard of care ABVD x 2 cycles followed by PET CT.  Depending on response, consider another 1-2 cycles followed by radiation. Side effects of chemotherapy reviewed include but not limited to allergic reaction, fatigue, nausea, vomiting, myelosuppression, increase risk of infection, leukopenia, liver dysfunction, neuropathy, rash, lymphopenia, alopecia, cardiotoxicity and increase risk of secondary leukemia.  Discussed omitting bleomycin given her age and risk for toxicity. 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  She's agreeable to AVD and signed informed consent. \par \par Patient requests to start first week of 2/2022, I discussed with her that chemotherapy should be started sooner rather than later. \par \par Plan:\par Start AVD (omitting Bleomycin) x 2 cycles followed by PET CT.  Depending on response, consider another 1-2 cycles followed by radiation. \par PET/CT ordered \par Follow up C1D15

## 2022-01-19 ENCOUNTER — RESULT REVIEW (OUTPATIENT)
Age: 72
End: 2022-01-19

## 2022-01-27 ENCOUNTER — OUTPATIENT (OUTPATIENT)
Dept: OUTPATIENT SERVICES | Facility: HOSPITAL | Age: 72
LOS: 1 days | Discharge: HOME | End: 2022-01-27
Payer: COMMERCIAL

## 2022-01-27 DIAGNOSIS — Z98.890 OTHER SPECIFIED POSTPROCEDURAL STATES: Chronic | ICD-10-CM

## 2022-01-27 DIAGNOSIS — D64.9 ANEMIA, UNSPECIFIED: ICD-10-CM

## 2022-01-27 DIAGNOSIS — Z96.7 PRESENCE OF OTHER BONE AND TENDON IMPLANTS: Chronic | ICD-10-CM

## 2022-01-27 DIAGNOSIS — Z90.89 ACQUIRED ABSENCE OF OTHER ORGANS: Chronic | ICD-10-CM

## 2022-01-27 DIAGNOSIS — Z90.13 ACQUIRED ABSENCE OF BILATERAL BREASTS AND NIPPLES: Chronic | ICD-10-CM

## 2022-01-28 ENCOUNTER — APPOINTMENT (OUTPATIENT)
Dept: NUCLEAR MEDICINE | Facility: CLINIC | Age: 72
End: 2022-01-28

## 2022-01-28 ENCOUNTER — OUTPATIENT (OUTPATIENT)
Dept: OUTPATIENT SERVICES | Facility: HOSPITAL | Age: 72
LOS: 1 days | End: 2022-01-28

## 2022-01-28 DIAGNOSIS — Z98.890 OTHER SPECIFIED POSTPROCEDURAL STATES: Chronic | ICD-10-CM

## 2022-01-28 DIAGNOSIS — Z96.7 PRESENCE OF OTHER BONE AND TENDON IMPLANTS: Chronic | ICD-10-CM

## 2022-01-28 DIAGNOSIS — Z90.89 ACQUIRED ABSENCE OF OTHER ORGANS: Chronic | ICD-10-CM

## 2022-01-28 DIAGNOSIS — Z90.13 ACQUIRED ABSENCE OF BILATERAL BREASTS AND NIPPLES: Chronic | ICD-10-CM

## 2022-01-28 DIAGNOSIS — C81.90 HODGKIN LYMPHOMA, UNSPECIFIED, UNSPECIFIED SITE: ICD-10-CM

## 2022-01-28 PROCEDURE — 78815 PET IMAGE W/CT SKULL-THIGH: CPT | Mod: 26,PI

## 2022-01-31 ENCOUNTER — APPOINTMENT (OUTPATIENT)
Dept: HEMATOLOGY ONCOLOGY | Facility: CLINIC | Age: 72
End: 2022-01-31
Payer: COMMERCIAL

## 2022-01-31 PROCEDURE — 99214 OFFICE O/P EST MOD 30 MIN: CPT | Mod: 95

## 2022-01-31 NOTE — ASSESSMENT
[FreeTextEntry1] : 71 year old female with remote history of R breast cancer in 1998 s/p CMF, and bilateral DCIS s/p b/l mastectomy in 2018, now presenting with Right paravertebral soft tissue lesion at the level of T12/L1 which was discovered on imaging s/p fall in 6/2021.  She is s/p core biopsy by IR on 9/14/21 which shows - suspicious for Hodgkin's lymphoma, favoring nodular lymphocyte predominant, however additional tissue needed for a definitive diagnosis. \par S/p repeat core biopsy on 10/18/21 --> consistent with classic Hodgkin's lymphoma. She has stage I favorable disease. She deferred starting treatment until February 2022. \par 12/2/2021 ECHO LV EF 60 to 65%\par \par Reviewed 1/28/22 PET CT with no change compared to prior PET. \par Discussed standard of care ABVD x 2 cycles followed by PET CT.  Depending on response, consider another 1-2 cycles followed by radiation. Side effects of chemotherapy reviewed include but not limited to allergic reaction, fatigue, nausea, vomiting, myelosuppression, increase risk of infection, leukopenia, liver dysfunction, neuropathy, rash, lymphopenia, alopecia, cardiotoxicity and increase risk of secondary leukemia.  Discussed omitting bleomycin given her age and risk for toxicity. 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  She's has agreed to AVD. \par \par \par Plan:\par Start AVD (omitting Bleomycin) x 2 cycles followed by PET CT.  Depending on response, consider another 1-2 cycles followed by radiation. \par Follow up C1D15

## 2022-01-31 NOTE — CONSULT LETTER
[Dear  ___] : Dear  [unfilled], [Consult Letter:] : I had the pleasure of evaluating your patient, [unfilled]. [Please see my note below.] : Please see my note below. [Consult Closing:] : Thank you very much for allowing me to participate in the care of this patient.  If you have any questions, please do not hesitate to contact me. [Sincerely,] : Sincerely, [FreeTextEntry3] : Amita Weller MD\par Medical Oncology/Hematology\par [facility] [place], [facility]\par \par \par [facility] at [facility]\par

## 2022-01-31 NOTE — HISTORY OF PRESENT ILLNESS
[Home] : at home, [unfilled] , at the time of the visit. [Medical Office: ([facility])___] : at the medical office located in  [Verbal consent obtained from patient] : the patient, [unfilled] [de-identified] : Ms. Wooten is a 72 y/o patient who is being seen for suspicion of Hogdkin's Lymphoma.  \par \par Her medical history is significant for a remote history of stage I ER+ right breast cancer in 1998, s/p lumpectomy, followed by CMF x 6 months and adjuvant RT. She took raloxifene for 5 years. 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 \par She has recurrent UTIs, spinal stenosis, RA.\par \par She is s/p fall in kitchen, 06/26/21. Admitted to hospital, fractured sacrum and fractured L2 (states that L2 fracture might have previously been there).\par 6/30/21 MRI sacrum - Acute H-shaped sacral fracture. Advanced bilateral hip osteoarthritis. Small to moderate-sized bilateral hip joint effusions with synovitis.\par 6/30/21 MRI thoracic spine -Multilevel small thoracic spine disc herniations with cord impingement. No cord signal abnormality.\par Chronic L2 superior anterior corner fracture.\par Probable nondisplaced sacrococcygeal fracture with presacral edema. Dedicated bony pelvis MRI CT is recommended.\par Right paravertebral soft tissue lesion at the level of T12/L1 suggests nerve sheath tumor. Nonemergent postcontrast images are recommended.\par \par She was referred to Dr. Interiano and subsequently had a core needle biopsy of the right paravertebral soft tissue mass at T12/L1 on 9/14/21. \par Pathology - suspicious for Hodgkins' lymphoma, favor nodular lymphocyte predominant. However additional tissue is recommended for further evaluation. \par \par \par PET/CT Skull-Thigh 09/30/21: IMPRESSION: FDG-PET/CT scan:\par 1. FDG avid oval soft tissue along the right thoracic paraspinal region, corresponding to the enhancing oval mass on MRI 8/2/2021.\par 2. Linear FDG avidity in the right sacral ala, corresponding to insufficiency fracture, also noted on MRI.\par 3. Minimal stranding adjacent to the anterior surface of the left breast implant, nonspecific. Please correlate clinically.\par \par T Cell Gene Rearrangement 09/22/21: TCR-beta: Negative, TCR-gamma: Negative\par Interpretation: No discrete bands were identified in either TCR-beta or TCR-gamma, suggesting a polycional T-cell population\par \par Pathology 09/14/21: Final Diagnosis\par 1.  Paraspinal mass, right, core biopsies:\par - Suspicious for Hodgkin Lymphoma.\par  \par MRI Lumbar Spine 08/02/21: IMPRESSION:\par 1. Enhancing oval soft tissue mass is again seen along the inferior margin of the right 12th rib at the costovertebral junction, abutting the pleura, and without T12-L1 foraminal extension. This appears stable and a peripheral nerve sheath tumor may be considered as well as other processes including metastatic disease. Correlation with older imaging is suggested if available to assess stability. Consider PET CT or CT chest abdomen and pelvis for further evaluation or follow-up MRI to assess stability.\par 2. Presumed progression of the sacral insufficiency fractures with diffuse marrow edema and marrow replacement of the right sacral ala. Superimposed metastatic disease could not be absolutely excluded. CT may be helpful for further characterization or follow-up MRI to assess stability and to assess healing.\par \par Currently, taking Percocet at night for pain in the sacrum. Since 2015, has been on Gabapentin due to shingles. Also taking Methenamine for UTI. \par No fever, night sweats.Admits intermittent hot flashes. Difficulty sleeping due to pain in sacrum.\par \par PCP - Romaine Guzman \par \par  [de-identified] : Returns for follow up visit. \par Feels well.\par No weight loss, fevers, night sweats.\par No new pain.

## 2022-02-03 ENCOUNTER — NON-APPOINTMENT (OUTPATIENT)
Age: 72
End: 2022-02-03

## 2022-02-04 ENCOUNTER — RESULT REVIEW (OUTPATIENT)
Age: 72
End: 2022-02-04

## 2022-02-04 ENCOUNTER — LABORATORY RESULT (OUTPATIENT)
Age: 72
End: 2022-02-04

## 2022-02-04 ENCOUNTER — APPOINTMENT (OUTPATIENT)
Age: 72
End: 2022-02-04

## 2022-02-04 LAB
ALBUMIN SERPL ELPH-MCNC: 4.8 G/DL — SIGNIFICANT CHANGE UP (ref 3.3–5)
ALP SERPL-CCNC: 70 U/L — SIGNIFICANT CHANGE UP (ref 40–120)
ALT FLD-CCNC: 22 U/L — SIGNIFICANT CHANGE UP (ref 10–45)
ANION GAP SERPL CALC-SCNC: 16 MMOL/L — SIGNIFICANT CHANGE UP (ref 5–17)
AST SERPL-CCNC: 31 U/L — SIGNIFICANT CHANGE UP (ref 10–40)
BASOPHILS # BLD AUTO: 0.1 K/UL — SIGNIFICANT CHANGE UP (ref 0–0.2)
BASOPHILS NFR BLD AUTO: 1.2 % — SIGNIFICANT CHANGE UP (ref 0–2)
BILIRUB SERPL-MCNC: 0.7 MG/DL — SIGNIFICANT CHANGE UP (ref 0.2–1.2)
BUN SERPL-MCNC: 26 MG/DL — HIGH (ref 7–23)
CALCIUM SERPL-MCNC: 9.7 MG/DL — SIGNIFICANT CHANGE UP (ref 8.4–10.5)
CHLORIDE SERPL-SCNC: 102 MMOL/L — SIGNIFICANT CHANGE UP (ref 96–108)
CO2 SERPL-SCNC: 22 MMOL/L — SIGNIFICANT CHANGE UP (ref 22–31)
CREAT SERPL-MCNC: 0.47 MG/DL — LOW (ref 0.5–1.3)
EOSINOPHIL # BLD AUTO: 0.1 K/UL — SIGNIFICANT CHANGE UP (ref 0–0.5)
EOSINOPHIL NFR BLD AUTO: 1.1 % — SIGNIFICANT CHANGE UP (ref 0–6)
GLUCOSE SERPL-MCNC: 93 MG/DL — SIGNIFICANT CHANGE UP (ref 70–99)
HCT VFR BLD CALC: 44.4 % — SIGNIFICANT CHANGE UP (ref 34.5–45)
HGB BLD-MCNC: 14.3 G/DL — SIGNIFICANT CHANGE UP (ref 11.5–15.5)
LDH SERPL L TO P-CCNC: 211 U/L — SIGNIFICANT CHANGE UP (ref 50–242)
LYMPHOCYTES # BLD AUTO: 1.7 K/UL — SIGNIFICANT CHANGE UP (ref 1–3.3)
LYMPHOCYTES # BLD AUTO: 22.5 % — SIGNIFICANT CHANGE UP (ref 13–44)
MCHC RBC-ENTMCNC: 29.5 PG — SIGNIFICANT CHANGE UP (ref 27–34)
MCHC RBC-ENTMCNC: 32.2 G/DL — SIGNIFICANT CHANGE UP (ref 32–36)
MCV RBC AUTO: 91.5 FL — SIGNIFICANT CHANGE UP (ref 80–100)
MONOCYTES # BLD AUTO: 0.7 K/UL — SIGNIFICANT CHANGE UP (ref 0–0.9)
MONOCYTES NFR BLD AUTO: 8.7 % — SIGNIFICANT CHANGE UP (ref 2–14)
NEUTROPHILS # BLD AUTO: 5.1 K/UL — SIGNIFICANT CHANGE UP (ref 1.8–7.4)
NEUTROPHILS NFR BLD AUTO: 66.5 % — SIGNIFICANT CHANGE UP (ref 43–77)
PLATELET # BLD AUTO: 168 K/UL — SIGNIFICANT CHANGE UP (ref 150–400)
POTASSIUM SERPL-MCNC: 4.2 MMOL/L — SIGNIFICANT CHANGE UP (ref 3.5–5.3)
POTASSIUM SERPL-SCNC: 4.2 MMOL/L — SIGNIFICANT CHANGE UP (ref 3.5–5.3)
PROT SERPL-MCNC: 8.1 G/DL — SIGNIFICANT CHANGE UP (ref 6–8.3)
RBC # BLD: 4.85 M/UL — SIGNIFICANT CHANGE UP (ref 3.8–5.2)
RBC # FLD: 13 % — SIGNIFICANT CHANGE UP (ref 10.3–14.5)
SODIUM SERPL-SCNC: 140 MMOL/L — SIGNIFICANT CHANGE UP (ref 135–145)
WBC # BLD: 7.7 K/UL — SIGNIFICANT CHANGE UP (ref 3.8–10.5)
WBC # FLD AUTO: 7.7 K/UL — SIGNIFICANT CHANGE UP (ref 3.8–10.5)

## 2022-02-04 RX ORDER — METHENAMINE MANDELATE 1 G
1 TABLET ORAL
Qty: 0 | Refills: 0 | DISCHARGE

## 2022-02-05 DIAGNOSIS — Z51.11 ENCOUNTER FOR ANTINEOPLASTIC CHEMOTHERAPY: ICD-10-CM

## 2022-02-05 DIAGNOSIS — R11.2 NAUSEA WITH VOMITING, UNSPECIFIED: ICD-10-CM

## 2022-02-05 LAB
HAV IGM SER-ACNC: SIGNIFICANT CHANGE UP
HBV CORE IGM SER-ACNC: SIGNIFICANT CHANGE UP
HBV SURFACE AG SER-ACNC: SIGNIFICANT CHANGE UP
HCV AB S/CO SERPL IA: 0.12 S/CO — SIGNIFICANT CHANGE UP (ref 0–0.99)
HCV AB SERPL-IMP: SIGNIFICANT CHANGE UP

## 2022-02-16 ENCOUNTER — APPOINTMENT (OUTPATIENT)
Dept: HEMATOLOGY ONCOLOGY | Facility: CLINIC | Age: 72
End: 2022-02-16

## 2022-02-16 ENCOUNTER — APPOINTMENT (OUTPATIENT)
Dept: HEMATOLOGY ONCOLOGY | Facility: CLINIC | Age: 72
End: 2022-02-16
Payer: MEDICARE

## 2022-02-16 DIAGNOSIS — G47.00 INSOMNIA, UNSPECIFIED: ICD-10-CM

## 2022-02-16 DIAGNOSIS — R12 HEARTBURN: ICD-10-CM

## 2022-02-16 PROCEDURE — 99215 OFFICE O/P EST HI 40 MIN: CPT | Mod: 95

## 2022-02-16 RX ORDER — NITROFURANTOIN (MONOHYDRATE/MACROCRYSTALS) 25; 75 MG/1; MG/1
100 CAPSULE ORAL DAILY
Qty: 4 | Refills: 0 | Status: DISCONTINUED | COMMUNITY
Start: 2019-03-26 | End: 2022-02-16

## 2022-02-16 RX ORDER — NITROFURANTOIN (MONOHYDRATE/MACROCRYSTALS) 25; 75 MG/1; MG/1
100 CAPSULE ORAL
Qty: 14 | Refills: 0 | Status: DISCONTINUED | COMMUNITY
Start: 2019-10-17 | End: 2022-02-16

## 2022-02-16 RX ORDER — NITROFURANTOIN (MONOHYDRATE/MACROCRYSTALS) 25; 75 MG/1; MG/1
100 CAPSULE ORAL
Qty: 6 | Refills: 0 | Status: DISCONTINUED | COMMUNITY
Start: 2020-02-03 | End: 2022-02-16

## 2022-02-16 RX ORDER — FAMOTIDINE 20 MG/1
20 TABLET, FILM COATED ORAL DAILY
Qty: 30 | Refills: 0 | Status: ACTIVE | COMMUNITY
Start: 2022-02-16 | End: 1900-01-01

## 2022-02-16 RX ORDER — NITROFURANTOIN MACROCRYSTALS 50 MG/1
50 CAPSULE ORAL
Qty: 30 | Refills: 2 | Status: DISCONTINUED | COMMUNITY
Start: 2020-02-14 | End: 2022-02-16

## 2022-02-16 NOTE — ASSESSMENT
[FreeTextEntry1] : 71 year old female with remote history of R breast cancer in 1998 s/p CMF, and bilateral DCIS s/p b/l mastectomy in 2018, now presenting with Right paravertebral soft tissue lesion at the level of T12/L1 which was discovered on imaging s/p fall in 6/2021.  She is s/p core biopsy by IR on 9/14/21 which shows - suspicious for Hodgkin's lymphoma, favoring nodular lymphocyte predominant, however additional tissue needed for a definitive diagnosis. \par S/p repeat core biopsy on 10/18/21 --> consistent with classic Hodgkin's lymphoma. She has stage I favorable disease. She deferred starting treatment until February 2022. \par 12/2/2021 ECHO LV EF 60 to 65%\par 1/28/22 PET CT with no change compared to prior PET. \par \par S/p C1 D1 AVD on 2/4/22. Multiple minor side effects: nausea, facial flushing, mouth sores, insomnia, heartburn. \par Notes urinary frequency.\par \par Plan:\par Continue AVD (omitting Bleomycin) x 2 cycles followed by PET CT.  Depending on response, consider another 1-2 cycles followed by radiation. \par Proceed with C1 D15 on 2/18/22\par Insomnia - Trazodone 50 mg q HS\par Heartburn - pepcid 20 mg daily\par Nausea - can take compazine pre-emptively starting day 4\par Urinary frequency - UA / UCx on 2/18/22. Is afebrile.\par Follow up in 2 weeks

## 2022-02-16 NOTE — HISTORY OF PRESENT ILLNESS
[Home] : at home, [unfilled] , at the time of the visit. [Medical Office: ([facility])___] : at the medical office located in  [Verbal consent obtained from patient] : the patient, [unfilled] [de-identified] : Ms. Wooten is a 72 y/o patient who is being seen for suspicion of Hogdkin's Lymphoma.  \par \par Her medical history is significant for a remote history of stage I ER+ right breast cancer in 1998, s/p lumpectomy, followed by CMF x 6 months and adjuvant RT. She took raloxifene for 5 years. 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 \par She has recurrent UTIs, spinal stenosis, RA.\par \par She is s/p fall in kitchen, 06/26/21. Admitted to hospital, fractured sacrum and fractured L2 (states that L2 fracture might have previously been there).\par 6/30/21 MRI sacrum - Acute H-shaped sacral fracture. Advanced bilateral hip osteoarthritis. Small to moderate-sized bilateral hip joint effusions with synovitis.\par 6/30/21 MRI thoracic spine -Multilevel small thoracic spine disc herniations with cord impingement. No cord signal abnormality.\par Chronic L2 superior anterior corner fracture.\par Probable nondisplaced sacrococcygeal fracture with presacral edema. Dedicated bony pelvis MRI CT is recommended.\par Right paravertebral soft tissue lesion at the level of T12/L1 suggests nerve sheath tumor. Nonemergent postcontrast images are recommended.\par \par She was referred to Dr. Interiano and subsequently had a core needle biopsy of the right paravertebral soft tissue mass at T12/L1 on 9/14/21. \par Pathology - suspicious for Hodgkins' lymphoma, favor nodular lymphocyte predominant. However additional tissue is recommended for further evaluation. \par \par \par PET/CT Skull-Thigh 09/30/21: IMPRESSION: FDG-PET/CT scan:\par 1. FDG avid oval soft tissue along the right thoracic paraspinal region, corresponding to the enhancing oval mass on MRI 8/2/2021.\par 2. Linear FDG avidity in the right sacral ala, corresponding to insufficiency fracture, also noted on MRI.\par 3. Minimal stranding adjacent to the anterior surface of the left breast implant, nonspecific. Please correlate clinically.\par \par T Cell Gene Rearrangement 09/22/21: TCR-beta: Negative, TCR-gamma: Negative\par Interpretation: No discrete bands were identified in either TCR-beta or TCR-gamma, suggesting a polycional T-cell population\par \par Pathology 09/14/21: Final Diagnosis\par 1.  Paraspinal mass, right, core biopsies:\par - Suspicious for Hodgkin Lymphoma.\par  \par MRI Lumbar Spine 08/02/21: IMPRESSION:\par 1. Enhancing oval soft tissue mass is again seen along the inferior margin of the right 12th rib at the costovertebral junction, abutting the pleura, and without T12-L1 foraminal extension. This appears stable and a peripheral nerve sheath tumor may be considered as well as other processes including metastatic disease. Correlation with older imaging is suggested if available to assess stability. Consider PET CT or CT chest abdomen and pelvis for further evaluation or follow-up MRI to assess stability.\par 2. Presumed progression of the sacral insufficiency fractures with diffuse marrow edema and marrow replacement of the right sacral ala. Superimposed metastatic disease could not be absolutely excluded. CT may be helpful for further characterization or follow-up MRI to assess stability and to assess healing.\par \par Currently, taking Percocet at night for pain in the sacrum. Since 2015, has been on Gabapentin due to shingles. Also taking Methenamine for UTI. \par No fever, night sweats.Admits intermittent hot flashes. Difficulty sleeping due to pain in sacrum.\par \par PCP - Romaine Guzman \par \par  [de-identified] : Returns for follow up visit. S/p C1 D1 AVD on 2/4/22\par Did well for 2 days and then had severe all over nerve pain that lasted 1 day.\par She also noted a flushing of her face for 24 hours and then resolved.\par Developed nausea on day 4 for which she took compazine with good relief, lasted 3 days.\par Notes minor mouth sores, not impacting eating.\par Notes insomnia, and is requesting Klonopin.\par Also reports heartburn.\par Notes urinary frequency.

## 2022-02-16 NOTE — CONSULT LETTER
[Dear  ___] : Dear  [unfilled], [Consult Letter:] : I had the pleasure of evaluating your patient, [unfilled]. [Please see my note below.] : Please see my note below. [Consult Closing:] : Thank you very much for allowing me to participate in the care of this patient.  If you have any questions, please do not hesitate to contact me. [Sincerely,] : Sincerely, [FreeTextEntry3] : Amita Weller MD\par Medical Oncology/Hematology\par [facility] [place], [facility]\par \par \par [facility] [employer] [facility] at [facility]\par

## 2022-02-17 ENCOUNTER — APPOINTMENT (OUTPATIENT)
Dept: HEMATOLOGY ONCOLOGY | Facility: CLINIC | Age: 72
End: 2022-02-17

## 2022-02-18 ENCOUNTER — APPOINTMENT (OUTPATIENT)
Dept: PHYSICAL MEDICINE AND REHAB | Facility: CLINIC | Age: 72
End: 2022-02-18
Payer: MEDICARE

## 2022-02-18 ENCOUNTER — APPOINTMENT (OUTPATIENT)
Age: 72
End: 2022-02-18

## 2022-02-18 ENCOUNTER — RESULT REVIEW (OUTPATIENT)
Age: 72
End: 2022-02-18

## 2022-02-18 ENCOUNTER — LABORATORY RESULT (OUTPATIENT)
Age: 72
End: 2022-02-18

## 2022-02-18 VITALS
HEART RATE: 84 BPM | DIASTOLIC BLOOD PRESSURE: 90 MMHG | HEIGHT: 64 IN | BODY MASS INDEX: 22.88 KG/M2 | SYSTOLIC BLOOD PRESSURE: 167 MMHG | WEIGHT: 134 LBS

## 2022-02-18 DIAGNOSIS — R11.0 NAUSEA: ICD-10-CM

## 2022-02-18 DIAGNOSIS — Z00.00 ENCOUNTER FOR GENERAL ADULT MEDICAL EXAMINATION W/OUT ABNORMAL FINDINGS: ICD-10-CM

## 2022-02-18 DIAGNOSIS — T45.1X5A NAUSEA: ICD-10-CM

## 2022-02-18 LAB
BASOPHILS # BLD AUTO: 0.1 K/UL — SIGNIFICANT CHANGE UP (ref 0–0.2)
BASOPHILS NFR BLD AUTO: 2 % — SIGNIFICANT CHANGE UP (ref 0–2)
EOSINOPHIL # BLD AUTO: 0.1 K/UL — SIGNIFICANT CHANGE UP (ref 0–0.5)
EOSINOPHIL NFR BLD AUTO: 3.1 % — SIGNIFICANT CHANGE UP (ref 0–6)
HCT VFR BLD CALC: 38.5 % — SIGNIFICANT CHANGE UP (ref 34.5–45)
HGB BLD-MCNC: 12.7 G/DL — SIGNIFICANT CHANGE UP (ref 11.5–15.5)
LYMPHOCYTES # BLD AUTO: 1.3 K/UL — SIGNIFICANT CHANGE UP (ref 1–3.3)
LYMPHOCYTES # BLD AUTO: 37.4 % — SIGNIFICANT CHANGE UP (ref 13–44)
MCHC RBC-ENTMCNC: 30.9 PG — SIGNIFICANT CHANGE UP (ref 27–34)
MCHC RBC-ENTMCNC: 33 G/DL — SIGNIFICANT CHANGE UP (ref 32–36)
MCV RBC AUTO: 93.5 FL — SIGNIFICANT CHANGE UP (ref 80–100)
MONOCYTES # BLD AUTO: 0.6 K/UL — SIGNIFICANT CHANGE UP (ref 0–0.9)
MONOCYTES NFR BLD AUTO: 16.5 % — HIGH (ref 2–14)
NEUTROPHILS # BLD AUTO: 1.4 K/UL — LOW (ref 1.8–7.4)
NEUTROPHILS NFR BLD AUTO: 40.9 % — LOW (ref 43–77)
PLATELET # BLD AUTO: 260 K/UL — SIGNIFICANT CHANGE UP (ref 150–400)
RBC # BLD: 4.12 M/UL — SIGNIFICANT CHANGE UP (ref 3.8–5.2)
RBC # FLD: 13.6 % — SIGNIFICANT CHANGE UP (ref 10.3–14.5)
WBC # BLD: 3.4 K/UL — LOW (ref 3.8–10.5)
WBC # FLD AUTO: 3.4 K/UL — LOW (ref 3.8–10.5)

## 2022-02-18 PROCEDURE — 99204 OFFICE O/P NEW MOD 45 MIN: CPT

## 2022-02-18 NOTE — PHYSICAL EXAM
[FreeTextEntry1] : Gen: Patient is A&O x 3, NAD\par HEENT: EOMI, hearing grossly normal\par Resp: regular, non - labored\par CV: pulses regular\par Skin: no rashes, erythema\par Lymph: no clubbing, cyanosis, edema, or palpable lymphadenopathy\par Inspection: no instability or misalignment\par ROM: full throughout\par Palpation: Dysesthesia to light touch in bilateral feet \par Sensation: Decreased to light touch in bilateral hands and feet \par Reflexes: 1+ and symmetric throughout\par Strength: 5/5 throughout, except 4/5 in right hip flexion\par Special tests: -straight leg raise\par Gait: normal, non-antalgic, difficulty standing tandem \par \par

## 2022-02-18 NOTE — ASSESSMENT
[FreeTextEntry1] : 71 year old female presenting for evaluation.\par \par #Chemotherapy induced nausea:\par -Discussed risks and benefits of medical cannabis to improve symptoms.\par -Medical cannabis certification completed today. Provided cannabis education, overview of state program, and discussed adverse effects in detail. Counseled that vaporized cannabis is not the preferred route of administration due to the fact that both short-term and long-term risks associated with vaporizing oils are not yet fully understood. Recommend starting with 1:1 THC:CBD formulation at low dose of THC.  \par \par #Neuropathy:\par -Increase gabapentin to 300mg TID\par -Discussed if no improvement can increase dose or trial opiates to allow her to tolerate treatment.  \par \par #Impaired mobility:\par -Discussed stating PT for balance and gait training, she would like to defer until completion of chemotherapy.\par \par Follow up in 1 month.  \par

## 2022-02-18 NOTE — HISTORY OF PRESENT ILLNESS
[FreeTextEntry1] : Ms. Wooten is a 71 year old female with remote history of R breast cancer in 1998 s/p CMF, and bilateral DCIS s/p b/l mastectomy in 2018, now presenting with Right paravertebral soft tissue lesion at the level of T12/L1 which was discovered on imaging s/p fall in 6/2021. She is s/p core biopsy by IR on 9/14/21 which shows - suspicious for Hodgkin's lymphoma, favoring nodular lymphocyte predominant, however additional tissue needed for a definitive diagnosis.  S/p repeat core biopsy on 10/18/21 --> consistent with classic Hodgkin's lymphoma. Chemotherapy with AVD started on 2/4/22. \par \par  \par She reports since beginning treatment she has had more episodes of nausea.  She also reports she has chronic neuropathy symptoms.  She states that they were exacerbated by her recent chemotherapy treatment.  She described more shooting and burning pain.  She also describes some difficulty with his mobility.  She has some difficulty with her balance.  She denies any bowel or bladder dysfunction.\par \par \par

## 2022-02-18 NOTE — REASON FOR VISIT
[Initial Evaluation] : an initial evaluation [FreeTextEntry1] : Neuropathy, Nausea, Impaired mobility

## 2022-02-19 LAB
ALBUMIN SERPL ELPH-MCNC: 4.6 G/DL — SIGNIFICANT CHANGE UP (ref 3.3–5)
ALP SERPL-CCNC: 62 U/L — SIGNIFICANT CHANGE UP (ref 40–120)
ALT FLD-CCNC: 18 U/L — SIGNIFICANT CHANGE UP (ref 10–45)
ANION GAP SERPL CALC-SCNC: 15 MMOL/L — SIGNIFICANT CHANGE UP (ref 5–17)
AST SERPL-CCNC: 37 U/L — SIGNIFICANT CHANGE UP (ref 10–40)
BILIRUB SERPL-MCNC: 0.3 MG/DL — SIGNIFICANT CHANGE UP (ref 0.2–1.2)
BUN SERPL-MCNC: 25 MG/DL — HIGH (ref 7–23)
CALCIUM SERPL-MCNC: 9.4 MG/DL — SIGNIFICANT CHANGE UP (ref 8.4–10.5)
CHLORIDE SERPL-SCNC: 104 MMOL/L — SIGNIFICANT CHANGE UP (ref 96–108)
CO2 SERPL-SCNC: 20 MMOL/L — LOW (ref 22–31)
CREAT SERPL-MCNC: 0.47 MG/DL — LOW (ref 0.5–1.3)
GLUCOSE SERPL-MCNC: 89 MG/DL — SIGNIFICANT CHANGE UP (ref 70–99)
LDH SERPL L TO P-CCNC: 316 U/L — HIGH (ref 50–242)
POTASSIUM SERPL-MCNC: 4.8 MMOL/L — SIGNIFICANT CHANGE UP (ref 3.5–5.3)
POTASSIUM SERPL-SCNC: 4.8 MMOL/L — SIGNIFICANT CHANGE UP (ref 3.5–5.3)
PROT SERPL-MCNC: 7.6 G/DL — SIGNIFICANT CHANGE UP (ref 6–8.3)
SODIUM SERPL-SCNC: 139 MMOL/L — SIGNIFICANT CHANGE UP (ref 135–145)

## 2022-02-22 DIAGNOSIS — C81.11 NODULAR SCLEROSIS HODGKIN LYMPHOMA, LYMPH NODES OF HEAD, FACE, AND NECK: ICD-10-CM

## 2022-02-22 DIAGNOSIS — C81.90 HODGKIN LYMPHOMA, UNSPECIFIED, UNSPECIFIED SITE: ICD-10-CM

## 2022-02-22 LAB
APPEARANCE: CLEAR
BILIRUBIN URINE: NEGATIVE
BLOOD URINE: NEGATIVE
COLOR: YELLOW
GLUCOSE QUALITATIVE U: NEGATIVE
KETONES URINE: NEGATIVE
LEUKOCYTE ESTERASE URINE: NEGATIVE
NITRITE URINE: NEGATIVE
PH URINE: 5.5
PROTEIN URINE: NEGATIVE
SPECIFIC GRAVITY URINE: 1.03
UROBILINOGEN URINE: NORMAL

## 2022-02-28 ENCOUNTER — NON-APPOINTMENT (OUTPATIENT)
Age: 72
End: 2022-02-28

## 2022-03-01 ENCOUNTER — APPOINTMENT (OUTPATIENT)
Dept: ULTRASOUND IMAGING | Facility: CLINIC | Age: 72
End: 2022-03-01
Payer: MEDICARE

## 2022-03-01 ENCOUNTER — NON-APPOINTMENT (OUTPATIENT)
Age: 72
End: 2022-03-01

## 2022-03-01 ENCOUNTER — OUTPATIENT (OUTPATIENT)
Dept: OUTPATIENT SERVICES | Facility: HOSPITAL | Age: 72
LOS: 1 days | End: 2022-03-01

## 2022-03-01 DIAGNOSIS — Z98.890 OTHER SPECIFIED POSTPROCEDURAL STATES: Chronic | ICD-10-CM

## 2022-03-01 DIAGNOSIS — Z90.89 ACQUIRED ABSENCE OF OTHER ORGANS: Chronic | ICD-10-CM

## 2022-03-01 DIAGNOSIS — Z90.13 ACQUIRED ABSENCE OF BILATERAL BREASTS AND NIPPLES: Chronic | ICD-10-CM

## 2022-03-01 DIAGNOSIS — Z96.7 PRESENCE OF OTHER BONE AND TENDON IMPLANTS: Chronic | ICD-10-CM

## 2022-03-01 DIAGNOSIS — C81.90 HODGKIN LYMPHOMA, UNSPECIFIED, UNSPECIFIED SITE: ICD-10-CM

## 2022-03-01 PROCEDURE — 93971 EXTREMITY STUDY: CPT | Mod: 26,RT

## 2022-03-02 ENCOUNTER — RESULT REVIEW (OUTPATIENT)
Age: 72
End: 2022-03-02

## 2022-03-02 ENCOUNTER — OUTPATIENT (OUTPATIENT)
Dept: OUTPATIENT SERVICES | Facility: HOSPITAL | Age: 72
LOS: 1 days | Discharge: HOME | End: 2022-03-02

## 2022-03-02 DIAGNOSIS — Z90.89 ACQUIRED ABSENCE OF OTHER ORGANS: Chronic | ICD-10-CM

## 2022-03-02 DIAGNOSIS — Z96.7 PRESENCE OF OTHER BONE AND TENDON IMPLANTS: Chronic | ICD-10-CM

## 2022-03-02 DIAGNOSIS — Z98.890 OTHER SPECIFIED POSTPROCEDURAL STATES: Chronic | ICD-10-CM

## 2022-03-02 DIAGNOSIS — C81.90 HODGKIN LYMPHOMA, UNSPECIFIED, UNSPECIFIED SITE: ICD-10-CM

## 2022-03-02 DIAGNOSIS — Z90.13 ACQUIRED ABSENCE OF BILATERAL BREASTS AND NIPPLES: Chronic | ICD-10-CM

## 2022-03-03 RX ORDER — NITROFURANTOIN MONOHYDRATE/MACROCRYSTALLINE 25; 75 MG/1; MG/1
CAPSULE ORAL
Refills: 0 | Status: DISCONTINUED | COMMUNITY
End: 2022-03-03

## 2022-03-04 NOTE — PAST MEDICAL HISTORY
[Increasing age ( >40 years old)] : Increasing age ( >40 years old) [Malignancy] : Malignancy [No therapy indicated for cases scheduled for less than one hour] : No therapy indicated for cases scheduled for less than one hour.

## 2022-03-07 ENCOUNTER — RESULT REVIEW (OUTPATIENT)
Age: 72
End: 2022-03-07

## 2022-03-07 ENCOUNTER — LABORATORY RESULT (OUTPATIENT)
Age: 72
End: 2022-03-07

## 2022-03-07 ENCOUNTER — APPOINTMENT (OUTPATIENT)
Dept: HEMATOLOGY ONCOLOGY | Facility: CLINIC | Age: 72
End: 2022-03-07

## 2022-03-07 ENCOUNTER — APPOINTMENT (OUTPATIENT)
Dept: HEMATOLOGY ONCOLOGY | Facility: CLINIC | Age: 72
End: 2022-03-07
Payer: MEDICARE

## 2022-03-07 VITALS
DIASTOLIC BLOOD PRESSURE: 93 MMHG | HEIGHT: 64 IN | HEART RATE: 78 BPM | OXYGEN SATURATION: 98 % | WEIGHT: 131 LBS | SYSTOLIC BLOOD PRESSURE: 164 MMHG | BODY MASS INDEX: 22.36 KG/M2

## 2022-03-07 DIAGNOSIS — Z79.899 OTHER LONG TERM (CURRENT) DRUG THERAPY: ICD-10-CM

## 2022-03-07 DIAGNOSIS — I80.8 PHLEBITIS AND THROMBOPHLEBITIS OF OTHER SITES: ICD-10-CM

## 2022-03-07 LAB
ALBUMIN SERPL ELPH-MCNC: 4.7 G/DL
ALP BLD-CCNC: 58 U/L
ALT SERPL-CCNC: 20 U/L
ANION GAP SERPL CALC-SCNC: 13 MMOL/L
ANISOCYTOSIS BLD QL: SLIGHT — SIGNIFICANT CHANGE UP
APTT BLD: 30.1 SEC
AST SERPL-CCNC: 19 U/L
BASOPHILS # BLD AUTO: 0.1 K/UL — SIGNIFICANT CHANGE UP (ref 0–0.2)
BASOPHILS NFR BLD AUTO: 1 % — SIGNIFICANT CHANGE UP (ref 0–2)
BILIRUB SERPL-MCNC: 0.3 MG/DL
BUN SERPL-MCNC: 26 MG/DL
CALCIUM SERPL-MCNC: 9.6 MG/DL
CHLORIDE SERPL-SCNC: 107 MMOL/L
CO2 SERPL-SCNC: 24 MMOL/L
CREAT SERPL-MCNC: 0.56 MG/DL
EGFR: 98 ML/MIN/1.73M2
EOSINOPHIL # BLD AUTO: 0.1 K/UL — SIGNIFICANT CHANGE UP (ref 0–0.5)
EOSINOPHIL NFR BLD AUTO: 3 % — SIGNIFICANT CHANGE UP (ref 0–6)
GLUCOSE SERPL-MCNC: 96 MG/DL
HCT VFR BLD CALC: 41.9 % — SIGNIFICANT CHANGE UP (ref 34.5–45)
HGB BLD-MCNC: 13.3 G/DL — SIGNIFICANT CHANGE UP (ref 11.5–15.5)
HYPOCHROMIA BLD QL: SLIGHT — SIGNIFICANT CHANGE UP
INR PPP: 1.03 RATIO
LDH SERPL-CCNC: 145 U/L
LG PLATELETS BLD QL AUTO: SLIGHT — SIGNIFICANT CHANGE UP
LYMPHOCYTES # BLD AUTO: 1.5 K/UL — SIGNIFICANT CHANGE UP (ref 1–3.3)
LYMPHOCYTES # BLD AUTO: 39 % — SIGNIFICANT CHANGE UP (ref 13–44)
MACROCYTES BLD QL: SLIGHT — SIGNIFICANT CHANGE UP
MCHC RBC-ENTMCNC: 30.4 PG — SIGNIFICANT CHANGE UP (ref 27–34)
MCHC RBC-ENTMCNC: 31.8 G/DL — LOW (ref 32–36)
MCV RBC AUTO: 95.8 FL — SIGNIFICANT CHANGE UP (ref 80–100)
MICROCYTES BLD QL: SLIGHT — SIGNIFICANT CHANGE UP
MONOCYTES # BLD AUTO: 0.9 K/UL — SIGNIFICANT CHANGE UP (ref 0–0.9)
MONOCYTES NFR BLD AUTO: 16 % — HIGH (ref 2–14)
NEUTROPHILS # BLD AUTO: 1.5 K/UL — LOW (ref 1.8–7.4)
NEUTROPHILS NFR BLD AUTO: 41 % — LOW (ref 43–77)
OVALOCYTES BLD QL SMEAR: SLIGHT — SIGNIFICANT CHANGE UP
PLAT MORPH BLD: NORMAL — SIGNIFICANT CHANGE UP
PLATELET # BLD AUTO: 270 K/UL — SIGNIFICANT CHANGE UP (ref 150–400)
POIKILOCYTOSIS BLD QL AUTO: SLIGHT — SIGNIFICANT CHANGE UP
POTASSIUM SERPL-SCNC: 4.9 MMOL/L
PROT SERPL-MCNC: 7.1 G/DL
PT BLD: 11.9 SEC
RBC # BLD: 4.37 M/UL — SIGNIFICANT CHANGE UP (ref 3.8–5.2)
RBC # FLD: 14.2 % — SIGNIFICANT CHANGE UP (ref 10.3–14.5)
RBC BLD AUTO: SIGNIFICANT CHANGE UP
SODIUM SERPL-SCNC: 144 MMOL/L
STOMATOCYTES BLD QL SMEAR: PRESENT — SIGNIFICANT CHANGE UP
WBC # BLD: 4.1 K/UL — SIGNIFICANT CHANGE UP (ref 3.8–10.5)
WBC # FLD AUTO: 4.1 K/UL — SIGNIFICANT CHANGE UP (ref 3.8–10.5)

## 2022-03-07 PROCEDURE — 99214 OFFICE O/P EST MOD 30 MIN: CPT

## 2022-03-07 NOTE — CONSULT LETTER
[Dear  ___] : Dear  [unfilled], [Consult Letter:] : I had the pleasure of evaluating your patient, [unfilled]. [Please see my note below.] : Please see my note below. [Consult Closing:] : Thank you very much for allowing me to participate in the care of this patient.  If you have any questions, please do not hesitate to contact me. [Sincerely,] : Sincerely, [FreeTextEntry3] : Amita Weller MD\par Medical Oncology/Hematology\par [facility] [place], [facility]\par \par \par [facility] at [facility]\par

## 2022-03-07 NOTE — ASSESSMENT
[FreeTextEntry1] : 71 year old female with remote history of R breast cancer in 1998 s/p CMF, and bilateral DCIS s/p b/l mastectomy in 2018, now presenting with Right paravertebral soft tissue lesion at the level of T12/L1 which was discovered on imaging s/p fall in 6/2021.  She is s/p core biopsy by IR on 9/14/21 which shows - suspicious for Hodgkin's lymphoma, favoring nodular lymphocyte predominant, however additional tissue needed for a definitive diagnosis. \par S/p repeat core biopsy on 10/18/21 --> consistent with classic Hodgkin's lymphoma. She has stage I favorable disease. She deferred starting treatment until February 2022. \par 12/2/2021 ECHO LV EF 60 to 65%\par 1/28/22 PET CT with no change compared to prior PET. \par \par S/p C1 D15 AVD on 2/18/22\par Reviewed CBC from today WBC 4.1 K HGB 13.3 g HCT 41.9 %  K Neutrophil # 1.5 K \par C2 D1 delayed one week due to pending port insertion as requested by patient due to difficulty obtaining peripheral IV access on 2/18/22 and on 03/01/2022 US Duplex demonstrated Right cephalic vein superficial thrombophlebitis.\par \par Plan:\par Continue AVD (omitting Bleomycin) x 2 cycles followed by PET CT.  Depending on response, consider another 1-2 cycles followed by radiation. \par Proceed with C2 D1 on 3/11/22\par Port insertion on 3/9/22- labs and COVID-19 testing performed today \par RUE Superficial thrombophlebitis- warm compress PRN  \par Insomnia - pt defers Trazodone 50 mg q HS\par Heartburn- continue Pepcid 20 mg daily\par Nausea- can take Compazine preemptively starting day 4\par Advised to call with concerns/symptoms \par Follow up in 2 weeks with Dr Weller prior to C2 D15

## 2022-03-07 NOTE — HISTORY OF PRESENT ILLNESS
[de-identified] : Ms. Wooten is a 72 y/o patient who is being seen for suspicion of Hogdkin's Lymphoma.  \par \par Her medical history is significant for a remote history of stage I ER+ right breast cancer in 1998, s/p lumpectomy, followed by CMF x 6 months and adjuvant RT. She took raloxifene for 5 years. 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 \par She has recurrent UTIs, spinal stenosis, RA.\par \par She is s/p fall in kitchen, 06/26/21. Admitted to hospital, fractured sacrum and fractured L2 (states that L2 fracture might have previously been there).\par 6/30/21 MRI sacrum - Acute H-shaped sacral fracture. Advanced bilateral hip osteoarthritis. Small to moderate-sized bilateral hip joint effusions with synovitis.\par 6/30/21 MRI thoracic spine -Multilevel small thoracic spine disc herniations with cord impingement. No cord signal abnormality.\par Chronic L2 superior anterior corner fracture.\par Probable nondisplaced sacrococcygeal fracture with presacral edema. Dedicated bony pelvis MRI CT is recommended.\par Right paravertebral soft tissue lesion at the level of T12/L1 suggests nerve sheath tumor. Nonemergent postcontrast images are recommended.\par \par She was referred to Dr. Interiano and subsequently had a core needle biopsy of the right paravertebral soft tissue mass at T12/L1 on 9/14/21. \par Pathology - suspicious for Hodgkins' lymphoma, favor nodular lymphocyte predominant. However additional tissue is recommended for further evaluation. \par \par \par PET/CT Skull-Thigh 09/30/21: IMPRESSION: FDG-PET/CT scan:\par 1. FDG avid oval soft tissue along the right thoracic paraspinal region, corresponding to the enhancing oval mass on MRI 8/2/2021.\par 2. Linear FDG avidity in the right sacral ala, corresponding to insufficiency fracture, also noted on MRI.\par 3. Minimal stranding adjacent to the anterior surface of the left breast implant, nonspecific. Please correlate clinically.\par \par T Cell Gene Rearrangement 09/22/21: TCR-beta: Negative, TCR-gamma: Negative\par Interpretation: No discrete bands were identified in either TCR-beta or TCR-gamma, suggesting a polycional T-cell population\par \par Pathology 09/14/21: Final Diagnosis\par 1.  Paraspinal mass, right, core biopsies:\par - Suspicious for Hodgkin Lymphoma.\par  \par MRI Lumbar Spine 08/02/21: IMPRESSION:\par 1. Enhancing oval soft tissue mass is again seen along the inferior margin of the right 12th rib at the costovertebral junction, abutting the pleura, and without T12-L1 foraminal extension. This appears stable and a peripheral nerve sheath tumor may be considered as well as other processes including metastatic disease. Correlation with older imaging is suggested if available to assess stability. Consider PET CT or CT chest abdomen and pelvis for further evaluation or follow-up MRI to assess stability.\par 2. Presumed progression of the sacral insufficiency fractures with diffuse marrow edema and marrow replacement of the right sacral ala. Superimposed metastatic disease could not be absolutely excluded. CT may be helpful for further characterization or follow-up MRI to assess stability and to assess healing.\par \par Currently, taking Percocet at night for pain in the sacrum. Since 2015, has been on Gabapentin due to shingles. Also taking Methenamine for UTI. \par No fever, night sweats.Admits intermittent hot flashes. Difficulty sleeping due to pain in sacrum.\par \par PCP - Romaine Guzman \par \par  [de-identified] : Returns for follow up visit. S/p C1 D15 AVD on 2/18/22\par \par Pending port insertion on 3/9/22 \par Denies swelling, pain, erythema of right upper extremity. Has full ROM. Using warm compresses PRN\par Has fatigue, continues to care for , performs ADLs not impacting QOL\par Denies headache, dizziness\par Resolved mucositis \par Denies dyspnea, cough, fever, leg swelling \par Experienced nausea starting 2 days post treatment, used Compazine PRN for 5 days, then nausea resolved \par Reports heartburn resolved with Pecpid 20mg daily\par Denies abdominal pain, vomiting, diarrhea, constipation,  symptoms\par Good appetite \par Note improvement in insomnia, does not use Trazodone

## 2022-03-09 ENCOUNTER — APPOINTMENT (OUTPATIENT)
Dept: INTERVENTIONAL RADIOLOGY/VASCULAR | Facility: CLINIC | Age: 72
End: 2022-03-09
Payer: MEDICARE

## 2022-03-09 ENCOUNTER — OUTPATIENT (OUTPATIENT)
Dept: OUTPATIENT SERVICES | Facility: HOSPITAL | Age: 72
LOS: 1 days | End: 2022-03-09

## 2022-03-09 ENCOUNTER — NON-APPOINTMENT (OUTPATIENT)
Age: 72
End: 2022-03-09

## 2022-03-09 VITALS
HEART RATE: 74 BPM | OXYGEN SATURATION: 100 % | DIASTOLIC BLOOD PRESSURE: 87 MMHG | SYSTOLIC BLOOD PRESSURE: 152 MMHG | RESPIRATION RATE: 16 BRPM | TEMPERATURE: 98.7 F

## 2022-03-09 DIAGNOSIS — Z98.890 OTHER SPECIFIED POSTPROCEDURAL STATES: Chronic | ICD-10-CM

## 2022-03-09 DIAGNOSIS — Z90.13 ACQUIRED ABSENCE OF BILATERAL BREASTS AND NIPPLES: Chronic | ICD-10-CM

## 2022-03-09 DIAGNOSIS — Z90.89 ACQUIRED ABSENCE OF OTHER ORGANS: Chronic | ICD-10-CM

## 2022-03-09 DIAGNOSIS — Z96.7 PRESENCE OF OTHER BONE AND TENDON IMPLANTS: Chronic | ICD-10-CM

## 2022-03-09 DIAGNOSIS — Z00.8 ENCOUNTER FOR OTHER GENERAL EXAMINATION: ICD-10-CM

## 2022-03-09 PROCEDURE — 76937 US GUIDE VASCULAR ACCESS: CPT | Mod: 26

## 2022-03-09 PROCEDURE — 77001 FLUOROGUIDE FOR VEIN DEVICE: CPT | Mod: 26

## 2022-03-09 PROCEDURE — 36561 INSERT TUNNELED CV CATH: CPT

## 2022-03-10 NOTE — HISTORY OF PRESENT ILLNESS
[FreeTextEntry1] : PRE CALL PRIOR TO APPOINTMENT: \par \par Phone Number: [phone]\par \par Diagnosis: Hodgkin's Lymphoma\par \par COVID-19 SWAB: advised\par \par RX on file: yes\par \par Referring MD: Nithin Levi\par \par Appointment date: 3/9/2022 \par \par Currently on Chemotherapy:  yes           \par \par CBC within 48 hours:  WBC: 4.4      ANC: 1.5 s/w Twila [place] who will get approval from Dr. Weller \par \par Diabetic: no\par \par Clotting or Bleeding disorders: no\par \par PPM / Defibrillator: no\par \par NPO status advised: yes\par \par History of fall:    yes\par \par Assistant device for walking: no\par \par  home: friend\par \par Blood Thinners: no\par \par Prescribing MD agree to have medication held: n/a\par \par Capacity to make decisions:\par \par HCP: no\par \par DNR: no\par \par Person contact for Pre-Call: Spoke with pt by \par \par \par \par \par \par Guidelines for Screening Anesthesia / Sedation Cases	(TYPE YES OR NO) \par  \par Obtain Prescription / Authorization from Referring Physician: YES	\par \par Medical Necessity (Justification of the need for Anesthesia / Sedation) from referring Physician: YES	\par \par Have you taken oral sedation? (e.g. Xanax, Valium, and other oral sedatives):  NO	\par \par Clearance to receive Anesthesia / Sedation: YES- BY Dr. Wang 	\par \par \par \par ANESTHESIA EXCLUSION CRITERIA QUESTIONNAIRE:	\par 	\par Difficult Airway: (TYPE YES OR NO) 	\par          \par Previous Problem with Anesthesia or sedation: NO	\par \par History of Difficult Intubation: NO	\par \par Stridor, Snoring, Sleep Apnea: snoring	\par \par Tonsils / Adenoids present: no	\par \par Dysmorphic Facial Features: NO	\par \par Cervical Spine Fusion/ Cervical Spine Surgery: NO	\par \par Tumor in Airway: NO	\par \par Trauma to Airway: NO	\par \par Radiation therapy to head / neck: NO	\par \par Advanced Rheumatoid ArthritiS: NO	\par \par Active Cardiac Ischemia (as defined by EKG or Laboratory  Examination): NO	\par \par  Severe COPD / Home O2: NO	\par \par  Known or Suspected Increased Intracranial pressure: NO	\par \par Patient with BMI of 40 or greater : NO    Weight (kgs.) 61_____ Height (ft.) _5'4''_____       BMI_______	 	\par  	\par Patients with Increased Risk of Aspiration: (TYPE YES OR NO) 		\par \par Abnormal Airway: NO	\par \par Hiatal Hernia with Reflux: NO	\par           \par Pregnancy: NO	 	\par           \par Altered Mental State: NO 	 	\par           \par Spinal Cord Injury with Paraplegia or Quadriplegia: NO	 	\par        \par History of delayed Gastric Emptying: NO 	 	\par          \par  ASA Physical Status of 3 or greater (See Appendix 1 from policy ANSL.5502) 	 	\par  	\par \par Malignant Hyperthermia Screening: (TYPE YES OR NO) 	\par           \par Family history unexpected death following anesthesia or exercise: NO	\par          \par  Family or personal history of Malignant Hyperthermia: NO  	\par          \par  A muscle or neuromuscular disorder: NO \par \par Malignant Hyperthermia Screening\par \par Does the patient have a history of any of the following (Type YES or NO)\par \par Malignant hyperthermia: NO\par \par A muscle or neuromuscular disorder: NO \par \par \par Personal history of: (Type YES or NO)\par \par Muscle spasm: NO\par \par Dark or chocolate-colored urine: NO\par \par Unanticipated fever immediately following anesthesia or exercise: NO	\par \par \par \par Monique -Operative Assessment (Day of Procedure) \par \par Procedure: Mediport \par \par Indication: Hodgkins Lymphoma\par \par NPO status: 10p\par \par Accompanied by: Illyse/friends [phone]\par \par Falls risk: yes\par \par IVL: \par \par IR MD: Dr. Nancy Davis \par \par \par \par Urine Pregnancy: n/a\par \par Pre-Op instructions: yes\par \par POST-OP teaching initiated: yes\par \par Allergy bracelet on: latex\par \par Anesthesia plan discussed with IR MD: yes \par \par Antibiotic given:\par

## 2022-03-11 ENCOUNTER — RESULT REVIEW (OUTPATIENT)
Age: 72
End: 2022-03-11

## 2022-03-11 ENCOUNTER — APPOINTMENT (OUTPATIENT)
Age: 72
End: 2022-03-11

## 2022-03-11 LAB
BASOPHILS # BLD AUTO: 0.1 K/UL — SIGNIFICANT CHANGE UP (ref 0–0.2)
BASOPHILS NFR BLD AUTO: 1.2 % — SIGNIFICANT CHANGE UP (ref 0–2)
EOSINOPHIL # BLD AUTO: 0.2 K/UL — SIGNIFICANT CHANGE UP (ref 0–0.5)
EOSINOPHIL NFR BLD AUTO: 1.9 % — SIGNIFICANT CHANGE UP (ref 0–6)
HCT VFR BLD CALC: 40.6 % — SIGNIFICANT CHANGE UP (ref 34.5–45)
HGB BLD-MCNC: 13 G/DL — SIGNIFICANT CHANGE UP (ref 11.5–15.5)
LYMPHOCYTES # BLD AUTO: 1.7 K/UL — SIGNIFICANT CHANGE UP (ref 1–3.3)
LYMPHOCYTES # BLD AUTO: 18.3 % — SIGNIFICANT CHANGE UP (ref 13–44)
MCHC RBC-ENTMCNC: 30.6 PG — SIGNIFICANT CHANGE UP (ref 27–34)
MCHC RBC-ENTMCNC: 32 G/DL — SIGNIFICANT CHANGE UP (ref 32–36)
MCV RBC AUTO: 95.7 FL — SIGNIFICANT CHANGE UP (ref 80–100)
MONOCYTES # BLD AUTO: 1.2 K/UL — HIGH (ref 0–0.9)
MONOCYTES NFR BLD AUTO: 12.7 % — SIGNIFICANT CHANGE UP (ref 2–14)
NEUTROPHILS # BLD AUTO: 6 K/UL — SIGNIFICANT CHANGE UP (ref 1.8–7.4)
NEUTROPHILS NFR BLD AUTO: 65.9 % — SIGNIFICANT CHANGE UP (ref 43–77)
PLATELET # BLD AUTO: 250 K/UL — SIGNIFICANT CHANGE UP (ref 150–400)
RBC # BLD: 4.24 M/UL — SIGNIFICANT CHANGE UP (ref 3.8–5.2)
RBC # FLD: 13.6 % — SIGNIFICANT CHANGE UP (ref 10.3–14.5)
WBC # BLD: 9.1 K/UL — SIGNIFICANT CHANGE UP (ref 3.8–10.5)
WBC # FLD AUTO: 9.1 K/UL — SIGNIFICANT CHANGE UP (ref 3.8–10.5)

## 2022-03-12 LAB
ALBUMIN SERPL ELPH-MCNC: 4.6 G/DL — SIGNIFICANT CHANGE UP (ref 3.3–5)
ALP SERPL-CCNC: 56 U/L — SIGNIFICANT CHANGE UP (ref 40–120)
ALT FLD-CCNC: 14 U/L — SIGNIFICANT CHANGE UP (ref 10–45)
ANION GAP SERPL CALC-SCNC: 12 MMOL/L — SIGNIFICANT CHANGE UP (ref 5–17)
AST SERPL-CCNC: 24 U/L — SIGNIFICANT CHANGE UP (ref 10–40)
BILIRUB SERPL-MCNC: 0.3 MG/DL — SIGNIFICANT CHANGE UP (ref 0.2–1.2)
BUN SERPL-MCNC: 26 MG/DL — HIGH (ref 7–23)
CALCIUM SERPL-MCNC: 9.4 MG/DL — SIGNIFICANT CHANGE UP (ref 8.4–10.5)
CHLORIDE SERPL-SCNC: 105 MMOL/L — SIGNIFICANT CHANGE UP (ref 96–108)
CO2 SERPL-SCNC: 21 MMOL/L — LOW (ref 22–31)
CREAT SERPL-MCNC: 0.44 MG/DL — LOW (ref 0.5–1.3)
EGFR: 104 ML/MIN/1.73M2 — SIGNIFICANT CHANGE UP
ERYTHROCYTE [SEDIMENTATION RATE] IN BLOOD: 36 MM/HR — HIGH (ref 0–20)
GLUCOSE SERPL-MCNC: 83 MG/DL — SIGNIFICANT CHANGE UP (ref 70–99)
LDH SERPL L TO P-CCNC: 296 U/L — HIGH (ref 50–242)
POTASSIUM SERPL-MCNC: 4.2 MMOL/L — SIGNIFICANT CHANGE UP (ref 3.5–5.3)
POTASSIUM SERPL-SCNC: 4.2 MMOL/L — SIGNIFICANT CHANGE UP (ref 3.5–5.3)
PROT SERPL-MCNC: 7.3 G/DL — SIGNIFICANT CHANGE UP (ref 6–8.3)
SODIUM SERPL-SCNC: 138 MMOL/L — SIGNIFICANT CHANGE UP (ref 135–145)

## 2022-03-14 DIAGNOSIS — Z51.11 ENCOUNTER FOR ANTINEOPLASTIC CHEMOTHERAPY: ICD-10-CM

## 2022-03-14 DIAGNOSIS — R11.2 NAUSEA WITH VOMITING, UNSPECIFIED: ICD-10-CM

## 2022-03-22 ENCOUNTER — APPOINTMENT (OUTPATIENT)
Dept: HEMATOLOGY ONCOLOGY | Facility: CLINIC | Age: 72
End: 2022-03-22

## 2022-03-22 ENCOUNTER — APPOINTMENT (OUTPATIENT)
Dept: HEMATOLOGY ONCOLOGY | Facility: CLINIC | Age: 72
End: 2022-03-22
Payer: MEDICARE

## 2022-03-22 PROCEDURE — 99214 OFFICE O/P EST MOD 30 MIN: CPT | Mod: 95

## 2022-03-22 NOTE — HISTORY OF PRESENT ILLNESS
[de-identified] : Ms. Wooten is a 72 y/o patient who is being seen for suspicion of Hogdkin's Lymphoma.  \par \par Her medical history is significant for a remote history of stage I ER+ right breast cancer in 1998, s/p lumpectomy, followed by CMF x 6 months and adjuvant RT. She took raloxifene for 5 years. 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 \par She has recurrent UTIs, spinal stenosis, RA.\par \par She is s/p fall in kitchen, 06/26/21. Admitted to hospital, fractured sacrum and fractured L2 (states that L2 fracture might have previously been there).\par 6/30/21 MRI sacrum - Acute H-shaped sacral fracture. Advanced bilateral hip osteoarthritis. Small to moderate-sized bilateral hip joint effusions with synovitis.\par 6/30/21 MRI thoracic spine -Multilevel small thoracic spine disc herniations with cord impingement. No cord signal abnormality.\par Chronic L2 superior anterior corner fracture.\par Probable nondisplaced sacrococcygeal fracture with presacral edema. Dedicated bony pelvis MRI CT is recommended.\par Right paravertebral soft tissue lesion at the level of T12/L1 suggests nerve sheath tumor. Nonemergent postcontrast images are recommended.\par \par She was referred to Dr. Interiano and subsequently had a core needle biopsy of the right paravertebral soft tissue mass at T12/L1 on 9/14/21. \par Pathology - suspicious for Hodgkins' lymphoma, favor nodular lymphocyte predominant. However additional tissue is recommended for further evaluation. \par \par \par PET/CT Skull-Thigh 09/30/21: IMPRESSION: FDG-PET/CT scan:\par 1. FDG avid oval soft tissue along the right thoracic paraspinal region, corresponding to the enhancing oval mass on MRI 8/2/2021.\par 2. Linear FDG avidity in the right sacral ala, corresponding to insufficiency fracture, also noted on MRI.\par 3. Minimal stranding adjacent to the anterior surface of the left breast implant, nonspecific. Please correlate clinically.\par \par T Cell Gene Rearrangement 09/22/21: TCR-beta: Negative, TCR-gamma: Negative\par Interpretation: No discrete bands were identified in either TCR-beta or TCR-gamma, suggesting a polycional T-cell population\par \par Pathology 09/14/21: Final Diagnosis\par 1.  Paraspinal mass, right, core biopsies:\par - Suspicious for Hodgkin Lymphoma.\par  \par MRI Lumbar Spine 08/02/21: IMPRESSION:\par 1. Enhancing oval soft tissue mass is again seen along the inferior margin of the right 12th rib at the costovertebral junction, abutting the pleura, and without T12-L1 foraminal extension. This appears stable and a peripheral nerve sheath tumor may be considered as well as other processes including metastatic disease. Correlation with older imaging is suggested if available to assess stability. Consider PET CT or CT chest abdomen and pelvis for further evaluation or follow-up MRI to assess stability.\par 2. Presumed progression of the sacral insufficiency fractures with diffuse marrow edema and marrow replacement of the right sacral ala. Superimposed metastatic disease could not be absolutely excluded. CT may be helpful for further characterization or follow-up MRI to assess stability and to assess healing.\par \par Currently, taking Percocet at night for pain in the sacrum. Since 2015, has been on Gabapentin due to shingles. Also taking Methenamine for UTI. \par No fever, night sweats.Admits intermittent hot flashes. Difficulty sleeping due to pain in sacrum.\par \par PCP - Romaine Guzman \par \par  [Home] : at home, [unfilled] , at the time of the visit. [Medical Office: ([facility] [place]___] : at the medical office located in  [Verbal consent obtained from patient] : the patient, [unfilled] [de-identified] : Verbal consent given by patient to conduct this telehealth visit via audio-video conferencing.\par Returns for follow up visit. Currently on C2 D12 on 3/11/22\par S/p Port insertion on 3/09/22. \par Fatigue. Notes nausea three days after treatment. \par Heartburn taking Pepcid with relief \par Difficulty sleeping \par

## 2022-03-22 NOTE — ASSESSMENT
[FreeTextEntry1] : 71 year old female with remote history of R breast cancer in 1998 s/p CMF, and bilateral DCIS s/p b/l mastectomy in 2018, now presenting with Right paravertebral soft tissue lesion at the level of T12/L1 which was discovered on imaging s/p fall in 6/2021.  She is s/p core biopsy by IR on 9/14/21 which shows - suspicious for Hodgkin's lymphoma, favoring nodular lymphocyte predominant, however additional tissue needed for a definitive diagnosis. \par S/p repeat core biopsy on 10/18/21 --> consistent with classic Hodgkin's lymphoma. She has stage I favorable disease. She deferred starting treatment until February 2022. \par 12/2/2021 ECHO LV EF 60 to 65%\par \par Reviewed 1/28/22 PET CT with no change compared to prior PET. \par Discussed standard of care ABVD x 2 cycles followed by PET CT.  Depending on response, consider another 1-2 cycles followed by radiation. Side effects of chemotherapy reviewed include but not limited to allergic reaction, fatigue, nausea, vomiting, myelosuppression, increase risk of infection, leukopenia, liver dysfunction, neuropathy, rash, lymphopenia, alopecia, cardiotoxicity and increase risk of secondary leukemia.  Discussed omitting bleomycin given her age and risk for toxicity. 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  She's has agreed to AVD. \par \par Currently on C2 D12 AVD 3/11/2022: Experiences fatigue and nausea 3 days after treatment, overall tolerating well. Reviewed CBC from 3/11/2022: Normal \par \par Plan:\par Proceed with C2D15 AVD on 3/25/22. PET CT on 4/1/22.  Depending on response, consider another 1-2 cycles followed by radiation. \par Heartburn - Continue Pepcid 20 mg q.d. \par Follow up after scans

## 2022-03-22 NOTE — CONSULT LETTER
[Dear  ___] : Dear  [unfilled], [Consult Letter:] : I had the pleasure of evaluating your patient, [unfilled]. [Please see my note below.] : Please see my note below. [Consult Closing:] : Thank you very much for allowing me to participate in the care of this patient.  If you have any questions, please do not hesitate to contact me. [Sincerely,] : Sincerely, [FreeTextEntry3] : Amita Weller MD\par Medical Oncology/Hematology\par [facility] [place], [facility]\par \par \par [facility] [employer] at [facility]\par

## 2022-03-22 NOTE — ADDENDUM
[FreeTextEntry1] : Documented by Arnaldo Aguiar acting as scribe for Dr. Weller on 03/22/2022. \par \par All Medical record entries made by the Scribe were at my, Dr. Weller's, direction and personally dictated by me on 03/22/2022. I have reviewed the chart and agree that the record accurately reflects my personal performance of the history, physical exam, assessment and plan. I have also personally directed, reviewed, and agreed with the discharge instructions.

## 2022-03-25 ENCOUNTER — RESULT REVIEW (OUTPATIENT)
Age: 72
End: 2022-03-25

## 2022-03-25 ENCOUNTER — APPOINTMENT (OUTPATIENT)
Age: 72
End: 2022-03-25

## 2022-03-25 ENCOUNTER — LABORATORY RESULT (OUTPATIENT)
Age: 72
End: 2022-03-25

## 2022-03-25 LAB
ALBUMIN SERPL ELPH-MCNC: 4.3 G/DL — SIGNIFICANT CHANGE UP (ref 3.3–5)
ALP SERPL-CCNC: 58 U/L — SIGNIFICANT CHANGE UP (ref 40–120)
ALT FLD-CCNC: 16 U/L — SIGNIFICANT CHANGE UP (ref 10–45)
ANION GAP SERPL CALC-SCNC: 14 MMOL/L — SIGNIFICANT CHANGE UP (ref 5–17)
AST SERPL-CCNC: 26 U/L — SIGNIFICANT CHANGE UP (ref 10–40)
BASOPHILS # BLD AUTO: 0.1 K/UL — SIGNIFICANT CHANGE UP (ref 0–0.2)
BASOPHILS NFR BLD AUTO: 1.6 % — SIGNIFICANT CHANGE UP (ref 0–2)
BILIRUB SERPL-MCNC: 0.4 MG/DL — SIGNIFICANT CHANGE UP (ref 0.2–1.2)
BUN SERPL-MCNC: 26 MG/DL — HIGH (ref 7–23)
CALCIUM SERPL-MCNC: 9.4 MG/DL — SIGNIFICANT CHANGE UP (ref 8.4–10.5)
CHLORIDE SERPL-SCNC: 105 MMOL/L — SIGNIFICANT CHANGE UP (ref 96–108)
CO2 SERPL-SCNC: 20 MMOL/L — LOW (ref 22–31)
CREAT SERPL-MCNC: 0.5 MG/DL — SIGNIFICANT CHANGE UP (ref 0.5–1.3)
EGFR: 101 ML/MIN/1.73M2 — SIGNIFICANT CHANGE UP
EOSINOPHIL # BLD AUTO: 0.3 K/UL — SIGNIFICANT CHANGE UP (ref 0–0.5)
EOSINOPHIL NFR BLD AUTO: 5.5 % — SIGNIFICANT CHANGE UP (ref 0–6)
GLUCOSE SERPL-MCNC: 92 MG/DL — SIGNIFICANT CHANGE UP (ref 70–99)
HCT VFR BLD CALC: 39.9 % — SIGNIFICANT CHANGE UP (ref 34.5–45)
HGB BLD-MCNC: 12.7 G/DL — SIGNIFICANT CHANGE UP (ref 11.5–15.5)
LDH SERPL L TO P-CCNC: 226 U/L — SIGNIFICANT CHANGE UP (ref 50–242)
LYMPHOCYTES # BLD AUTO: 1.2 K/UL — SIGNIFICANT CHANGE UP (ref 1–3.3)
LYMPHOCYTES # BLD AUTO: 25.6 % — SIGNIFICANT CHANGE UP (ref 13–44)
MCHC RBC-ENTMCNC: 29.9 PG — SIGNIFICANT CHANGE UP (ref 27–34)
MCHC RBC-ENTMCNC: 31.8 G/DL — LOW (ref 32–36)
MCV RBC AUTO: 94 FL — SIGNIFICANT CHANGE UP (ref 80–100)
MONOCYTES # BLD AUTO: 0.8 K/UL — SIGNIFICANT CHANGE UP (ref 0–0.9)
MONOCYTES NFR BLD AUTO: 16.5 % — HIGH (ref 2–14)
NEUTROPHILS # BLD AUTO: 2.4 K/UL — SIGNIFICANT CHANGE UP (ref 1.8–7.4)
NEUTROPHILS NFR BLD AUTO: 50.8 % — SIGNIFICANT CHANGE UP (ref 43–77)
PLATELET # BLD AUTO: 239 K/UL — SIGNIFICANT CHANGE UP (ref 150–400)
POTASSIUM SERPL-MCNC: 4.2 MMOL/L — SIGNIFICANT CHANGE UP (ref 3.5–5.3)
POTASSIUM SERPL-SCNC: 4.2 MMOL/L — SIGNIFICANT CHANGE UP (ref 3.5–5.3)
PROT SERPL-MCNC: 7.2 G/DL — SIGNIFICANT CHANGE UP (ref 6–8.3)
RBC # BLD: 4.24 M/UL — SIGNIFICANT CHANGE UP (ref 3.8–5.2)
RBC # FLD: 14 % — SIGNIFICANT CHANGE UP (ref 10.3–14.5)
SODIUM SERPL-SCNC: 139 MMOL/L — SIGNIFICANT CHANGE UP (ref 135–145)
WBC # BLD: 4.7 K/UL — SIGNIFICANT CHANGE UP (ref 3.8–10.5)
WBC # FLD AUTO: 4.7 K/UL — SIGNIFICANT CHANGE UP (ref 3.8–10.5)

## 2022-04-06 ENCOUNTER — APPOINTMENT (OUTPATIENT)
Dept: NUCLEAR MEDICINE | Facility: CLINIC | Age: 72
End: 2022-04-06

## 2022-04-06 ENCOUNTER — OUTPATIENT (OUTPATIENT)
Dept: OUTPATIENT SERVICES | Facility: HOSPITAL | Age: 72
LOS: 1 days | End: 2022-04-06

## 2022-04-06 ENCOUNTER — APPOINTMENT (OUTPATIENT)
Dept: NUCLEAR MEDICINE | Facility: CLINIC | Age: 72
End: 2022-04-06
Payer: MEDICARE

## 2022-04-06 DIAGNOSIS — Z98.890 OTHER SPECIFIED POSTPROCEDURAL STATES: Chronic | ICD-10-CM

## 2022-04-06 DIAGNOSIS — C81.90 HODGKIN LYMPHOMA, UNSPECIFIED, UNSPECIFIED SITE: ICD-10-CM

## 2022-04-06 DIAGNOSIS — Z90.13 ACQUIRED ABSENCE OF BILATERAL BREASTS AND NIPPLES: Chronic | ICD-10-CM

## 2022-04-06 DIAGNOSIS — Z90.89 ACQUIRED ABSENCE OF OTHER ORGANS: Chronic | ICD-10-CM

## 2022-04-06 DIAGNOSIS — Z96.7 PRESENCE OF OTHER BONE AND TENDON IMPLANTS: Chronic | ICD-10-CM

## 2022-04-06 PROCEDURE — 78815 PET IMAGE W/CT SKULL-THIGH: CPT | Mod: 26,PS

## 2022-04-07 ENCOUNTER — OUTPATIENT (OUTPATIENT)
Dept: OUTPATIENT SERVICES | Facility: HOSPITAL | Age: 72
LOS: 1 days | Discharge: HOME | End: 2022-04-07

## 2022-04-07 DIAGNOSIS — Z90.13 ACQUIRED ABSENCE OF BILATERAL BREASTS AND NIPPLES: Chronic | ICD-10-CM

## 2022-04-07 DIAGNOSIS — Z98.890 OTHER SPECIFIED POSTPROCEDURAL STATES: Chronic | ICD-10-CM

## 2022-04-07 DIAGNOSIS — C81.90 HODGKIN LYMPHOMA, UNSPECIFIED, UNSPECIFIED SITE: ICD-10-CM

## 2022-04-07 DIAGNOSIS — Z90.89 ACQUIRED ABSENCE OF OTHER ORGANS: Chronic | ICD-10-CM

## 2022-04-07 DIAGNOSIS — Z96.7 PRESENCE OF OTHER BONE AND TENDON IMPLANTS: Chronic | ICD-10-CM

## 2022-04-11 ENCOUNTER — APPOINTMENT (OUTPATIENT)
Dept: HEMATOLOGY ONCOLOGY | Facility: CLINIC | Age: 72
End: 2022-04-11
Payer: MEDICARE

## 2022-04-11 PROCEDURE — 99446 NTRPROF PH1/NTRNET/EHR 5-10: CPT

## 2022-04-11 NOTE — ASSESSMENT
[FreeTextEntry1] : 71 year old female with remote history of R breast cancer in 1998 s/p CMF, and bilateral DCIS s/p b/l mastectomy in 2018, now presenting with Right paravertebral soft tissue lesion at the level of T12/L1 which was discovered on imaging s/p fall in 6/2021.  She is s/p core biopsy by IR on 9/14/21 which shows - suspicious for Hodgkin's lymphoma, favoring nodular lymphocyte predominant, however additional tissue needed for a definitive diagnosis. \par S/p repeat core biopsy on 10/18/21 --> consistent with classic Hodgkin's lymphoma. She has stage I favorable disease. She deferred starting treatment until February 2022. \par 12/2/2021 ECHO LV EF 60 to 65%\par 1/28/22 PET CT with no change compared to prior PET. \par Discussed standard of care ABVD x 2 cycles followed by PET CT.   Discussed omitting bleomycin given her age and risk for toxicity. 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  She agreed to AVD. \par \par S/p C2 D15 AVD on 3/25/22 \par Reviewed 04/06/2022 PET/CT\par 1. Resolution of FDG avid oval soft tissue along the right lower thoracic paraspinal region, costovertebral junction of 12th rib.\par 2. Resolution of minimal linear bilateral sacral FDG avidity.\par 3. Unchanged, nonspecific linear minimally FDG avid stranding along the anterior surface of the left breast implant.\par 4. No new FDG avid focus.\par \par Plan:\par Complete response on PET after 2 cycles of AVD, discussed continuing AVD for additional 2 cycles, or alternative is ISRT +/- chemotherapy. She is comfortable with proceeding with AVD x 2 cycles alone. \par Heartburn - Continue Pepcid 20 mg q.d. \par Follow up in 1 month with NP\par \par time on phone: 5 minutes

## 2022-04-11 NOTE — CONSULT LETTER
[Dear  ___] : Dear  [unfilled], [Consult Letter:] : I had the pleasure of evaluating your patient, [unfilled]. [Please see my note below.] : Please see my note below. [Consult Closing:] : Thank you very much for allowing me to participate in the care of this patient.  If you have any questions, please do not hesitate to contact me. [Sincerely,] : Sincerely, [FreeTextEntry3] : Amita Weller MD\par Medical Oncology/Hematology\par [facility] [place], [facility]\par \par \par [facility] [employer] at [facility]\par

## 2022-04-11 NOTE — HISTORY OF PRESENT ILLNESS
[Home] : at home, [unfilled] , at the time of the visit. [Medical Office: ([facility])___] : at the medical office located in  [Verbal consent obtained from patient] : the patient, [unfilled] [de-identified] : Ms. Wooten is a 70 y/o patient who is being seen for suspicion of Hogdkin's Lymphoma.  \par \par Her medical history is significant for a remote history of stage I ER+ right breast cancer in 1998, s/p lumpectomy, followed by CMF x 6 months and adjuvant RT. She took raloxifene for 5 years. 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 \par She has recurrent UTIs, spinal stenosis, RA.\par \par She is s/p fall in kitchen, 06/26/21. Admitted to hospital, fractured sacrum and fractured L2 (states that L2 fracture might have previously been there).\par 6/30/21 MRI sacrum - Acute H-shaped sacral fracture. Advanced bilateral hip osteoarthritis. Small to moderate-sized bilateral hip joint effusions with synovitis.\par 6/30/21 MRI thoracic spine -Multilevel small thoracic spine disc herniations with cord impingement. No cord signal abnormality.\par Chronic L2 superior anterior corner fracture.\par Probable nondisplaced sacrococcygeal fracture with presacral edema. Dedicated bony pelvis MRI CT is recommended.\par Right paravertebral soft tissue lesion at the level of T12/L1 suggests nerve sheath tumor. Nonemergent postcontrast images are recommended.\par \par She was referred to Dr. Interiano and subsequently had a core needle biopsy of the right paravertebral soft tissue mass at T12/L1 on 9/14/21. \par Pathology - suspicious for Hodgkins' lymphoma, favor nodular lymphocyte predominant. However additional tissue is recommended for further evaluation. \par \par \par PET/CT Skull-Thigh 09/30/21: IMPRESSION: FDG-PET/CT scan:\par 1. FDG avid oval soft tissue along the right thoracic paraspinal region, corresponding to the enhancing oval mass on MRI 8/2/2021.\par 2. Linear FDG avidity in the right sacral ala, corresponding to insufficiency fracture, also noted on MRI.\par 3. Minimal stranding adjacent to the anterior surface of the left breast implant, nonspecific. Please correlate clinically.\par \par T Cell Gene Rearrangement 09/22/21: TCR-beta: Negative, TCR-gamma: Negative\par Interpretation: No discrete bands were identified in either TCR-beta or TCR-gamma, suggesting a polycional T-cell population\par \par Pathology 09/14/21: Final Diagnosis\par 1.  Paraspinal mass, right, core biopsies:\par - Suspicious for Hodgkin Lymphoma.\par  \par MRI Lumbar Spine 08/02/21: IMPRESSION:\par 1. Enhancing oval soft tissue mass is again seen along the inferior margin of the right 12th rib at the costovertebral junction, abutting the pleura, and without T12-L1 foraminal extension. This appears stable and a peripheral nerve sheath tumor may be considered as well as other processes including metastatic disease. Correlation with older imaging is suggested if available to assess stability. Consider PET CT or CT chest abdomen and pelvis for further evaluation or follow-up MRI to assess stability.\par 2. Presumed progression of the sacral insufficiency fractures with diffuse marrow edema and marrow replacement of the right sacral ala. Superimposed metastatic disease could not be absolutely excluded. CT may be helpful for further characterization or follow-up MRI to assess stability and to assess healing.\par \par Currently, taking Percocet at night for pain in the sacrum. Since 2015, has been on Gabapentin due to shingles. Also taking Methenamine for UTI. \par No fever, night sweats.Admits intermittent hot flashes. Difficulty sleeping due to pain in sacrum.\par \par PCP - Romaine Guzman \par \par  [de-identified] : Verbal consent given by patient to conduct this telephone visit.\par Returns for follow up visit. S/p C2 AVD D15 on 3/25/22 \par Feels well overall but does feel unwell for 5 days post chemo, needs to take antiemetics\par Also had constipation that lasted 8 days\par Now doing better.\par no fever.

## 2022-04-14 ENCOUNTER — RESULT REVIEW (OUTPATIENT)
Age: 72
End: 2022-04-14

## 2022-04-14 ENCOUNTER — LABORATORY RESULT (OUTPATIENT)
Age: 72
End: 2022-04-14

## 2022-04-14 ENCOUNTER — APPOINTMENT (OUTPATIENT)
Age: 72
End: 2022-04-14

## 2022-04-14 DIAGNOSIS — R11.2 NAUSEA WITH VOMITING, UNSPECIFIED: ICD-10-CM

## 2022-04-14 DIAGNOSIS — Z51.11 ENCOUNTER FOR ANTINEOPLASTIC CHEMOTHERAPY: ICD-10-CM

## 2022-04-14 LAB
ALBUMIN SERPL ELPH-MCNC: 4.4 G/DL — SIGNIFICANT CHANGE UP (ref 3.3–5)
ALP SERPL-CCNC: 55 U/L — SIGNIFICANT CHANGE UP (ref 40–120)
ALT FLD-CCNC: 17 U/L — SIGNIFICANT CHANGE UP (ref 10–45)
ANION GAP SERPL CALC-SCNC: 11 MMOL/L — SIGNIFICANT CHANGE UP (ref 5–17)
AST SERPL-CCNC: 24 U/L — SIGNIFICANT CHANGE UP (ref 10–40)
BASOPHILS # BLD AUTO: 0.1 K/UL — SIGNIFICANT CHANGE UP (ref 0–0.2)
BASOPHILS NFR BLD AUTO: 1.7 % — SIGNIFICANT CHANGE UP (ref 0–2)
BILIRUB SERPL-MCNC: 0.3 MG/DL — SIGNIFICANT CHANGE UP (ref 0.2–1.2)
BUN SERPL-MCNC: 22 MG/DL — SIGNIFICANT CHANGE UP (ref 7–23)
CALCIUM SERPL-MCNC: 9.7 MG/DL — SIGNIFICANT CHANGE UP (ref 8.4–10.5)
CHLORIDE SERPL-SCNC: 105 MMOL/L — SIGNIFICANT CHANGE UP (ref 96–108)
CO2 SERPL-SCNC: 23 MMOL/L — SIGNIFICANT CHANGE UP (ref 22–31)
CREAT SERPL-MCNC: 0.48 MG/DL — LOW (ref 0.5–1.3)
EGFR: 102 ML/MIN/1.73M2 — SIGNIFICANT CHANGE UP
EOSINOPHIL # BLD AUTO: 0.2 K/UL — SIGNIFICANT CHANGE UP (ref 0–0.5)
EOSINOPHIL NFR BLD AUTO: 2.8 % — SIGNIFICANT CHANGE UP (ref 0–6)
GLUCOSE SERPL-MCNC: 93 MG/DL — SIGNIFICANT CHANGE UP (ref 70–99)
HCT VFR BLD CALC: 41.8 % — SIGNIFICANT CHANGE UP (ref 34.5–45)
HGB BLD-MCNC: 13.1 G/DL — SIGNIFICANT CHANGE UP (ref 11.5–15.5)
LDH SERPL L TO P-CCNC: 258 U/L — HIGH (ref 50–242)
LYMPHOCYTES # BLD AUTO: 1.7 K/UL — SIGNIFICANT CHANGE UP (ref 1–3.3)
LYMPHOCYTES # BLD AUTO: 26.4 % — SIGNIFICANT CHANGE UP (ref 13–44)
MCHC RBC-ENTMCNC: 30.1 PG — SIGNIFICANT CHANGE UP (ref 27–34)
MCHC RBC-ENTMCNC: 31.3 G/DL — LOW (ref 32–36)
MCV RBC AUTO: 96.1 FL — SIGNIFICANT CHANGE UP (ref 80–100)
MONOCYTES # BLD AUTO: 1 K/UL — HIGH (ref 0–0.9)
MONOCYTES NFR BLD AUTO: 16.4 % — HIGH (ref 2–14)
NEUTROPHILS # BLD AUTO: 3.3 K/UL — SIGNIFICANT CHANGE UP (ref 1.8–7.4)
NEUTROPHILS NFR BLD AUTO: 52.7 % — SIGNIFICANT CHANGE UP (ref 43–77)
PLATELET # BLD AUTO: 264 K/UL — SIGNIFICANT CHANGE UP (ref 150–400)
POTASSIUM SERPL-MCNC: 4.3 MMOL/L — SIGNIFICANT CHANGE UP (ref 3.5–5.3)
POTASSIUM SERPL-SCNC: 4.3 MMOL/L — SIGNIFICANT CHANGE UP (ref 3.5–5.3)
PROT SERPL-MCNC: 7.2 G/DL — SIGNIFICANT CHANGE UP (ref 6–8.3)
RBC # BLD: 4.34 M/UL — SIGNIFICANT CHANGE UP (ref 3.8–5.2)
RBC # FLD: 14.5 % — SIGNIFICANT CHANGE UP (ref 10.3–14.5)
SODIUM SERPL-SCNC: 140 MMOL/L — SIGNIFICANT CHANGE UP (ref 135–145)
WBC # BLD: 6.3 K/UL — SIGNIFICANT CHANGE UP (ref 3.8–10.5)
WBC # FLD AUTO: 6.3 K/UL — SIGNIFICANT CHANGE UP (ref 3.8–10.5)

## 2022-04-29 ENCOUNTER — RESULT REVIEW (OUTPATIENT)
Age: 72
End: 2022-04-29

## 2022-04-29 ENCOUNTER — APPOINTMENT (OUTPATIENT)
Age: 72
End: 2022-04-29

## 2022-04-29 LAB
ALBUMIN SERPL ELPH-MCNC: 4.5 G/DL — SIGNIFICANT CHANGE UP (ref 3.3–5)
ALP SERPL-CCNC: 53 U/L — SIGNIFICANT CHANGE UP (ref 40–120)
ALT FLD-CCNC: 13 U/L — SIGNIFICANT CHANGE UP (ref 10–45)
ANION GAP SERPL CALC-SCNC: 13 MMOL/L — SIGNIFICANT CHANGE UP (ref 5–17)
AST SERPL-CCNC: 24 U/L — SIGNIFICANT CHANGE UP (ref 10–40)
BASOPHILS # BLD AUTO: 0.1 K/UL — SIGNIFICANT CHANGE UP (ref 0–0.2)
BASOPHILS NFR BLD AUTO: 1.9 % — SIGNIFICANT CHANGE UP (ref 0–2)
BILIRUB SERPL-MCNC: 0.2 MG/DL — SIGNIFICANT CHANGE UP (ref 0.2–1.2)
BUN SERPL-MCNC: 24 MG/DL — HIGH (ref 7–23)
CALCIUM SERPL-MCNC: 9.3 MG/DL — SIGNIFICANT CHANGE UP (ref 8.4–10.5)
CHLORIDE SERPL-SCNC: 103 MMOL/L — SIGNIFICANT CHANGE UP (ref 96–108)
CO2 SERPL-SCNC: 21 MMOL/L — LOW (ref 22–31)
CREAT SERPL-MCNC: 0.49 MG/DL — LOW (ref 0.5–1.3)
EGFR: 101 ML/MIN/1.73M2 — SIGNIFICANT CHANGE UP
EOSINOPHIL # BLD AUTO: 0.2 K/UL — SIGNIFICANT CHANGE UP (ref 0–0.5)
EOSINOPHIL NFR BLD AUTO: 6 % — SIGNIFICANT CHANGE UP (ref 0–6)
ERYTHROCYTE [SEDIMENTATION RATE] IN BLOOD: 19 MM/HR — SIGNIFICANT CHANGE UP (ref 0–20)
GLUCOSE SERPL-MCNC: 94 MG/DL — SIGNIFICANT CHANGE UP (ref 70–99)
HCT VFR BLD CALC: 38.8 % — SIGNIFICANT CHANGE UP (ref 34.5–45)
HGB BLD-MCNC: 12.8 G/DL — SIGNIFICANT CHANGE UP (ref 11.5–15.5)
LDH SERPL L TO P-CCNC: 212 U/L — SIGNIFICANT CHANGE UP (ref 50–242)
LYMPHOCYTES # BLD AUTO: 1.2 K/UL — SIGNIFICANT CHANGE UP (ref 1–3.3)
LYMPHOCYTES # BLD AUTO: 38.8 % — SIGNIFICANT CHANGE UP (ref 13–44)
MCHC RBC-ENTMCNC: 31.4 PG — SIGNIFICANT CHANGE UP (ref 27–34)
MCHC RBC-ENTMCNC: 33 G/DL — SIGNIFICANT CHANGE UP (ref 32–36)
MCV RBC AUTO: 95.3 FL — SIGNIFICANT CHANGE UP (ref 80–100)
MONOCYTES # BLD AUTO: 0.6 K/UL — SIGNIFICANT CHANGE UP (ref 0–0.9)
MONOCYTES NFR BLD AUTO: 20 % — HIGH (ref 2–14)
NEUTROPHILS # BLD AUTO: 1 K/UL — LOW (ref 1.8–7.4)
NEUTROPHILS NFR BLD AUTO: 33.3 % — LOW (ref 43–77)
PLATELET # BLD AUTO: 231 K/UL — SIGNIFICANT CHANGE UP (ref 150–400)
POTASSIUM SERPL-MCNC: 4.4 MMOL/L — SIGNIFICANT CHANGE UP (ref 3.5–5.3)
POTASSIUM SERPL-SCNC: 4.4 MMOL/L — SIGNIFICANT CHANGE UP (ref 3.5–5.3)
PROT SERPL-MCNC: 7.2 G/DL — SIGNIFICANT CHANGE UP (ref 6–8.3)
RBC # BLD: 4.08 M/UL — SIGNIFICANT CHANGE UP (ref 3.8–5.2)
RBC # FLD: 14.6 % — HIGH (ref 10.3–14.5)
SODIUM SERPL-SCNC: 137 MMOL/L — SIGNIFICANT CHANGE UP (ref 135–145)
WBC # BLD: 3.1 K/UL — LOW (ref 3.8–10.5)
WBC # FLD AUTO: 3.1 K/UL — LOW (ref 3.8–10.5)

## 2022-05-11 ENCOUNTER — OUTPATIENT (OUTPATIENT)
Dept: OUTPATIENT SERVICES | Facility: HOSPITAL | Age: 72
LOS: 1 days | Discharge: HOME | End: 2022-05-11

## 2022-05-11 DIAGNOSIS — Z98.890 OTHER SPECIFIED POSTPROCEDURAL STATES: Chronic | ICD-10-CM

## 2022-05-11 DIAGNOSIS — C81.90 HODGKIN LYMPHOMA, UNSPECIFIED, UNSPECIFIED SITE: ICD-10-CM

## 2022-05-11 DIAGNOSIS — Z90.13 ACQUIRED ABSENCE OF BILATERAL BREASTS AND NIPPLES: Chronic | ICD-10-CM

## 2022-05-11 DIAGNOSIS — Z96.7 PRESENCE OF OTHER BONE AND TENDON IMPLANTS: Chronic | ICD-10-CM

## 2022-05-11 DIAGNOSIS — Z90.89 ACQUIRED ABSENCE OF OTHER ORGANS: Chronic | ICD-10-CM

## 2022-05-12 ENCOUNTER — RESULT REVIEW (OUTPATIENT)
Age: 72
End: 2022-05-12

## 2022-05-12 ENCOUNTER — APPOINTMENT (OUTPATIENT)
Age: 72
End: 2022-05-12

## 2022-05-12 DIAGNOSIS — Z51.11 ENCOUNTER FOR ANTINEOPLASTIC CHEMOTHERAPY: ICD-10-CM

## 2022-05-12 DIAGNOSIS — R11.2 NAUSEA WITH VOMITING, UNSPECIFIED: ICD-10-CM

## 2022-05-12 LAB
ALBUMIN SERPL ELPH-MCNC: 4.6 G/DL — SIGNIFICANT CHANGE UP (ref 3.3–5)
ALP SERPL-CCNC: 57 U/L — SIGNIFICANT CHANGE UP (ref 40–120)
ALT FLD-CCNC: 20 U/L — SIGNIFICANT CHANGE UP (ref 10–45)
ANION GAP SERPL CALC-SCNC: 14 MMOL/L — SIGNIFICANT CHANGE UP (ref 5–17)
ANISOCYTOSIS BLD QL: SLIGHT — SIGNIFICANT CHANGE UP
AST SERPL-CCNC: 26 U/L — SIGNIFICANT CHANGE UP (ref 10–40)
BASOPHILS # BLD AUTO: 0.1 K/UL — SIGNIFICANT CHANGE UP (ref 0–0.2)
BASOPHILS NFR BLD AUTO: 1 % — SIGNIFICANT CHANGE UP (ref 0–2)
BILIRUB SERPL-MCNC: 0.2 MG/DL — SIGNIFICANT CHANGE UP (ref 0.2–1.2)
BUN SERPL-MCNC: 26 MG/DL — HIGH (ref 7–23)
BURR CELLS BLD QL SMEAR: PRESENT — SIGNIFICANT CHANGE UP
CALCIUM SERPL-MCNC: 9.5 MG/DL — SIGNIFICANT CHANGE UP (ref 8.4–10.5)
CHLORIDE SERPL-SCNC: 104 MMOL/L — SIGNIFICANT CHANGE UP (ref 96–108)
CO2 SERPL-SCNC: 22 MMOL/L — SIGNIFICANT CHANGE UP (ref 22–31)
CREAT SERPL-MCNC: 0.49 MG/DL — LOW (ref 0.5–1.3)
EGFR: 101 ML/MIN/1.73M2 — SIGNIFICANT CHANGE UP
EOSINOPHIL # BLD AUTO: 0.2 K/UL — SIGNIFICANT CHANGE UP (ref 0–0.5)
EOSINOPHIL NFR BLD AUTO: 5 % — SIGNIFICANT CHANGE UP (ref 0–6)
GLUCOSE SERPL-MCNC: 90 MG/DL — SIGNIFICANT CHANGE UP (ref 70–99)
HCT VFR BLD CALC: 37.1 % — SIGNIFICANT CHANGE UP (ref 34.5–45)
HGB BLD-MCNC: 12.2 G/DL — SIGNIFICANT CHANGE UP (ref 11.5–15.5)
LDH SERPL L TO P-CCNC: 246 U/L — HIGH (ref 50–242)
LYMPHOCYTES # BLD AUTO: 0.8 K/UL — LOW (ref 1–3.3)
LYMPHOCYTES # BLD AUTO: 32 % — SIGNIFICANT CHANGE UP (ref 13–44)
MACROCYTES BLD QL: SLIGHT — SIGNIFICANT CHANGE UP
MCHC RBC-ENTMCNC: 31.4 PG — SIGNIFICANT CHANGE UP (ref 27–34)
MCHC RBC-ENTMCNC: 32.8 G/DL — SIGNIFICANT CHANGE UP (ref 32–36)
MCV RBC AUTO: 95.7 FL — SIGNIFICANT CHANGE UP (ref 80–100)
MICROCYTES BLD QL: SLIGHT — SIGNIFICANT CHANGE UP
MONOCYTES # BLD AUTO: 0.5 K/UL — SIGNIFICANT CHANGE UP (ref 0–0.9)
MONOCYTES NFR BLD AUTO: 11 % — SIGNIFICANT CHANGE UP (ref 2–14)
NEUTROPHILS # BLD AUTO: 1.5 K/UL — LOW (ref 1.8–7.4)
NEUTROPHILS NFR BLD AUTO: 51 % — SIGNIFICANT CHANGE UP (ref 43–77)
OVALOCYTES BLD QL SMEAR: SLIGHT — SIGNIFICANT CHANGE UP
PLAT MORPH BLD: NORMAL — SIGNIFICANT CHANGE UP
PLATELET # BLD AUTO: 264 K/UL — SIGNIFICANT CHANGE UP (ref 150–400)
POIKILOCYTOSIS BLD QL AUTO: SLIGHT — SIGNIFICANT CHANGE UP
POLYCHROMASIA BLD QL SMEAR: SLIGHT — SIGNIFICANT CHANGE UP
POTASSIUM SERPL-MCNC: 4.4 MMOL/L — SIGNIFICANT CHANGE UP (ref 3.5–5.3)
POTASSIUM SERPL-SCNC: 4.4 MMOL/L — SIGNIFICANT CHANGE UP (ref 3.5–5.3)
PROT SERPL-MCNC: 7.3 G/DL — SIGNIFICANT CHANGE UP (ref 6–8.3)
RBC # BLD: 3.87 M/UL — SIGNIFICANT CHANGE UP (ref 3.8–5.2)
RBC # FLD: 14.4 % — SIGNIFICANT CHANGE UP (ref 10.3–14.5)
RBC BLD AUTO: SIGNIFICANT CHANGE UP
ROULEAUX BLD QL SMEAR: PRESENT — SIGNIFICANT CHANGE UP
SODIUM SERPL-SCNC: 140 MMOL/L — SIGNIFICANT CHANGE UP (ref 135–145)
WBC # BLD: 3.2 K/UL — LOW (ref 3.8–10.5)
WBC # FLD AUTO: 3.2 K/UL — LOW (ref 3.8–10.5)

## 2022-05-13 LAB — ERYTHROCYTE [SEDIMENTATION RATE] IN BLOOD: 19 MM/HR — SIGNIFICANT CHANGE UP (ref 0–20)

## 2022-05-20 ENCOUNTER — NON-APPOINTMENT (OUTPATIENT)
Age: 72
End: 2022-05-20

## 2022-05-20 ENCOUNTER — INPATIENT (INPATIENT)
Facility: HOSPITAL | Age: 72
LOS: 1 days | Discharge: HOME | DRG: 690 | End: 2022-05-22
Attending: HOSPITALIST | Admitting: HOSPITALIST
Payer: COMMERCIAL

## 2022-05-20 VITALS
HEART RATE: 113 BPM | OXYGEN SATURATION: 95 % | HEIGHT: 64 IN | TEMPERATURE: 99 F | SYSTOLIC BLOOD PRESSURE: 129 MMHG | DIASTOLIC BLOOD PRESSURE: 75 MMHG | RESPIRATION RATE: 20 BRPM | WEIGHT: 126.99 LBS

## 2022-05-20 DIAGNOSIS — Z90.13 ACQUIRED ABSENCE OF BILATERAL BREASTS AND NIPPLES: Chronic | ICD-10-CM

## 2022-05-20 DIAGNOSIS — Z98.890 OTHER SPECIFIED POSTPROCEDURAL STATES: Chronic | ICD-10-CM

## 2022-05-20 DIAGNOSIS — Z90.89 ACQUIRED ABSENCE OF OTHER ORGANS: Chronic | ICD-10-CM

## 2022-05-20 DIAGNOSIS — N39.0 URINARY TRACT INFECTION, SITE NOT SPECIFIED: ICD-10-CM

## 2022-05-20 DIAGNOSIS — Z96.7 PRESENCE OF OTHER BONE AND TENDON IMPLANTS: Chronic | ICD-10-CM

## 2022-05-20 LAB
ALBUMIN SERPL ELPH-MCNC: 4.3 G/DL — SIGNIFICANT CHANGE UP (ref 3.3–5.2)
ALP SERPL-CCNC: 100 U/L — SIGNIFICANT CHANGE UP (ref 40–120)
ALT FLD-CCNC: 69 U/L — HIGH
ANION GAP SERPL CALC-SCNC: 18 MMOL/L — HIGH (ref 5–17)
APPEARANCE UR: CLEAR — SIGNIFICANT CHANGE UP
APTT BLD: 27.7 SEC — SIGNIFICANT CHANGE UP (ref 27.5–35.5)
AST SERPL-CCNC: 60 U/L — HIGH
BACTERIA # UR AUTO: ABNORMAL
BASE EXCESS BLDV CALC-SCNC: -0.8 MMOL/L — SIGNIFICANT CHANGE UP (ref -2–3)
BASOPHILS # BLD AUTO: 0.05 K/UL — SIGNIFICANT CHANGE UP (ref 0–0.2)
BASOPHILS NFR BLD AUTO: 1.1 % — SIGNIFICANT CHANGE UP (ref 0–2)
BILIRUB SERPL-MCNC: 0.7 MG/DL — SIGNIFICANT CHANGE UP (ref 0.4–2)
BILIRUB UR-MCNC: ABNORMAL
BUN SERPL-MCNC: 18.8 MG/DL — SIGNIFICANT CHANGE UP (ref 8–20)
CA-I SERPL-SCNC: 1.15 MMOL/L — SIGNIFICANT CHANGE UP (ref 1.15–1.33)
CALCIUM SERPL-MCNC: 8.8 MG/DL — SIGNIFICANT CHANGE UP (ref 8.6–10.2)
CHLORIDE BLDV-SCNC: 101 MMOL/L — SIGNIFICANT CHANGE UP (ref 98–107)
CHLORIDE SERPL-SCNC: 97 MMOL/L — LOW (ref 98–107)
CO2 SERPL-SCNC: 21 MMOL/L — LOW (ref 22–29)
COLOR SPEC: YELLOW — SIGNIFICANT CHANGE UP
CREAT SERPL-MCNC: 0.56 MG/DL — SIGNIFICANT CHANGE UP (ref 0.5–1.3)
DIFF PNL FLD: ABNORMAL
EGFR: 98 ML/MIN/1.73M2 — SIGNIFICANT CHANGE UP
EOSINOPHIL # BLD AUTO: 0.03 K/UL — SIGNIFICANT CHANGE UP (ref 0–0.5)
EOSINOPHIL NFR BLD AUTO: 0.7 % — SIGNIFICANT CHANGE UP (ref 0–6)
EPI CELLS # UR: SIGNIFICANT CHANGE UP
GAS PNL BLDV: 136 MMOL/L — SIGNIFICANT CHANGE UP (ref 136–145)
GAS PNL BLDV: SIGNIFICANT CHANGE UP
GAS PNL BLDV: SIGNIFICANT CHANGE UP
GLUCOSE BLDV-MCNC: 106 MG/DL — HIGH (ref 70–99)
GLUCOSE SERPL-MCNC: 102 MG/DL — HIGH (ref 70–99)
GLUCOSE UR QL: NEGATIVE — SIGNIFICANT CHANGE UP
HCO3 BLDV-SCNC: 25 MMOL/L — SIGNIFICANT CHANGE UP (ref 22–29)
HCT VFR BLD CALC: 34.4 % — LOW (ref 34.5–45)
HCT VFR BLDA CALC: 35 % — SIGNIFICANT CHANGE UP
HGB BLD CALC-MCNC: 11.6 G/DL — LOW (ref 11.7–16.1)
HGB BLD-MCNC: 11.1 G/DL — LOW (ref 11.5–15.5)
IMM GRANULOCYTES NFR BLD AUTO: 1.3 % — SIGNIFICANT CHANGE UP (ref 0–1.5)
INR BLD: 1.22 RATIO — HIGH (ref 0.88–1.16)
KETONES UR-MCNC: ABNORMAL
LACTATE BLDV-MCNC: 1.2 MMOL/L — SIGNIFICANT CHANGE UP (ref 0.5–2)
LEUKOCYTE ESTERASE UR-ACNC: ABNORMAL
LIDOCAIN IGE QN: 17 U/L — LOW (ref 22–51)
LYMPHOCYTES # BLD AUTO: 0.95 K/UL — LOW (ref 1–3.3)
LYMPHOCYTES # BLD AUTO: 20.9 % — SIGNIFICANT CHANGE UP (ref 13–44)
MCHC RBC-ENTMCNC: 30.7 PG — SIGNIFICANT CHANGE UP (ref 27–34)
MCHC RBC-ENTMCNC: 32.3 GM/DL — SIGNIFICANT CHANGE UP (ref 32–36)
MCV RBC AUTO: 95 FL — SIGNIFICANT CHANGE UP (ref 80–100)
MONOCYTES # BLD AUTO: 0.38 K/UL — SIGNIFICANT CHANGE UP (ref 0–0.9)
MONOCYTES NFR BLD AUTO: 8.4 % — SIGNIFICANT CHANGE UP (ref 2–14)
NEUTROPHILS # BLD AUTO: 3.07 K/UL — SIGNIFICANT CHANGE UP (ref 1.8–7.4)
NEUTROPHILS NFR BLD AUTO: 67.6 % — SIGNIFICANT CHANGE UP (ref 43–77)
NITRITE UR-MCNC: POSITIVE
PCO2 BLDV: 45 MMHG — HIGH (ref 39–42)
PH BLDV: 7.35 — SIGNIFICANT CHANGE UP (ref 7.32–7.43)
PH UR: 5 — SIGNIFICANT CHANGE UP (ref 5–8)
PLATELET # BLD AUTO: 245 K/UL — SIGNIFICANT CHANGE UP (ref 150–400)
PO2 BLDV: <42 MMHG — SIGNIFICANT CHANGE UP (ref 25–45)
POTASSIUM BLDV-SCNC: 3.6 MMOL/L — SIGNIFICANT CHANGE UP (ref 3.5–5.1)
POTASSIUM SERPL-MCNC: 3.6 MMOL/L — SIGNIFICANT CHANGE UP (ref 3.5–5.3)
POTASSIUM SERPL-SCNC: 3.6 MMOL/L — SIGNIFICANT CHANGE UP (ref 3.5–5.3)
PROT SERPL-MCNC: 7.5 G/DL — SIGNIFICANT CHANGE UP (ref 6.6–8.7)
PROT UR-MCNC: 30 MG/DL
PROTHROM AB SERPL-ACNC: 14.2 SEC — HIGH (ref 10.5–13.4)
RAPID RVP RESULT: SIGNIFICANT CHANGE UP
RBC # BLD: 3.62 M/UL — LOW (ref 3.8–5.2)
RBC # FLD: 14.3 % — SIGNIFICANT CHANGE UP (ref 10.3–14.5)
RBC CASTS # UR COMP ASSIST: ABNORMAL /HPF (ref 0–4)
SAO2 % BLDV: 67.3 % — SIGNIFICANT CHANGE UP
SARS-COV-2 RNA SPEC QL NAA+PROBE: SIGNIFICANT CHANGE UP
SODIUM SERPL-SCNC: 136 MMOL/L — SIGNIFICANT CHANGE UP (ref 135–145)
SP GR SPEC: 1.02 — SIGNIFICANT CHANGE UP (ref 1.01–1.02)
UROBILINOGEN FLD QL: 1
WBC # BLD: 4.54 K/UL — SIGNIFICANT CHANGE UP (ref 3.8–10.5)
WBC # FLD AUTO: 4.54 K/UL — SIGNIFICANT CHANGE UP (ref 3.8–10.5)
WBC UR QL: ABNORMAL /HPF (ref 0–5)

## 2022-05-20 PROCEDURE — 71045 X-RAY EXAM CHEST 1 VIEW: CPT | Mod: 26

## 2022-05-20 PROCEDURE — 99285 EMERGENCY DEPT VISIT HI MDM: CPT

## 2022-05-20 PROCEDURE — 74176 CT ABD & PELVIS W/O CONTRAST: CPT | Mod: 26,MA

## 2022-05-20 RX ORDER — VANCOMYCIN HCL 1 G
1000 VIAL (EA) INTRAVENOUS ONCE
Refills: 0 | Status: COMPLETED | OUTPATIENT
Start: 2022-05-20 | End: 2022-05-20

## 2022-05-20 RX ORDER — CEFEPIME 1 G/1
2000 INJECTION, POWDER, FOR SOLUTION INTRAMUSCULAR; INTRAVENOUS ONCE
Refills: 0 | Status: COMPLETED | OUTPATIENT
Start: 2022-05-20 | End: 2022-05-20

## 2022-05-20 RX ORDER — PROCHLORPERAZINE MALEATE 5 MG
10 TABLET ORAL ONCE
Refills: 0 | Status: COMPLETED | OUTPATIENT
Start: 2022-05-20 | End: 2022-05-20

## 2022-05-20 RX ORDER — MORPHINE SULFATE 50 MG/1
4 CAPSULE, EXTENDED RELEASE ORAL ONCE
Refills: 0 | Status: DISCONTINUED | OUTPATIENT
Start: 2022-05-20 | End: 2022-05-20

## 2022-05-20 RX ORDER — RADIOPAQUE PVC MARKERS/BARIUM 24MARKERS
450 CAPSULE ORAL ONCE
Refills: 0 | Status: COMPLETED | OUTPATIENT
Start: 2022-05-20 | End: 2022-05-20

## 2022-05-20 RX ORDER — SODIUM CHLORIDE 9 MG/ML
1800 INJECTION, SOLUTION INTRAVENOUS ONCE
Refills: 0 | Status: COMPLETED | OUTPATIENT
Start: 2022-05-20 | End: 2022-05-20

## 2022-05-20 RX ADMIN — MORPHINE SULFATE 4 MILLIGRAM(S): 50 CAPSULE, EXTENDED RELEASE ORAL at 18:14

## 2022-05-20 RX ADMIN — SODIUM CHLORIDE 1800 MILLILITER(S): 9 INJECTION, SOLUTION INTRAVENOUS at 17:28

## 2022-05-20 RX ADMIN — Medication 104 MILLIGRAM(S): at 21:58

## 2022-05-20 RX ADMIN — CEFEPIME 100 MILLIGRAM(S): 1 INJECTION, POWDER, FOR SOLUTION INTRAMUSCULAR; INTRAVENOUS at 17:07

## 2022-05-20 RX ADMIN — Medication 250 MILLIGRAM(S): at 20:27

## 2022-05-20 RX ADMIN — Medication 1 ENEMA: at 23:22

## 2022-05-20 RX ADMIN — MORPHINE SULFATE 4 MILLIGRAM(S): 50 CAPSULE, EXTENDED RELEASE ORAL at 16:51

## 2022-05-20 RX ADMIN — Medication 450 MILLILITER(S): at 18:14

## 2022-05-20 RX ADMIN — CEFEPIME 2000 MILLIGRAM(S): 1 INJECTION, POWDER, FOR SOLUTION INTRAMUSCULAR; INTRAVENOUS at 18:14

## 2022-05-20 RX ADMIN — SODIUM CHLORIDE 1800 MILLILITER(S): 9 INJECTION, SOLUTION INTRAVENOUS at 18:15

## 2022-05-20 NOTE — ED PROVIDER NOTE - CLINICAL SUMMARY MEDICAL DECISION MAKING FREE TEXT BOX
72yo woman PMH hodgkins lymphoma on chemo presents with n/v, constipation, abdominal pain, increased urinary frequency, and 1 episode of fever and has lower abdominal tenderness to palpation. Concern for infection in immunocompromised state/neutropenic fever/diverticulitis. Blood work, cxr, urine, CT a/p, started on fluids and abx,

## 2022-05-20 NOTE — CONSULT NOTE ADULT - ASSESSMENT
71F with hodkin lymphoma, constipation on Dulcolax and colace who presents with worsening constipation with thickening of colon with large recto-sigmoid stool burden concerning for stercoral colitis. Vital stable, without leukocytosis or lactic acidosis and soft abdominal exam  - No surgical intervention at this time  - Bowel regimen, enema and suppository  - Serial abdominal exams 71F with hodgkin lymphoma, constipation on Dulcolax and colace who presents with worsening constipation with thickening of colon with large recto-sigmoid stool burden concerning for stercoral colitis. Vital stable, without leukocytosis or lactic acidosis and soft abdominal exam  - No surgical intervention at this time  - Given immunocompromised state with Lymphoma on chemotherapy would watch closely  - Bowel regimen, enema and suppository  - Serial abdominal exams

## 2022-05-20 NOTE — ED PROVIDER NOTE - PHYSICAL EXAMINATION
General: frail-appearing woman in no acute distress  Head: normocephalic, atraumatic  Eyes: PERRL   Mouth: moist mucous membranes  Neck: supple neck  CV: normal rate and rhythm, no LE edema, peripheral pulses 2+ bilateral UE and LE  Respiratory: clear to auscultation bilaterally  Abdomen: soft, tender to palpation of bilateral lower abdomen  : mild suprapubic tenderness, no CVAT  MSK: no joint deformities  Neuro: alert and oriented x3, speech clear, moving all extremities spontaneously without difficulty  Skin: no rash noted

## 2022-05-20 NOTE — ED ADULT NURSE REASSESSMENT NOTE - NS ED NURSE REASSESS COMMENT FT1
Documentation delayed due to patient care: Received patient from prior shift. AOX3. No acute distress noted. Remains on continuous cardiac monitoring. CT completed. Endorses nausea. Medicated as per MD order. Will continue to monitor and provide care.

## 2022-05-20 NOTE — ED ADULT NURSE NOTE - OBJECTIVE STATEMENT
pt arrives from ER with complaints of abdominal pain that has been ongoing since Monday (5/16), last BM was Monday as well but says she is passing gas, has no appetite and complains of nausea no vomit. NSR on CM 97% on  on RA. IV placed, meds given, labs sent,

## 2022-05-20 NOTE — CONSULT NOTE ADULT - SUBJECTIVE AND OBJECTIVE BOX
HPI:    71F with history of Hodgkin lymphoma on chemotherapy (LD 22) who has been having constipation with chemotherapy who presents with abdominal pain. Patient reports 2 days of low abdominal/pelvic discomfort with associated poor oral intake since tuesday prior to presentation. No bowel movement since monday. Passing flatus. No fevers, chill, chest paint or shortness of breath. Last colonoscopy 3 years ago and is due every 10  years.     PAST MEDICAL & SURGICAL HISTORY:  Breast cancer in female  right -  s/p chemo and radiation      IBS (irritable bowel syndrome)      Osteoporosis      Osteoarthritis      Rheumatoid arthritis      Thyroid nodule  benign - removed      GERD (gastroesophageal reflux disease)      Bladder prolapse, female, acquired      Uterine prolapse  Pessary      Shingles        Spinal stenosis      Breast cancer  b/l DCIS      Anxiety      Urinary incontinence      UTI (urinary tract infection)      Hiatal hernia      H/O ovarian cystectomy      H/O partial thyroidectomy      S/P ORIF (open reduction internal fixation) fracture  right wrist 2017      S/P breast lumpectomy  right       History of tonsillectomy      H/O rhinoplasty      H/O bilateral mastectomy  with alloderm 2018          MEDICATIONS  (STANDING):  saline laxative (FLEET) Rectal Enema 1 Enema Rectal once    MEDICATIONS  (PRN):      Allergies    latex (Hives)  No Known Drug Allergies    Intolerances    aspirin (Stomach Upset)      SOCIAL HISTORY:    FAMILY HISTORY:  Family history of lung cancer (Mother)    Family history of bladder cancer (Father)    Family history of myelofibrosis (Sibling)        Vital Signs Last 24 Hrs  T(C): 36.5 (20 May 2022 18:29), Max: 37.3 (20 May 2022 15:40)  T(F): 97.7 (20 May 2022 18:29), Max: 99.1 (20 May 2022 15:40)  HR: 89 (20 May 2022 18:29) (89 - 113)  BP: 118/81 (20 May 2022 18:29) (105/70 - 129/75)  BP(mean): --  RR: 19 (20 May 2022 18:29) (19 - 20)  SpO2: 99% (20 May 2022 18:29) (95% - 99%)    PHYSICAL EXAM:  GEN: NAD, laying in bed, appears stated age  HEENT: NCAT, clear oral mucosa, normal conjunctiva. Right chest port  Chest: non-tender  CV:  non-tachycardic, 2+ radial pulse  Pulm: no increased work of breathing, no conversational dyspnea  GI: soft, non-tender, nondistended. mild discomfort with deep lower abdominal palpation  MSK: moving all extremities         LABS:                        11.1   4.54  )-----------( 245      ( 20 May 2022 17:20 )             34.4     05-20    136  |  97<L>  |  18.8  ----------------------------<  102<H>  3.6   |  21.0<L>  |  0.56    Ca    8.8      20 May 2022 17:20    TPro  7.5  /  Alb  4.3  /  TBili  0.7  /  DBili  x   /  AST  60<H>  /  ALT  69<H>  /  AlkPhos  100  05-20    PT/INR - ( 20 May 2022 17:20 )   PT: 14.2 sec;   INR: 1.22 ratio         PTT - ( 20 May 2022 17:20 )  PTT:27.7 sec  Urinalysis Basic - ( 20 May 2022 17:33 )    Color: Yellow / Appearance: Clear / S.020 / pH: x  Gluc: x / Ketone: Trace  / Bili: Small / Urobili: 1   Blood: x / Protein: 30 mg/dL / Nitrite: Positive   Leuk Esterase: Moderate / RBC: 6-10 /HPF / WBC 11-25 /HPF   Sq Epi: x / Non Sq Epi: Few / Bacteria: Many        RADIOLOGY & ADDITIONAL STUDIES:    INTERPRETATION: CLINICAL INFORMATION: Abdominal pain.    COMPARISON: 2022 PET/CT    CONTRAST/COMPLICATIONS:  IV Contrast: NONE  Oral Contrast: Gastroview  Complications: None reported at time of study completion    PROCEDURE:  CT of the Abdomen and Pelvis was performed.  Sagittal and coronal reformats were performed.    FINDINGS:  Evaluation of solid organs and vascular structures is limited without intravenous contrast.    LOWER CHEST: Bibasilar linear and discoid atelectasis. Central venous catheter tip in the SVC. Partially imaged bilateral breast implants. Small hiatal hernia.    LIVER: Within normal limits.  BILE DUCTS: Normal caliber.  GALLBLADDER: Within normal limits.  SPLEEN: Within normal limits.  PANCREAS: Within normal limits.  ADRENALS: Within normal limits.  KIDNEYS/URETERS: No hydronephrosis or obstructing stone. Punctate nonobstructing right renal calculus.    BLADDER: Within normal limits.  REPRODUCTIVE ORGANS: Uterus and adnexa within normal limits. Vaginal pessary.    BOWEL: No bowel obstruction. Appendix is normal. Large colonic stool burden. Mild rectosigmoid colonic wall thickening with surrounding haziness. Mild cecal wall thickening.  PERITONEUM: No ascites.  VESSELS: Atherosclerotic changes.  RETROPERITONEUM/LYMPH NODES: No lymphadenopathy.  ABDOMINAL WALL: Within normal limits.  BONES: Degenerative changes.    IMPRESSION:  Large colonic stool burden.  Mild cecal wall thickening and mild rectosigmoid colonic wall thickening with surrounding haziness, could reflect superimposed stercoral colitis.        --- End of Report ---

## 2022-05-20 NOTE — ED PROVIDER NOTE - PROGRESS NOTE DETAILS
MD Maryellen: surgery was consulted for stercoral colitis on CT with no surgical intervention recommended. minimal stool burden noted in rectum on exam and enema ordered. will admit pt for further iv abx and management of stercoral colitis.

## 2022-05-20 NOTE — ED PROVIDER NOTE - OBJECTIVE STATEMENT
70yo woman PMH breast cancer s/p bilateral mastectomy, hodgkins lymphoma on chemo (Heme/onc: Dr. Weller) presents with n/v and abdominal pain after chemotherapy last week. Pt states she has been constipated with last BM 5 days ago but passing gas. Pt also complains of increased urinary frequency but no dysuria. Pt also endorses fever of 101.5 yesterday morning. Denies cough.

## 2022-05-20 NOTE — ED PROVIDER NOTE - NS ED ROS FT
General: +fever  Head: no headache  Eyes: no vision change  ENT: no nasal discharge/congestion, no sore throat  CV: no chest pain  Resp: no SOB, no cough  GI: +N/V, +constipation, +abdominal pain  : no dysuria, +increased urinary frequency  MSK: no joint pain  Skin: no new rash  Neuro: no focal weakness, no change in sensation

## 2022-05-20 NOTE — ED ADULT TRIAGE NOTE - CHIEF COMPLAINT QUOTE
C/o abdominal pain x1 week. Pt states last BM was on Monday. +Increased urinary frequency. Pt states she is currently on chemo for Hodgkin's Lymphoma.

## 2022-05-21 LAB
ANION GAP SERPL CALC-SCNC: 17 MMOL/L — SIGNIFICANT CHANGE UP (ref 5–17)
BASOPHILS # BLD AUTO: 0.02 K/UL — SIGNIFICANT CHANGE UP (ref 0–0.2)
BASOPHILS NFR BLD AUTO: 0.6 % — SIGNIFICANT CHANGE UP (ref 0–2)
BUN SERPL-MCNC: 14 MG/DL — SIGNIFICANT CHANGE UP (ref 8–20)
CALCIUM SERPL-MCNC: 8.4 MG/DL — LOW (ref 8.6–10.2)
CHLORIDE SERPL-SCNC: 98 MMOL/L — SIGNIFICANT CHANGE UP (ref 98–107)
CO2 SERPL-SCNC: 19 MMOL/L — LOW (ref 22–29)
CREAT SERPL-MCNC: 0.41 MG/DL — LOW (ref 0.5–1.3)
EGFR: 106 ML/MIN/1.73M2 — SIGNIFICANT CHANGE UP
EOSINOPHIL # BLD AUTO: 0.03 K/UL — SIGNIFICANT CHANGE UP (ref 0–0.5)
EOSINOPHIL NFR BLD AUTO: 0.8 % — SIGNIFICANT CHANGE UP (ref 0–6)
GLUCOSE SERPL-MCNC: 135 MG/DL — HIGH (ref 70–99)
HCT VFR BLD CALC: 31.2 % — LOW (ref 34.5–45)
HGB BLD-MCNC: 10.3 G/DL — LOW (ref 11.5–15.5)
IMM GRANULOCYTES NFR BLD AUTO: 0.8 % — SIGNIFICANT CHANGE UP (ref 0–1.5)
LYMPHOCYTES # BLD AUTO: 0.69 K/UL — LOW (ref 1–3.3)
LYMPHOCYTES # BLD AUTO: 19.2 % — SIGNIFICANT CHANGE UP (ref 13–44)
MCHC RBC-ENTMCNC: 31.1 PG — SIGNIFICANT CHANGE UP (ref 27–34)
MCHC RBC-ENTMCNC: 33 GM/DL — SIGNIFICANT CHANGE UP (ref 32–36)
MCV RBC AUTO: 94.3 FL — SIGNIFICANT CHANGE UP (ref 80–100)
MONOCYTES # BLD AUTO: 0.57 K/UL — SIGNIFICANT CHANGE UP (ref 0–0.9)
MONOCYTES NFR BLD AUTO: 15.9 % — HIGH (ref 2–14)
NEUTROPHILS # BLD AUTO: 2.25 K/UL — SIGNIFICANT CHANGE UP (ref 1.8–7.4)
NEUTROPHILS NFR BLD AUTO: 62.7 % — SIGNIFICANT CHANGE UP (ref 43–77)
PLATELET # BLD AUTO: 204 K/UL — SIGNIFICANT CHANGE UP (ref 150–400)
POTASSIUM SERPL-MCNC: 3.8 MMOL/L — SIGNIFICANT CHANGE UP (ref 3.5–5.3)
POTASSIUM SERPL-SCNC: 3.8 MMOL/L — SIGNIFICANT CHANGE UP (ref 3.5–5.3)
RBC # BLD: 3.31 M/UL — LOW (ref 3.8–5.2)
RBC # FLD: 14 % — SIGNIFICANT CHANGE UP (ref 10.3–14.5)
SODIUM SERPL-SCNC: 134 MMOL/L — LOW (ref 135–145)
WBC # BLD: 3.59 K/UL — LOW (ref 3.8–10.5)
WBC # FLD AUTO: 3.59 K/UL — LOW (ref 3.8–10.5)

## 2022-05-21 PROCEDURE — 99223 1ST HOSP IP/OBS HIGH 75: CPT

## 2022-05-21 PROCEDURE — 12345: CPT | Mod: NC

## 2022-05-21 RX ORDER — LANOLIN ALCOHOL/MO/W.PET/CERES
3 CREAM (GRAM) TOPICAL AT BEDTIME
Refills: 0 | Status: DISCONTINUED | OUTPATIENT
Start: 2022-05-21 | End: 2022-05-22

## 2022-05-21 RX ORDER — CEFTRIAXONE 500 MG/1
1000 INJECTION, POWDER, FOR SOLUTION INTRAMUSCULAR; INTRAVENOUS EVERY 24 HOURS
Refills: 0 | Status: DISCONTINUED | OUTPATIENT
Start: 2022-05-21 | End: 2022-05-22

## 2022-05-21 RX ORDER — POLYETHYLENE GLYCOL 3350 17 G/17G
17 POWDER, FOR SOLUTION ORAL DAILY
Refills: 0 | Status: DISCONTINUED | OUTPATIENT
Start: 2022-05-21 | End: 2022-05-22

## 2022-05-21 RX ORDER — MORPHINE SULFATE 50 MG/1
2 CAPSULE, EXTENDED RELEASE ORAL ONCE
Refills: 0 | Status: DISCONTINUED | OUTPATIENT
Start: 2022-05-21 | End: 2022-05-22

## 2022-05-21 RX ORDER — ASCORBIC ACID 60 MG
1 TABLET,CHEWABLE ORAL
Qty: 0 | Refills: 0 | DISCHARGE

## 2022-05-21 RX ORDER — MINERAL OIL
133 OIL (ML) MISCELLANEOUS ONCE
Refills: 0 | Status: DISCONTINUED | OUTPATIENT
Start: 2022-05-21 | End: 2022-05-22

## 2022-05-21 RX ORDER — MULTIVIT WITH MIN/MFOLATE/K2 340-15/3 G
1 POWDER (GRAM) ORAL AT BEDTIME
Refills: 0 | Status: DISCONTINUED | OUTPATIENT
Start: 2022-05-21 | End: 2022-05-22

## 2022-05-21 RX ORDER — ENOXAPARIN SODIUM 100 MG/ML
40 INJECTION SUBCUTANEOUS EVERY 24 HOURS
Refills: 0 | Status: DISCONTINUED | OUTPATIENT
Start: 2022-05-21 | End: 2022-05-22

## 2022-05-21 RX ORDER — GABAPENTIN 400 MG/1
300 CAPSULE ORAL DAILY
Refills: 0 | Status: DISCONTINUED | OUTPATIENT
Start: 2022-05-21 | End: 2022-05-22

## 2022-05-21 RX ORDER — SENNA PLUS 8.6 MG/1
2 TABLET ORAL AT BEDTIME
Refills: 0 | Status: DISCONTINUED | OUTPATIENT
Start: 2022-05-21 | End: 2022-05-22

## 2022-05-21 RX ORDER — LACTULOSE 10 G/15ML
15 SOLUTION ORAL ONCE
Refills: 0 | Status: COMPLETED | OUTPATIENT
Start: 2022-05-21 | End: 2022-05-21

## 2022-05-21 RX ORDER — ACETAMINOPHEN 500 MG
650 TABLET ORAL EVERY 6 HOURS
Refills: 0 | Status: DISCONTINUED | OUTPATIENT
Start: 2022-05-21 | End: 2022-05-22

## 2022-05-21 RX ORDER — CHOLECALCIFEROL (VITAMIN D3) 125 MCG
1 CAPSULE ORAL
Qty: 0 | Refills: 0 | DISCHARGE

## 2022-05-21 RX ORDER — POLYETHYLENE GLYCOL 3350 17 G/17G
17 POWDER, FOR SOLUTION ORAL
Refills: 0 | Status: DISCONTINUED | OUTPATIENT
Start: 2022-05-21 | End: 2022-05-21

## 2022-05-21 RX ORDER — ONDANSETRON 8 MG/1
4 TABLET, FILM COATED ORAL EVERY 8 HOURS
Refills: 0 | Status: DISCONTINUED | OUTPATIENT
Start: 2022-05-21 | End: 2022-05-22

## 2022-05-21 RX ADMIN — ONDANSETRON 4 MILLIGRAM(S): 8 TABLET, FILM COATED ORAL at 08:23

## 2022-05-21 RX ADMIN — GABAPENTIN 300 MILLIGRAM(S): 400 CAPSULE ORAL at 11:29

## 2022-05-21 RX ADMIN — POLYETHYLENE GLYCOL 3350 17 GRAM(S): 17 POWDER, FOR SOLUTION ORAL at 11:29

## 2022-05-21 RX ADMIN — SENNA PLUS 2 TABLET(S): 8.6 TABLET ORAL at 21:50

## 2022-05-21 RX ADMIN — Medication 30 MILLILITER(S): at 11:29

## 2022-05-21 RX ADMIN — CEFTRIAXONE 100 MILLIGRAM(S): 500 INJECTION, POWDER, FOR SOLUTION INTRAMUSCULAR; INTRAVENOUS at 06:20

## 2022-05-21 RX ADMIN — Medication 10 MILLIGRAM(S): at 21:50

## 2022-05-21 RX ADMIN — LACTULOSE 15 GRAM(S): 10 SOLUTION ORAL at 09:04

## 2022-05-21 NOTE — H&P ADULT - HISTORY OF PRESENT ILLNESS
72yo F with PMHx of Hodgkin lymphoma on chemotherapy (LD 5/12/22), Breast CA s/p b/l mastectomy, neuropathy presented to ED c/o abdominal pain. Pt reports to having constipation with chemotherapy. Patient reports 2 days of low abdominal/pelvic discomfort with associated poor oral intake. No bowel movement since monday. Passing flatus. No fevers, chill, chest paint or shortness of breath. Pt also report to fever of 101 at home. In the ED afebrile, no leukocytosis, found to have UTI on UA, CT A/p with large stool burden/stercoral colitis. Attempted disimpaction but unable to in the ED.

## 2022-05-21 NOTE — H&P ADULT - NSHPPHYSICALEXAM_GEN_ALL_CORE
T(C): 36.7 (05-21-22 @ 05:15), Max: 37.3 (05-20-22 @ 15:40)  HR: 80 (05-21-22 @ 05:15) (80 - 113)  BP: 122/79 (05-21-22 @ 05:15) (105/70 - 129/75)  RR: 16 (05-21-22 @ 05:15) (16 - 20)  SpO2: 95% (05-21-22 @ 05:15) (95% - 99%)    GENERAL: patient appears well, no acute distress, appropriate, pleasant  EYES: sclera clear, no exudates  ENMT: oropharynx clear without erythema, no exudates, moist mucous membranes  NECK: supple, soft, no thyromegaly noted  LUNGS: good air entry bilaterally, clear to auscultation, symmetric breath sounds, no wheezing or rhonchi appreciated  HEART: soft S1/S2, regular rate and rhythm, no murmurs noted, no lower extremity edema  GASTROINTESTINAL: abdomen is soft, nontender, nondistended, normoactive bowel sounds, no palpable masses  INTEGUMENT: good skin turgor, warm skin, appears well perfused  MUSCULOSKELETAL: no clubbing or cyanosis, no obvious deformity  NEUROLOGIC: awake, alert, oriented x3, good muscle tone in 4 extremities, no obvious sensory deficits  PSYCHIATRIC: mood is good, affect is congruent, linear and logical thought process  HEME/LYMPH: no palpable supraclavicular nodules, no obvious ecchymosis or petechiae

## 2022-05-21 NOTE — H&P ADULT - ASSESSMENT
72yo F with PMHx of Hodgkin lymphoma on chemotherapy (LD 5/12/22), Breast CA s/p b/l mastectomy, neuropathy presented to ED c/o abdominal pain. Pt reports to having constipation with chemotherapy.     UTI  -UA grossly positive  -reported fever at home, no leukocytosis   -receive vanco, cefepime, 2L IVF bolus   -switched abx to ceftriaxone 1g daily   -pending culture and sensitivity    Abdominal pain/constipation    -likely related to large stool burn in the colon  -CT a/p with stercoral colitis  -started on stool regiment   -colorectal consult noted  -pain control prn     AG metabolic acidosis   -likely due dehydration  -s/p 2L IVF bolus   -encourage po hydration   -check repeat BMP     Neuropathy  -cont gabapentin    DVT ppx  lovenox

## 2022-05-21 NOTE — PATIENT PROFILE ADULT - FALL HARM RISK - UNIVERSAL INTERVENTIONS
Bed in lowest position, wheels locked, appropriate side rails in place/Call bell, personal items and telephone in reach/Instruct patient to call for assistance before getting out of bed or chair/Non-slip footwear when patient is out of bed/[place] to call system/Physically safe environment - no spills, clutter or unnecessary equipment/Purposeful Proactive Rounding/Room/bathroom lighting operational, light cord in reach

## 2022-05-22 ENCOUNTER — TRANSCRIPTION ENCOUNTER (OUTPATIENT)
Age: 72
End: 2022-05-22

## 2022-05-22 VITALS
TEMPERATURE: 98 F | RESPIRATION RATE: 19 BRPM | DIASTOLIC BLOOD PRESSURE: 68 MMHG | SYSTOLIC BLOOD PRESSURE: 103 MMHG | OXYGEN SATURATION: 94 % | HEART RATE: 95 BPM

## 2022-05-22 LAB
HCV AB S/CO SERPL IA: 0.07 S/CO — SIGNIFICANT CHANGE UP (ref 0–0.99)
HCV AB SERPL-IMP: SIGNIFICANT CHANGE UP

## 2022-05-22 PROCEDURE — 87040 BLOOD CULTURE FOR BACTERIA: CPT

## 2022-05-22 PROCEDURE — 71045 X-RAY EXAM CHEST 1 VIEW: CPT

## 2022-05-22 PROCEDURE — 80048 BASIC METABOLIC PNL TOTAL CA: CPT

## 2022-05-22 PROCEDURE — 93010 ELECTROCARDIOGRAM REPORT: CPT

## 2022-05-22 PROCEDURE — 84295 ASSAY OF SERUM SODIUM: CPT

## 2022-05-22 PROCEDURE — 0225U NFCT DS DNA&RNA 21 SARSCOV2: CPT

## 2022-05-22 PROCEDURE — 96375 TX/PRO/DX INJ NEW DRUG ADDON: CPT

## 2022-05-22 PROCEDURE — 80053 COMPREHEN METABOLIC PANEL: CPT

## 2022-05-22 PROCEDURE — 81001 URINALYSIS AUTO W/SCOPE: CPT

## 2022-05-22 PROCEDURE — 87186 SC STD MICRODIL/AGAR DIL: CPT

## 2022-05-22 PROCEDURE — 85610 PROTHROMBIN TIME: CPT

## 2022-05-22 PROCEDURE — 74176 CT ABD & PELVIS W/O CONTRAST: CPT | Mod: MA

## 2022-05-22 PROCEDURE — 82435 ASSAY OF BLOOD CHLORIDE: CPT

## 2022-05-22 PROCEDURE — 85014 HEMATOCRIT: CPT

## 2022-05-22 PROCEDURE — 87086 URINE CULTURE/COLONY COUNT: CPT

## 2022-05-22 PROCEDURE — 83690 ASSAY OF LIPASE: CPT

## 2022-05-22 PROCEDURE — 85025 COMPLETE CBC W/AUTO DIFF WBC: CPT

## 2022-05-22 PROCEDURE — 82947 ASSAY GLUCOSE BLOOD QUANT: CPT

## 2022-05-22 PROCEDURE — 99232 SBSQ HOSP IP/OBS MODERATE 35: CPT

## 2022-05-22 PROCEDURE — 36415 COLL VENOUS BLD VENIPUNCTURE: CPT

## 2022-05-22 PROCEDURE — 84132 ASSAY OF SERUM POTASSIUM: CPT

## 2022-05-22 PROCEDURE — 99239 HOSP IP/OBS DSCHRG MGMT >30: CPT

## 2022-05-22 PROCEDURE — 82330 ASSAY OF CALCIUM: CPT

## 2022-05-22 PROCEDURE — 93005 ELECTROCARDIOGRAM TRACING: CPT

## 2022-05-22 PROCEDURE — 85730 THROMBOPLASTIN TIME PARTIAL: CPT

## 2022-05-22 PROCEDURE — 86803 HEPATITIS C AB TEST: CPT

## 2022-05-22 PROCEDURE — 99285 EMERGENCY DEPT VISIT HI MDM: CPT

## 2022-05-22 PROCEDURE — 82803 BLOOD GASES ANY COMBINATION: CPT

## 2022-05-22 PROCEDURE — 96365 THER/PROPH/DIAG IV INF INIT: CPT

## 2022-05-22 PROCEDURE — 83605 ASSAY OF LACTIC ACID: CPT

## 2022-05-22 PROCEDURE — 85018 HEMOGLOBIN: CPT

## 2022-05-22 RX ORDER — CIPROFLOXACIN LACTATE 400MG/40ML
1 VIAL (ML) INTRAVENOUS
Qty: 4 | Refills: 0
Start: 2022-05-22 | End: 2022-05-23

## 2022-05-22 RX ORDER — GABAPENTIN 400 MG/1
1 CAPSULE ORAL
Qty: 0 | Refills: 0 | DISCHARGE
Start: 2022-05-22

## 2022-05-22 RX ORDER — ACETAMINOPHEN 500 MG
2 TABLET ORAL
Qty: 0 | Refills: 0 | DISCHARGE

## 2022-05-22 RX ORDER — LACTULOSE 10 G/15ML
20 SOLUTION ORAL ONCE
Refills: 0 | Status: COMPLETED | OUTPATIENT
Start: 2022-05-22 | End: 2022-05-22

## 2022-05-22 RX ORDER — LACTULOSE 10 G/15ML
15 SOLUTION ORAL
Qty: 105 | Refills: 0
Start: 2022-05-22 | End: 2022-05-28

## 2022-05-22 RX ORDER — POLYETHYLENE GLYCOL 3350 17 G/17G
17 POWDER, FOR SOLUTION ORAL
Qty: 0 | Refills: 0 | DISCHARGE

## 2022-05-22 RX ORDER — GABAPENTIN 400 MG/1
1 CAPSULE ORAL
Qty: 0 | Refills: 0 | DISCHARGE

## 2022-05-22 RX ORDER — SENNA PLUS 8.6 MG/1
2 TABLET ORAL
Qty: 0 | Refills: 0 | DISCHARGE

## 2022-05-22 RX ADMIN — POLYETHYLENE GLYCOL 3350 17 GRAM(S): 17 POWDER, FOR SOLUTION ORAL at 12:31

## 2022-05-22 RX ADMIN — LACTULOSE 20 GRAM(S): 10 SOLUTION ORAL at 12:31

## 2022-05-22 RX ADMIN — CEFTRIAXONE 100 MILLIGRAM(S): 500 INJECTION, POWDER, FOR SOLUTION INTRAMUSCULAR; INTRAVENOUS at 06:00

## 2022-05-22 NOTE — PROGRESS NOTE ADULT - SUBJECTIVE AND OBJECTIVE BOX
Patient is a 71y old  Female who presents with a chief complaint of UTI, stercoral colitis     Patient seen and examined at bedside in am   reports  having small BM , no abdominal pain   tolerating diet       ROS   constipation , no nausea , no abdominal pain       Vital Signs Last 24 Hrs  T(F): 98.2 (21 May 2022 07:30), Max: 99.1 (20 May 2022 15:40)  HR: 85 (21 May 2022 07:30) (80 - 100)  BP: 122/78 (21 May 2022 07:30) (105/70 - 123/85)  RR: 18 (21 May 2022 07:30) (16 - 20)  SpO2: 96% (21 May 2022 07:30) (95% - 99%)      PHYSICAL EXAM:  General: awake alert , no distress   Neck: supple, no JVD   Lungs: CTA bilateral   Cardio: RRR, S1/S2, No murmur  Abdomen: Soft, Nontender, Nondistended; Bowel sounds present  Extremities: No calf tenderness, No pitting edema    LABS:                        10.3   3.59  )-----------( 204      ( 21 May 2022 09:39 )             31.2     05-    134  |  98  |  14.0  ----------------------------<  135  3.8   |  19.0  |  0.41    Ca    8.4      21 May 2022 09:39    TPro  7.5  /  Alb  4.3  /  TBili  0.7  /  DBili  x   /  AST  60  /  ALT  69  /  AlkPhos  100        Lipase, Serum: 17 U/L (22 @ 17:20)      PT/INR - ( 20 May 2022 17:20 )   PT: 14.2 sec;   INR: 1.22 ratio         PTT - ( 20 May 2022 17:20 )  PTT:27.7 sec              17:14 - VBG - pH: 7.350 | pCO2: 45    | pO2: <42   | Lactate: 1.20                 Urinalysis Basic - ( 20 May 2022 17:33 )    Color: Yellow / Appearance: Clear / S.020 / pH: x  Gluc: x / Ketone: Trace  / Bili: Small / Urobili: 1   Blood: x / Protein: 30 mg/dL / Nitrite: Positive   Leuk Esterase: Moderate / RBC: 6-10 /HPF / WBC 11-25 /HPF   Sq Epi: x / Non Sq Epi: Few / Bacteria: Many          RADIOLOGY & ADDITIONAL TESTS:      
HPI/OVERNIGHT EVENTS: Patient seen and examined at bedside this AM. states she has BM , passing flatus, her pain and abdominal exam improved     Vital Signs Last 24 Hrs  T(C): 36.8 (22 May 2022 04:31), Max: 37.1 (21 May 2022 18:52)  T(F): 98.3 (22 May 2022 04:31), Max: 98.7 (21 May 2022 18:52)  HR: 98 (22 May 2022 04:31) (85 - 98)  BP: 137/91 (22 May 2022 04:31) (106/67 - 137/91)  BP(mean): --  RR: 18 (22 May 2022 04:31) (18 - 18)  SpO2: 98% (22 May 2022 04:31) (96% - 100%)    I&O's Detail      GEN: NAD, laying in bed, appears stated age  HEENT: NCAT, clear oral mucosa, normal conjunctiva. Right chest port  Chest: non-tender  CV:  non-tachycardic, 2+ radial pulse  Pulm: no increased work of breathing, no conversational dyspnea  GI: soft, non-tender, nondistended. mild discomfort with deep lower abdominal palpation  MSK: moving all extremities     LABS:                        10.3   3.59  )-----------( 204      ( 21 May 2022 09:39 )             31.2     05-21    134<L>  |  98  |  14.0  ----------------------------<  135<H>  3.8   |  19.0<L>  |  0.41<L>    Ca    8.4<L>      21 May 2022 09:39    TPro  7.5  /  Alb  4.3  /  TBili  0.7  /  DBili  x   /  AST  60<H>  /  ALT  69<H>  /  AlkPhos  100  05-20    PT/INR - ( 20 May 2022 17:20 )   PT: 14.2 sec;   INR: 1.22 ratio         PTT - ( 20 May 2022 17:20 )  PTT:27.7 sec  Urinalysis Basic - ( 20 May 2022 17:33 )    Color: Yellow / Appearance: Clear / S.020 / pH: x  Gluc: x / Ketone: Trace  / Bili: Small / Urobili: 1   Blood: x / Protein: 30 mg/dL / Nitrite: Positive   Leuk Esterase: Moderate / RBC: 6-10 /HPF / WBC 11-25 /HPF   Sq Epi: x / Non Sq Epi: Few / Bacteria: Many        MEDICATIONS  (STANDING):  bisacodyl Suppository 10 milliGRAM(s) Rectal once  cefTRIAXone   IVPB 1000 milliGRAM(s) IV Intermittent every 24 hours  enoxaparin Injectable 40 milliGRAM(s) SubCutaneous every 24 hours  gabapentin 300 milliGRAM(s) Oral daily  polyethylene glycol 3350 17 Gram(s) Oral daily  senna 2 Tablet(s) Oral at bedtime    MEDICATIONS  (PRN):  acetaminophen     Tablet .. 650 milliGRAM(s) Oral every 6 hours PRN Temp greater or equal to 38C (100.4F), Mild Pain (1 - 3)  aluminum hydroxide/magnesium hydroxide/simethicone Suspension 30 milliLiter(s) Oral every 4 hours PRN Dyspepsia  bisacodyl Suppository 10 milliGRAM(s) Rectal <User Schedule> PRN Constipation  magnesium citrate Oral Solution 1 Bottle Oral at bedtime PRN Constipation  melatonin 3 milliGRAM(s) Oral at bedtime PRN Insomnia  mineral oil enema 133 milliLiter(s) Rectal once PRN if no BM  morphine  - Injectable 2 milliGRAM(s) IV Push once PRN Moderate Pain (4 - 6)  ondansetron Injectable 4 milliGRAM(s) IV Push every 8 hours PRN Nausea and/or Vomiting      MICRO:   Cultures     STUDIES:   EKG, CXR, U/S, CT, MRI     
Patient is a 71y old  Female who presents with a chief complaint of UTI, stercoral colitis     Patient seen and examined in am , reports 2 BM overnight small BM  denies abd pain , no nausea     reports  having small BM , no abdominal pain   tolerating diet     out of bed in commode during my second visit around 11 , c/o feeling slightly dizzy       ROS   constipation , no nausea , no abdominal pain     MEDICATIONS  (STANDING):  cefTRIAXone   IVPB 1000 milliGRAM(s) IV Intermittent every 24 hours  enoxaparin Injectable 40 milliGRAM(s) SubCutaneous every 24 hours  gabapentin 300 milliGRAM(s) Oral daily  polyethylene glycol 3350 17 Gram(s) Oral daily  senna 2 Tablet(s) Oral at bedtime    Vital Signs Last 24 Hrs  T(C): 36.8 (22 May 2022 09:05), Max: 37.1 (21 May 2022 18:52)  T(F): 98.3 (22 May 2022 09:05), Max: 98.7 (21 May 2022 18:52)  HR: 95 (22 May 2022 09:05) (88 - 98)  BP: 103/68 (22 May 2022 09:05) (103/68 - 137/91)  BP(mean): --  RR: 19 (22 May 2022 09:05) (18 - 19)  SpO2: 94% (22 May 2022 09:05) (94% - 100%)    PHYSICAL EXAM:  General: awake alert , no distress   Neck: supple, no JVD   Lungs: CTA bilateral   Cardio: RRR, S1/S2, No murmur  Abdomen: Soft, Nontender, Nondistended; Bowel sounds present  Extremities: No calf tenderness, No pitting edema  Skin : warm pale color   Rectal : no stool in vault , perirectal area normal   ulCulture - Urine (05.21.22 @ 00:37)   Specimen Source: Clean Catch Clean Catch (Midstream)   Culture Results:   50,000 - 99,000 CFU/mL Escherichia coli       Historical Values

## 2022-05-22 NOTE — DISCHARGE NOTE PROVIDER - HOSPITAL COURSE
70yo F with PMHx of Hodgkin lymphoma on chemotherapy (LD 5/12/22), Breast CA s/p b/l mastectomy, neuropathy presented to ED c/o abdominal pain. Pt reports to having constipation with chemotherapy. Patient reports 2 days of low abdominal/pelvic discomfort with associated poor oral intake. No bowel movement since Monday. Passing flatus. No fevers, chill, chest paint or shortness of breath. Pt also report to fever of 101 at home. In the ED afebrile, no leukocytosis, found to have UTI on UA, CT A/p with large stool burden/stercoral colitis. Attempted disimpaction but unable to in the ED, seen by colorectal surgery team , rec stool softener , enema .laxative as needed      Pt is improving , iv rocephin initiated for UTI , cx positive E coli , afebrile   improved constipation having bowel movements , stable for discharge home today     total time spend coordinating care 35 min

## 2022-05-22 NOTE — DISCHARGE NOTE NURSING/CASE MANAGEMENT/SOCIAL WORK - NSDCVIVACCINE_GEN_ALL_CORE_FT
influenza, injectable, quadrivalent, preservative free; 13-Sep-2018 06:18; Tran Hernandez (RN); [employer]; [id number] (Exp. Date: 30-Jun-2019); IntraMuscular; Deltoid Left.; 0.5 milliLiter(s); VIS (VIS Published: 07-Aug-2015, VIS Presented: 13-Sep-2018);

## 2022-05-22 NOTE — DISCHARGE NOTE NURSING/CASE MANAGEMENT/SOCIAL WORK - NSDCPEFALRISK_GEN_ALL_CORE
For information on Fall & Injury Prevention, visit: [email or web address] OR  [email or web address] OR  https://www.cdc.gov/steadi/patient.html

## 2022-05-22 NOTE — DISCHARGE NOTE PROVIDER - NSDCMRMEDTOKEN_GEN_ALL_CORE_FT
Cipro 250 mg oral tablet: 1 tab(s) orally every 12 hours start 5/23   Compazine 10 mg oral tablet: orally every 6 hours, As Needed  gabapentin 300 mg oral capsule: 1 cap(s) orally once a day  lactulose 10 g/15 mL oral syrup: 15 milliliter(s) orally once a day as needed  MiraLax oral powder for reconstitution: 17 gram(s) orally once a day for constipation   Multiple Vitamins oral tablet, dispersible: orally once a day  Percocet: orally every 8 hours, As Needed  Senna 8.6 mg oral tablet: 2 tab(s) orally once a day (at bedtime)  Vitamin C 1000 mg oral tablet: 1 tab(s) orally once a day  Vitamin D3 50 mcg (2000 intl units) oral capsule: 1 cap(s) orally once a day

## 2022-05-22 NOTE — DISCHARGE NOTE PROVIDER - NSDCCPCAREPLAN_GEN_ALL_CORE_FT
PRINCIPAL DISCHARGE DIAGNOSIS  Diagnosis: Acute UTI  Assessment and Plan of Treatment:       SECONDARY DISCHARGE DIAGNOSES  Diagnosis: Constipation  Assessment and Plan of Treatment: improving , continue miralax , senna , laxative as needed    Diagnosis: Stercoral colitis  Assessment and Plan of Treatment: avoid constipation   use oil enema for severe constipation as needed

## 2022-05-22 NOTE — PROGRESS NOTE ADULT - ASSESSMENT
70yo F with PMHx of Hodgkin lymphoma on chemotherapy (LD 5/12/22), Breast CA s/p b/l mastectomy, neuropathy presented to ED c/o abdominal pain. Pt reports to having constipation with chemotherapy.     1- Suspected UTI, e coli positive   on iv rocephin # 2   cefepimex1 on admission   cx result final sensitivity is P     2-Abdominal pain due to constipation    stercoral colitis on CT   cont laxative and stool softener  enema as needed   -pain control prn     3-AG metabolic acidosis probably due to  dehydration   encourage oral fluid intake   s/p ivf   repeat BMP in am     4-Neuropathy  -cont gabapentin    5- h/o Lymphoma on chemo   h/o breast cancer     DVT ppx  lovenox        ambulate   OOB   discharge in 24 hours likely       
71F with hodgkin lymphoma, constipation on Dulcolax and colace who presents with worsening constipation with thickening of colon with large recto-sigmoid stool burden concerning for stercoral colitis. Vital stable, without leukocytosis or lactic acidosis and soft abdominal exam  - No surgical intervention at this time  - Given immunocompromised state with Lymphoma on chemotherapy would watch closely  - Bowel regimen, enema and suppository  - Serial abdominal exams    
70yo F with PMHx of Hodgkin lymphoma on chemotherapy (LD 5/12/22), Breast CA s/p b/l mastectomy, neuropathy presented to ED c/o abdominal pain. Pt reports to having constipation with chemotherapy.     1- Suspected UTI  cont iv abx   cx and sensitivity     2-Abdominal pain due to constipation    stercoral colitis on CT   cont laxative and stool softener  enema as needed   -pain control prn     3-AG metabolic acidosis probable dehydration   s/p iv fluid   monitor BMP   cont oral diet fluid intake     4-Neuropathy  -cont gabapentin    5- h/o Lymphoma on chemo   h/o breast cancer     DVT ppx  lovenox        ambulate   OOB   discharge in 2-3 days likely

## 2022-05-22 NOTE — DISCHARGE NOTE NURSING/CASE MANAGEMENT/SOCIAL WORK - PATIENT PORTAL LINK FT
You can access the FollowMyHealth Patient Portal offered by [facility] by registering at the following website: [email or web address]. By joining [employer]’s FollowMyHealth portal, you will also be able to view your health information using other applications (apps) compatible with our system.

## 2022-05-23 ENCOUNTER — NON-APPOINTMENT (OUTPATIENT)
Age: 72
End: 2022-05-23

## 2022-05-25 LAB
CULTURE RESULTS: SIGNIFICANT CHANGE UP
CULTURE RESULTS: SIGNIFICANT CHANGE UP
SPECIMEN SOURCE: SIGNIFICANT CHANGE UP
SPECIMEN SOURCE: SIGNIFICANT CHANGE UP

## 2022-05-26 ENCOUNTER — APPOINTMENT (OUTPATIENT)
Age: 72
End: 2022-05-26

## 2022-05-27 ENCOUNTER — APPOINTMENT (OUTPATIENT)
Age: 72
End: 2022-05-27

## 2022-06-07 ENCOUNTER — APPOINTMENT (OUTPATIENT)
Dept: ULTRASOUND IMAGING | Facility: CLINIC | Age: 72
End: 2022-06-07
Payer: MEDICARE

## 2022-06-07 ENCOUNTER — RESULT REVIEW (OUTPATIENT)
Age: 72
End: 2022-06-07

## 2022-06-07 ENCOUNTER — OUTPATIENT (OUTPATIENT)
Dept: OUTPATIENT SERVICES | Facility: HOSPITAL | Age: 72
LOS: 1 days | End: 2022-06-07

## 2022-06-07 ENCOUNTER — APPOINTMENT (OUTPATIENT)
Dept: HEMATOLOGY ONCOLOGY | Facility: CLINIC | Age: 72
End: 2022-06-07
Payer: MEDICARE

## 2022-06-07 VITALS
WEIGHT: 127.01 LBS | DIASTOLIC BLOOD PRESSURE: 81 MMHG | HEIGHT: 64 IN | HEART RATE: 70 BPM | OXYGEN SATURATION: 100 % | BODY MASS INDEX: 21.68 KG/M2 | SYSTOLIC BLOOD PRESSURE: 150 MMHG

## 2022-06-07 DIAGNOSIS — Z98.890 OTHER SPECIFIED POSTPROCEDURAL STATES: Chronic | ICD-10-CM

## 2022-06-07 DIAGNOSIS — Z96.7 PRESENCE OF OTHER BONE AND TENDON IMPLANTS: Chronic | ICD-10-CM

## 2022-06-07 DIAGNOSIS — M79.604 PAIN IN RIGHT LEG: ICD-10-CM

## 2022-06-07 DIAGNOSIS — Z90.89 ACQUIRED ABSENCE OF OTHER ORGANS: Chronic | ICD-10-CM

## 2022-06-07 DIAGNOSIS — Z90.13 ACQUIRED ABSENCE OF BILATERAL BREASTS AND NIPPLES: Chronic | ICD-10-CM

## 2022-06-07 LAB
BASOPHILS # BLD AUTO: 0.1 K/UL — SIGNIFICANT CHANGE UP (ref 0–0.2)
BASOPHILS NFR BLD AUTO: 0.8 % — SIGNIFICANT CHANGE UP (ref 0–2)
EOSINOPHIL # BLD AUTO: 0.1 K/UL — SIGNIFICANT CHANGE UP (ref 0–0.5)
EOSINOPHIL NFR BLD AUTO: 1 % — SIGNIFICANT CHANGE UP (ref 0–6)
HCT VFR BLD CALC: 39.2 % — SIGNIFICANT CHANGE UP (ref 34.5–45)
HGB BLD-MCNC: 12.4 G/DL — SIGNIFICANT CHANGE UP (ref 11.5–15.5)
LYMPHOCYTES # BLD AUTO: 1.5 K/UL — SIGNIFICANT CHANGE UP (ref 1–3.3)
LYMPHOCYTES # BLD AUTO: 15.1 % — SIGNIFICANT CHANGE UP (ref 13–44)
MCHC RBC-ENTMCNC: 30.5 PG — SIGNIFICANT CHANGE UP (ref 27–34)
MCHC RBC-ENTMCNC: 31.6 G/DL — LOW (ref 32–36)
MCV RBC AUTO: 96.6 FL — SIGNIFICANT CHANGE UP (ref 80–100)
MONOCYTES # BLD AUTO: 0.7 K/UL — SIGNIFICANT CHANGE UP (ref 0–0.9)
MONOCYTES NFR BLD AUTO: 6.5 % — SIGNIFICANT CHANGE UP (ref 2–14)
NEUTROPHILS # BLD AUTO: 7.8 K/UL — HIGH (ref 1.8–7.4)
NEUTROPHILS NFR BLD AUTO: 76.6 % — SIGNIFICANT CHANGE UP (ref 43–77)
PLATELET # BLD AUTO: 236 K/UL — SIGNIFICANT CHANGE UP (ref 150–400)
RBC # BLD: 4.06 M/UL — SIGNIFICANT CHANGE UP (ref 3.8–5.2)
RBC # FLD: 14.6 % — HIGH (ref 10.3–14.5)
WBC # BLD: 10.2 K/UL — SIGNIFICANT CHANGE UP (ref 3.8–10.5)
WBC # FLD AUTO: 10.2 K/UL — SIGNIFICANT CHANGE UP (ref 3.8–10.5)

## 2022-06-07 PROCEDURE — 93971 EXTREMITY STUDY: CPT | Mod: 26,RT

## 2022-06-07 PROCEDURE — 99214 OFFICE O/P EST MOD 30 MIN: CPT

## 2022-06-08 ENCOUNTER — NON-APPOINTMENT (OUTPATIENT)
Age: 72
End: 2022-06-08

## 2022-06-08 NOTE — CONSULT LETTER
[Dear  ___] : Dear  [unfilled], [Consult Letter:] : I had the pleasure of evaluating your patient, [unfilled]. [Please see my note below.] : Please see my note below. [Consult Closing:] : Thank you very much for allowing me to participate in the care of this patient.  If you have any questions, please do not hesitate to contact me. [Sincerely,] : Sincerely, [FreeTextEntry3] : Amita Weller MD\par Medical Oncology/Hematology\par [facility] [place], [facility]\par \par \par [facility] [employer] [facility]\par

## 2022-06-08 NOTE — ASSESSMENT
[FreeTextEntry1] : 71 year old female with remote history of R breast cancer in 1998 s/p CMF, and bilateral DCIS s/p b/l mastectomy in 2018, now presenting with Right paravertebral soft tissue lesion at the level of T12/L1 which was discovered on imaging s/p fall in 6/2021.  She is s/p core biopsy by IR on 9/14/21 which shows - suspicious for Hodgkin's lymphoma, favoring nodular lymphocyte predominant, however additional tissue needed for a definitive diagnosis. \par S/p repeat core biopsy on 10/18/21 --> consistent with classic Hodgkin's lymphoma. She has stage I favorable disease. She deferred starting treatment until February 2022. \par 12/2/2021 ECHO LV EF 60 to 65%\par 1/28/22 PET CT with no change compared to prior PET. \par From 2/4/22 - 5/13/22 received 4 cycles of AVD (cycle 4, day 15 deferred due to hospitalization for UTI)\par Interval PET had shown complete response.\par \par Now with post hospitalization fatigue and pain in R leg.\par CBC today shows WBC 10.2, Hgb 12.4, HCT 39%, plts 236K\par \par Plan:\par RLE pain: Referred to Dr. Cannon, US RLE to r/o DVT \par Lab B12/Thyroid ordered \par Port removal  in 3 weeks \par Follow up in 4 months after CT Scans\par \par

## 2022-06-08 NOTE — HISTORY OF PRESENT ILLNESS
[de-identified] : Ms. Wooten is a 72 y/o patient who is being seen for suspicion of Hogdkin's Lymphoma.  \par \par Her medical history is significant for a remote history of stage I ER+ right breast cancer in 1998, s/p lumpectomy, followed by CMF x 6 months and adjuvant RT. She took raloxifene for 5 years. 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 \par She has recurrent UTIs, spinal stenosis, RA.\par \par She is s/p fall in kitchen, 06/26/21. Admitted to hospital, fractured sacrum and fractured L2 (states that L2 fracture might have previously been there).\par 6/30/21 MRI sacrum - Acute H-shaped sacral fracture. Advanced bilateral hip osteoarthritis. Small to moderate-sized bilateral hip joint effusions with synovitis.\par 6/30/21 MRI thoracic spine -Multilevel small thoracic spine disc herniations with cord impingement. No cord signal abnormality.\par Chronic L2 superior anterior corner fracture.\par Probable nondisplaced sacrococcygeal fracture with presacral edema. Dedicated bony pelvis MRI CT is recommended.\par Right paravertebral soft tissue lesion at the level of T12/L1 suggests nerve sheath tumor. Nonemergent postcontrast images are recommended.\par \par She was referred to Dr. Interiano and subsequently had a core needle biopsy of the right paravertebral soft tissue mass at T12/L1 on 9/14/21. \par Pathology - suspicious for Hodgkins' lymphoma, favor nodular lymphocyte predominant. However additional tissue is recommended for further evaluation. \par \par \par PET/CT Skull-Thigh 09/30/21: IMPRESSION: FDG-PET/CT scan:\par 1. FDG avid oval soft tissue along the right thoracic paraspinal region, corresponding to the enhancing oval mass on MRI 8/2/2021.\par 2. Linear FDG avidity in the right sacral ala, corresponding to insufficiency fracture, also noted on MRI.\par 3. Minimal stranding adjacent to the anterior surface of the left breast implant, nonspecific. Please correlate clinically.\par \par T Cell Gene Rearrangement 09/22/21: TCR-beta: Negative, TCR-gamma: Negative\par Interpretation: No discrete bands were identified in either TCR-beta or TCR-gamma, suggesting a polycional T-cell population\par \par Pathology 09/14/21: Final Diagnosis\par 1.  Paraspinal mass, right, core biopsies:\par - Suspicious for Hodgkin Lymphoma.\par  \par MRI Lumbar Spine 08/02/21: IMPRESSION:\par 1. Enhancing oval soft tissue mass is again seen along the inferior margin of the right 12th rib at the costovertebral junction, abutting the pleura, and without T12-L1 foraminal extension. This appears stable and a peripheral nerve sheath tumor may be considered as well as other processes including metastatic disease. Correlation with older imaging is suggested if available to assess stability. Consider PET CT or CT chest abdomen and pelvis for further evaluation or follow-up MRI to assess stability.\par 2. Presumed progression of the sacral insufficiency fractures with diffuse marrow edema and marrow replacement of the right sacral ala. Superimposed metastatic disease could not be absolutely excluded. CT may be helpful for further characterization or follow-up MRI to assess stability and to assess healing.\par \par Currently, taking Percocet at night for pain in the sacrum. Since 2015, has been on Gabapentin due to shingles. Also taking Methenamine for UTI. \par No fever, night sweats.Admits intermittent hot flashes. Difficulty sleeping due to pain in sacrum.\par \par PCP - Romaine Guzman \par \par  [de-identified] : Patient presents for follow-up \par Returns for follow up visit\par S/p hospitalized at [facility] from 5/20/22 - 5/22/22 for UTI, stercoral colitis of the intestine \par Reports significant fatigue since discharge\par Intermittent Nausea\par Stable appetite\par Has R leg pain, pain radiates from hip down to the leg. No relief with Gabapentin or Percocet. Notes that tylenol with jammie worked for her in the hospital.\par History of sacrum fracture. \par No diarrhea, fever  \par No abdomen pain, normal bowel movements \par Inquiring about port removal.\par

## 2022-06-08 NOTE — REVIEW OF SYSTEMS
[FreeTextEntry2] : see interval hx, otherwise remainder of ROS is negative 
uncomplicated postoperative course

## 2022-06-08 NOTE — ADDENDUM
[FreeTextEntry1] : Documented by Arnaldo Aguiar acting as scribe for Dr. Weller on 06/07/2022. \par \par All Medical record entries made by the Scribe were at my, Dr. Weller's, direction and personally dictated by me on 06/07/2022. I have reviewed the chart and agree that the record accurately reflects my personal performance of the history, physical exam, assessment and plan. I have also personally directed, reviewed, and agreed with the discharge instructions.

## 2022-06-09 ENCOUNTER — APPOINTMENT (OUTPATIENT)
Age: 72
End: 2022-06-09

## 2022-06-10 LAB
ALBUMIN SERPL ELPH-MCNC: 5 G/DL
ALP BLD-CCNC: 62 U/L
ALT SERPL-CCNC: 19 U/L
ANION GAP SERPL CALC-SCNC: 16 MMOL/L
AST SERPL-CCNC: 22 U/L
BILIRUB SERPL-MCNC: 0.3 MG/DL
BUN SERPL-MCNC: 25 MG/DL
CALCIUM SERPL-MCNC: 9.9 MG/DL
CHLORIDE SERPL-SCNC: 106 MMOL/L
CO2 SERPL-SCNC: 23 MMOL/L
CREAT SERPL-MCNC: 0.67 MG/DL
EGFR: 93 ML/MIN/1.73M2
ERYTHROCYTE [SEDIMENTATION RATE] IN BLOOD BY WESTERGREN METHOD: 16 MM/HR
FOLATE SERPL-MCNC: 17.2 NG/ML
GLUCOSE SERPL-MCNC: 99 MG/DL
LDH SERPL-CCNC: 165 U/L
POTASSIUM SERPL-SCNC: 5.3 MMOL/L
PROT SERPL-MCNC: 7.5 G/DL
SODIUM SERPL-SCNC: 144 MMOL/L
TSH SERPL-ACNC: 1.13 UIU/ML
VIT B12 SERPL-MCNC: 426 PG/ML

## 2022-06-13 ENCOUNTER — RESULT REVIEW (OUTPATIENT)
Age: 72
End: 2022-06-13

## 2022-06-19 ENCOUNTER — NON-APPOINTMENT (OUTPATIENT)
Age: 72
End: 2022-06-19

## 2022-06-21 ENCOUNTER — OUTPATIENT (OUTPATIENT)
Dept: OUTPATIENT SERVICES | Facility: HOSPITAL | Age: 72
LOS: 1 days | Discharge: HOME | End: 2022-06-21

## 2022-06-21 DIAGNOSIS — Z98.890 OTHER SPECIFIED POSTPROCEDURAL STATES: Chronic | ICD-10-CM

## 2022-06-21 DIAGNOSIS — Z90.89 ACQUIRED ABSENCE OF OTHER ORGANS: Chronic | ICD-10-CM

## 2022-06-21 DIAGNOSIS — Z90.13 ACQUIRED ABSENCE OF BILATERAL BREASTS AND NIPPLES: Chronic | ICD-10-CM

## 2022-06-21 DIAGNOSIS — Z96.7 PRESENCE OF OTHER BONE AND TENDON IMPLANTS: Chronic | ICD-10-CM

## 2022-06-21 DIAGNOSIS — C81.90 HODGKIN LYMPHOMA, UNSPECIFIED, UNSPECIFIED SITE: ICD-10-CM

## 2022-06-24 ENCOUNTER — APPOINTMENT (OUTPATIENT)
Dept: HEMATOLOGY ONCOLOGY | Facility: CLINIC | Age: 72
End: 2022-06-24

## 2022-06-24 ENCOUNTER — LABORATORY RESULT (OUTPATIENT)
Age: 72
End: 2022-06-24

## 2022-06-29 ENCOUNTER — APPOINTMENT (OUTPATIENT)
Dept: INTERVENTIONAL RADIOLOGY/VASCULAR | Facility: CLINIC | Age: 72
End: 2022-06-29

## 2022-06-29 ENCOUNTER — OUTPATIENT (OUTPATIENT)
Dept: OUTPATIENT SERVICES | Facility: HOSPITAL | Age: 72
LOS: 1 days | End: 2022-06-29

## 2022-06-29 VITALS
RESPIRATION RATE: 16 BRPM | TEMPERATURE: 97.4 F | SYSTOLIC BLOOD PRESSURE: 150 MMHG | OXYGEN SATURATION: 99 % | HEART RATE: 81 BPM | DIASTOLIC BLOOD PRESSURE: 88 MMHG

## 2022-06-29 DIAGNOSIS — Z98.890 OTHER SPECIFIED POSTPROCEDURAL STATES: Chronic | ICD-10-CM

## 2022-06-29 DIAGNOSIS — Z00.8 ENCOUNTER FOR OTHER GENERAL EXAMINATION: ICD-10-CM

## 2022-06-29 DIAGNOSIS — Z96.7 PRESENCE OF OTHER BONE AND TENDON IMPLANTS: Chronic | ICD-10-CM

## 2022-06-29 DIAGNOSIS — Z90.89 ACQUIRED ABSENCE OF OTHER ORGANS: Chronic | ICD-10-CM

## 2022-06-29 DIAGNOSIS — Z90.13 ACQUIRED ABSENCE OF BILATERAL BREASTS AND NIPPLES: Chronic | ICD-10-CM

## 2022-06-29 PROCEDURE — 36590 REMOVAL TUNNELED CV CATH: CPT

## 2022-06-30 NOTE — HISTORY OF PRESENT ILLNESS
[FreeTextEntry1] : PRE CALL PRIOR TO APPOINTMENT:  \par \par  Phone Number: [phone]\par \par  COVID-19 SWAB:  Advised\par \par  RX on file:  yes\par \par  Referring MD:  Mima\par \par  Appointment date:  6/29/22\par \par  Currently on Chemotherapy: no             \par \par  Diabetic:  no\par \par  Clotting or Bleeding disorders:  \par \par  PPM / Defibrillator:  no\par \par  NPO status advised:  yes\par \par  History of fall:  no   \par \par  Assistant device for walking:  no\par \par   home:  yes\par \par  Blood Thinners:  no\par \par  Prescribing MD agree to have medication held:  na\par \par  Capacity to make decisions: yes\par \par  HCP:  no\par \par  DNR:  no\par \par  Person contact for Pre-Call:  Patient by Satinder STRATTON\par \par  \par \par  Guidelines for Screening Anesthesia / Sedation Cases	(TYPE YES OR NO)  \par \par   Obtain Prescription / Authorization from Referring Physician: YES	 \par \par Medical Necessity (Justification of the need for Anesthesia / Sedation) from referring Physician: YES	 \par \par  Have you taken oral sedation? (e.g. Xanax, Valium, and other oral sedatives):  NO	 \par \par  Clearance to receive Anesthesia / Sedation: YES- BY Dr. Wang 	 \par \par  \par \par  ANESTHESIA EXCLUSION CRITERIA QUESTIONNAIRE:	 \par \par Difficult Airway: (TYPE YES OR NO) 	 \par \par Previous Problem with Anesthesia or sedation: NO	 \par \par  History of Difficult IntubatioN: NO	 \par \par  Stridor, Snoring, Sleep Apnea: NO	 \par \par  Tonsils / Adenoids present: YES	 \par \par  Dysmorphic Facial Features: NO	 \par \par  Cervical Spine Fusion/ Cervical Spine Surgery: NO	 \par \par  Tumor in Airway: NO	 \par \par  Trauma to Airway: NO	 \par \par  Radiation therapy to head / neck: NO	 \par \par  Advanced Rheumatoid ArthritiS: NO	 \par \par  Active Cardiac Ischemia (as defined by EKG or Laboratory  Examination): NO	 \par \par  Severe COPD / Home O2: NO	 \par \par  Known or Suspected Increased Intracranial pressure: NO	 \par \par  Patient with BMI of 40 or greater : NO    Weight (kgs.) _57.72_ Height (ft.) _5'4"_       BMI___21.8_	 	 \par \par  Patients with Increased Risk of Aspiration: (TYPE YES OR NO) 		 \par \par  Abnormal Airway: NO	 \par \par  Hiatal Hernia with Reflux: NO	 \par \par  Pregnancy: NO	 	 \par \par  Altered Mental State: NO 	 	 \par \par  Spinal Cord Injury with Paraplegia or Quadriplegia: NO	 	 \par \par  History of delayed Gastric Emptying: NO 	 	 \par \par  ASA Physical Status of 3 or greater (See Appendix 1 from policy [id number].5502) 	 	 \par \par \par Malignant Hyperthermia Screening: (TYPE YES OR NO) 	 \par \par Family history unexpected death following anesthesia: NO	 \par \par  Family or personal history of Malignant Hyperthermia: NO  	 \par \par   A muscle or neuromuscular disorder: NO 	 \par \par  \par \par  Monique -Operative Assessment ( Day of Procedure)  \par  \par  Procedure:  Mediport Removal\par \par  Indication:  \par \par  NPO status: 6/28/22 @2000\par \par  Accompanied by:  Izzy\par \par  Falls risk:  na\par \par  Labs: PLT:      INR:      PT:      CR:      Potassium:  SEE BELOW\par \par  IVL:  22g LAC\par \par  IR MD: Dr. Nancy Davis  \par \par  Urine Pregnancy: na\par \par  Pre-Op instructions: provided\par \par  POST-OP teaching initiated:  yes\par \par  Allergy bracelet on: yes\par \par  Anesthesia plan discussed with IR MD:  kellee\par \par  Antibiotic given: na\par

## 2022-07-21 ENCOUNTER — APPOINTMENT (OUTPATIENT)
Age: 72
End: 2022-07-21

## 2022-07-22 ENCOUNTER — APPOINTMENT (OUTPATIENT)
Dept: PHYSICAL MEDICINE AND REHAB | Facility: CLINIC | Age: 72
End: 2022-07-22

## 2022-07-22 PROCEDURE — 99214 OFFICE O/P EST MOD 30 MIN: CPT

## 2022-07-22 NOTE — PHYSICAL EXAM
[FreeTextEntry1] : Gen: Patient is A&O x 3, NAD\par HEENT: EOMI, hearing grossly normal\par Resp: regular, non - labored\par CV: pulses regular\par Skin: no rashes, erythema\par Lymph: no clubbing, cyanosis, edema, or palpable lymphadenopathy\par Inspection: no instability or misalignment\par ROM: full throughout, no pain with hip ROM, no pain with lumbar spine flexion/extension \par Palpation: Dysesthesia to light touch in bilateral feet \par Sensation: Decreased to light touch in bilateral hands and feet R>L LE \par Reflexes: 1+ and symmetric throughout\par Strength: 5/5 throughout, except 4/5 in R hip flexion\par Special tests: -straight leg raise\par Gait: normal, non-antalgic, difficulty standing tandem \par \par

## 2022-07-22 NOTE — ASSESSMENT
[FreeTextEntry1] : 72 year old female presenting for evaluation.\par \par #Neuropathy:\par -Discontinue gabapentin given side effects\par -Start pregabalin 50mg QHS\par -I Stop # [id number]\par -May also continue to use CBD products including topical formulations for neuropathy\par \par #Impaired mobility:\par -PT Rx for balance, ROM and gait training with precautions given history of sacral insufficiency fracture\par -PET/CT reviewed\par \par \par Follow up in 1 month.  \par

## 2022-07-25 ENCOUNTER — APPOINTMENT (OUTPATIENT)
Dept: UROGYNECOLOGY | Facility: CLINIC | Age: 72
End: 2022-07-25

## 2022-07-25 ENCOUNTER — RESULT CHARGE (OUTPATIENT)
Age: 72
End: 2022-07-25

## 2022-07-25 VITALS — SYSTOLIC BLOOD PRESSURE: 136 MMHG | DIASTOLIC BLOOD PRESSURE: 88 MMHG

## 2022-07-25 DIAGNOSIS — K59.00 CONSTIPATION, UNSPECIFIED: ICD-10-CM

## 2022-07-25 DIAGNOSIS — N39.44 NOCTURNAL ENURESIS: ICD-10-CM

## 2022-07-25 DIAGNOSIS — N81.9 FEMALE GENITAL PROLAPSE, UNSPECIFIED: ICD-10-CM

## 2022-07-25 LAB
BILIRUB UR QL STRIP: NORMAL
CLARITY UR: CLEAR
COLLECTION METHOD: NORMAL
GLUCOSE UR-MCNC: NORMAL
HCG UR QL: 0.2 EU/DL
HGB UR QL STRIP.AUTO: NORMAL
KETONES UR-MCNC: NORMAL
LEUKOCYTE ESTERASE UR QL STRIP: NORMAL
NITRITE UR QL STRIP: NORMAL
PH UR STRIP: 5.5
PROT UR STRIP-MCNC: NORMAL
SP GR UR STRIP: 1.02

## 2022-07-25 PROCEDURE — 99214 OFFICE O/P EST MOD 30 MIN: CPT | Mod: 25

## 2022-07-25 PROCEDURE — 81003 URINALYSIS AUTO W/O SCOPE: CPT | Mod: QW

## 2022-07-25 PROCEDURE — 51701 INSERT BLADDER CATHETER: CPT

## 2022-07-25 NOTE — HISTORY OF PRESENT ILLNESS
[Cystocele (Obstetric)] : no [Vaginal Wall Prolapse] : no [Rectal Prolapse] : no [Stress Incontinence] : moderate [Unable To Restrain Bowel Movement] : moderate [Urinary Frequency] : mild [x3+] : nocturia three or more  times a night [Feelings Of Urinary Urgency] : no [Pain During Urination (Dysuria)] : no [Urinary Tract Infection] : moderate [Hematuria] : no [Constipation Obstructed Defecation] : moderate [Pelvic Pain] : no [Vaginal Pain] : no [Bladder Pain] : no [Rectal Pain] : no [FreeTextEntry1] : \par \par Pt last seen 12/2020.  She presents to office with c/o nocturia 3-4 times/night consistently, now 5-6x for the last few days. + nocturnal enuresis, wetting pads for "quite a while"\par + dysuria\par went to urgent care 6/20 and was told to take macrobid, no culture results available and UA neg.  Frequency did decrease after treatment\par Day time frequency "not as bad", wearing pads all day but not always wet.  Can make it to the bathroom during the day. \par Diagnosed with lymphoma and just finished chemo 2 months ago\par  had COVID 1/2021 at age 89, now doing well but pt was unable to come to office sooner as she was caring for him.\par 2018 recurrence of breast cancer and had BL mastectomy\par Hospitalized 5/2022 with colitis, at the time was treated for UTI with IV abx, was given keflex to take home and she did take one pill this morning.  Reports dysuria did improve.\par Denies gross hematuria, back pain, fever or chills\par \par Hx POP, Has RS #3 pessary- performing self care- excellent prolapse support.  Reports occasional spotting for which she leaves the pessary out x 3-5 days and it resolves.  Has vaginal estrogen (which was ok'd by oncologist) which she will use when she has some spotting but has not used this "in a while".  Denies any subjective feelings of incomplete emptying.  No pelvic pain or pressure.  Notes that it is not helping with urgency/UUI and she thinks it did in the past.  Notes some discharge, using trimosan which helps.\par \par Fluid intake:\par Water 16oz\par coffee 8oz\par occasional seltzer\par No BOUBACAR\par \par hx IBS with constipation, had been on OAB meds in the past (?oxybutynin which didn't help and myrbetriq which wasn't covered and was "hundreds of dollars per month), afraid to take ACH d/t constipation and dementia risk.\par \par On methenamine nightly.  Has gotten refills from her PMD

## 2022-07-25 NOTE — PROCEDURE
[Straight Catheterization] : insertion of a straight catheter [Urinary Tract Infection] : a urinary tract infection [Patient] : the patient [___ Fr Straight Tip] : a [unfilled] in [place] straight tip catheter [Cloudy] : cloudy [Culture] : culture [Urinalysis] : urinalysis [No Complications] : no complications [Tolerated Well] : the patient tolerated the procedure well [Post procedure instructions and information given] : Post procedure instructions and information were given and reviewed with patient. [0] : 0 [Good Fit] : fits well [Discharge] : there is vaginal discharge [Pessary Inserted] : inserted [H2O] : H2O [None] : no bleeding [Erosion] : no evidence of erosion [Erythema] : no erythema [Infection] : no evidence of infection [FreeTextEntry9] : PVR 30 ml [FreeTextEntry1] : RS with k nob #3

## 2022-07-25 NOTE — PHYSICAL EXAM
[No Acute Distress] : in no acute distress [Well developed] : well developed [Well Nourished] : ~L well nourished [Good Hygeine] : demonstrates good hygeine [Oriented x3] : oriented to person, place, and time [Normal Memory] : ~T memory was ~L unimpaired [Normal Mood/Affect] : mood and affect are normal [Soft, Nontender] : the abdomen was soft and nontender [None] : no CVA tenderness [Warm and Dry] : was warm and dry to touch [Normal Gait] : gait was normal [Vulvar Atrophy] : vulvar atrophy [Normal] : was normal [Normal Appearance] : general appearance was normal [Atrophy] : atrophy [Discharge] : a  ~M vaginal discharge was present [Scant] : scant [White] : white

## 2022-07-25 NOTE — DISCUSSION/SUMMARY
[FreeTextEntry1] : In office dip indicates likely UTI.  Will send UA CS.  We discussed treating empirically vs waiting for culture results.  Pt has keflex from the hospital and would like to start course.  She will take 500mg BID x 7 days.\par \par Discussed increasing methenamine to BID and using vaginal estrogen consistently BIW.\par \par OAB- if symptoms not improved after abx, we discussed management for OAB including behavioral modifications, timed voids, medications and third line options.  Samples of gemtesa were given to her and she will try this.  She will f/u 4 weeks after starting medications if it is covered.  If not covered or needs PA, pt will call office.  Instructed to call with any questions or concerns and she verbalizes understanding. \par \par I spent 45 minutes with pt.

## 2022-07-28 LAB
APPEARANCE: ABNORMAL
BACTERIA UR CULT: NORMAL
BACTERIA: NEGATIVE
BILIRUBIN URINE: NEGATIVE
BLOOD URINE: NEGATIVE
COLOR: YELLOW
GLUCOSE QUALITATIVE U: NEGATIVE
HYALINE CASTS: 0 /LPF
KETONES URINE: NEGATIVE
LEUKOCYTE ESTERASE URINE: ABNORMAL
MICROSCOPIC-UA: NORMAL
NITRITE URINE: NEGATIVE
PH URINE: 6
PROTEIN URINE: ABNORMAL
RED BLOOD CELLS URINE: 3 /HPF
SPECIFIC GRAVITY URINE: 1.03
SQUAMOUS EPITHELIAL CELLS: 1 /HPF
UROBILINOGEN URINE: NORMAL
WHITE BLOOD CELLS URINE: 133 /HPF

## 2022-09-01 ENCOUNTER — APPOINTMENT (OUTPATIENT)
Age: 72
End: 2022-09-01

## 2022-09-16 ENCOUNTER — RESULT REVIEW (OUTPATIENT)
Age: 72
End: 2022-09-16

## 2022-10-11 ENCOUNTER — OUTPATIENT (OUTPATIENT)
Dept: OUTPATIENT SERVICES | Facility: HOSPITAL | Age: 72
LOS: 1 days | End: 2022-10-11

## 2022-10-11 ENCOUNTER — APPOINTMENT (OUTPATIENT)
Dept: CT IMAGING | Facility: CLINIC | Age: 72
End: 2022-10-11

## 2022-10-11 DIAGNOSIS — C81.90 HODGKIN LYMPHOMA, UNSPECIFIED, UNSPECIFIED SITE: ICD-10-CM

## 2022-10-11 DIAGNOSIS — Z96.7 PRESENCE OF OTHER BONE AND TENDON IMPLANTS: Chronic | ICD-10-CM

## 2022-10-11 DIAGNOSIS — Z98.890 OTHER SPECIFIED POSTPROCEDURAL STATES: Chronic | ICD-10-CM

## 2022-10-11 DIAGNOSIS — Z90.13 ACQUIRED ABSENCE OF BILATERAL BREASTS AND NIPPLES: Chronic | ICD-10-CM

## 2022-10-11 DIAGNOSIS — Z90.89 ACQUIRED ABSENCE OF OTHER ORGANS: Chronic | ICD-10-CM

## 2022-10-11 PROCEDURE — 71260 CT THORAX DX C+: CPT | Mod: 26

## 2022-10-11 PROCEDURE — 70491 CT SOFT TISSUE NECK W/DYE: CPT | Mod: 26

## 2022-10-11 PROCEDURE — 74177 CT ABD & PELVIS W/CONTRAST: CPT | Mod: 26

## 2022-10-11 NOTE — ED ADULT TRIAGE NOTE - NS ED TRIAGE AVPU SCALE
Alert-The patient is alert, awake and responds to voice. The patient is oriented to time, place, and person. The triage nurse is able to obtain subjective information. Advancement-Rotation Flap Text: The defect edges were debeveled with a #15 scalpel blade.  Given the location of the defect, shape of the defect and the proximity to free margins an advancement-rotation flap was deemed most appropriate.  Using a sterile surgical marker, an appropriate flap was drawn incorporating the defect and placing the expected incisions within the relaxed skin tension lines where possible. The area thus outlined was incised deep to adipose tissue with a #15 scalpel blade.  The skin margins were undermined to an appropriate distance in all directions utilizing iris scissors.

## 2022-10-13 ENCOUNTER — OUTPATIENT (OUTPATIENT)
Dept: OUTPATIENT SERVICES | Facility: HOSPITAL | Age: 72
LOS: 1 days | Discharge: HOME | End: 2022-10-13

## 2022-10-13 ENCOUNTER — APPOINTMENT (OUTPATIENT)
Age: 72
End: 2022-10-13

## 2022-10-13 DIAGNOSIS — C81.90 HODGKIN LYMPHOMA, UNSPECIFIED, UNSPECIFIED SITE: ICD-10-CM

## 2022-10-13 DIAGNOSIS — Z96.7 PRESENCE OF OTHER BONE AND TENDON IMPLANTS: Chronic | ICD-10-CM

## 2022-10-13 DIAGNOSIS — Z98.890 OTHER SPECIFIED POSTPROCEDURAL STATES: Chronic | ICD-10-CM

## 2022-10-13 DIAGNOSIS — Z90.89 ACQUIRED ABSENCE OF OTHER ORGANS: Chronic | ICD-10-CM

## 2022-10-13 DIAGNOSIS — Z90.13 ACQUIRED ABSENCE OF BILATERAL BREASTS AND NIPPLES: Chronic | ICD-10-CM

## 2022-10-17 ENCOUNTER — APPOINTMENT (OUTPATIENT)
Dept: HEMATOLOGY ONCOLOGY | Facility: CLINIC | Age: 72
End: 2022-10-17

## 2022-10-17 VITALS
OXYGEN SATURATION: 99 % | HEIGHT: 64 IN | WEIGHT: 129.05 LBS | BODY MASS INDEX: 22.03 KG/M2 | SYSTOLIC BLOOD PRESSURE: 134 MMHG | DIASTOLIC BLOOD PRESSURE: 87 MMHG | HEART RATE: 69 BPM

## 2022-10-17 PROCEDURE — 99214 OFFICE O/P EST MOD 30 MIN: CPT

## 2022-10-17 NOTE — ASSESSMENT
[FreeTextEntry1] : 71 year old female with remote history of R breast cancer in 1998 s/p CMF, and bilateral DCIS s/p b/l mastectomy in 2018, now presenting with Right paravertebral soft tissue lesion at the level of T12/L1 which was discovered on imaging s/p fall in 6/2021.  She is s/p core biopsy by IR on 9/14/21 which shows - suspicious for Hodgkin's lymphoma, favoring nodular lymphocyte predominant, however additional tissue needed for a definitive diagnosis. \par S/p repeat core biopsy on 10/18/21 --> consistent with classic Hodgkin's lymphoma. She has stage I favorable disease. She deferred starting treatment until February 2022. \par From 2/4/22 - 5/13/22 received 4 cycles of AVD (cycle 4, day 15 deferred due to hospitalization for UTI).\par 4/6/22 PET showed complete response. \par \par 10/11/22 CT n/c/a/p - 6 mm thyroid nodule. No significant lymphadenopathy. Mild intrahepatic biliary dilatation of unclear etiology. CBD 8 mm\par \par \par Plan:\par Advised GI follow up with Dr. Romero re: biliary dilatation\par Thyroid nodule -r eferral to endocrine and thyroid US ordered\par RTO 6 months after restaging scans. \par

## 2022-10-17 NOTE — HISTORY OF PRESENT ILLNESS
[de-identified] : Ms. Wooten is a 72 y/o patient who is being seen for suspicion of Hogdkin's Lymphoma.  \par \par Her medical history is significant for a remote history of stage I ER+ right breast cancer in 1998, s/p lumpectomy, followed by CMF x 6 months and adjuvant RT. She took raloxifene for 5 years. 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 \par She has recurrent UTIs, spinal stenosis, RA.\par \par She is s/p fall in kitchen, 06/26/21. Admitted to hospital, fractured sacrum and fractured L2 (states that L2 fracture might have previously been there).\par 6/30/21 MRI sacrum - Acute H-shaped sacral fracture. Advanced bilateral hip osteoarthritis. Small to moderate-sized bilateral hip joint effusions with synovitis.\par 6/30/21 MRI thoracic spine -Multilevel small thoracic spine disc herniations with cord impingement. No cord signal abnormality.\par Chronic L2 superior anterior corner fracture.\par Probable nondisplaced sacrococcygeal fracture with presacral edema. Dedicated bony pelvis MRI CT is recommended.\par Right paravertebral soft tissue lesion at the level of T12/L1 suggests nerve sheath tumor. Nonemergent postcontrast images are recommended.\par \par She was referred to Dr. Interiano and subsequently had a core needle biopsy of the right paravertebral soft tissue mass at T12/L1 on 9/14/21. \par Pathology - suspicious for Hodgkins' lymphoma, favor nodular lymphocyte predominant. However additional tissue is recommended for further evaluation. \par \par \par PET/CT Skull-Thigh 09/30/21: IMPRESSION: FDG-PET/CT scan:\par 1. FDG avid oval soft tissue along the right thoracic paraspinal region, corresponding to the enhancing oval mass on MRI 8/2/2021.\par 2. Linear FDG avidity in the right sacral ala, corresponding to insufficiency fracture, also noted on MRI.\par 3. Minimal stranding adjacent to the anterior surface of the left breast implant, nonspecific. Please correlate clinically.\par \par T Cell Gene Rearrangement 09/22/21: TCR-beta: Negative, TCR-gamma: Negative\par Interpretation: No discrete bands were identified in either TCR-beta or TCR-gamma, suggesting a polycional T-cell population\par \par Pathology 09/14/21: Final Diagnosis\par 1.  Paraspinal mass, right, core biopsies:\par - Suspicious for Hodgkin Lymphoma.\par  \par MRI Lumbar Spine 08/02/21: IMPRESSION:\par 1. Enhancing oval soft tissue mass is again seen along the inferior margin of the right 12th rib at the costovertebral junction, abutting the pleura, and without T12-L1 foraminal extension. This appears stable and a peripheral nerve sheath tumor may be considered as well as other processes including metastatic disease. Correlation with older imaging is suggested if available to assess stability. Consider PET CT or CT chest abdomen and pelvis for further evaluation or follow-up MRI to assess stability.\par 2. Presumed progression of the sacral insufficiency fractures with diffuse marrow edema and marrow replacement of the right sacral ala. Superimposed metastatic disease could not be absolutely excluded. CT may be helpful for further characterization or follow-up MRI to assess stability and to assess healing.\par \par Currently, taking Percocet at night for pain in the sacrum. Since 2015, has been on Gabapentin due to shingles. Also taking Methenamine for UTI. \par No fever, night sweats.Admits intermittent hot flashes. Difficulty sleeping due to pain in sacrum.\par \par PCP - Romaine Guzman \par \par  [de-identified] : Patient presents for follow-up \par Feels well overall\par Has continued neuropathy, is on gabapentin

## 2022-10-17 NOTE — PHYSICAL EXAM
[Normal] : RRR, normal S1S2, no murmurs, rubs, gallops [de-identified] : No leg swelling [de-identified] : supple

## 2022-10-17 NOTE — CONSULT LETTER
[Dear  ___] : Dear  [unfilled], [Consult Letter:] : I had the pleasure of evaluating your patient, [unfilled]. [Please see my note below.] : Please see my note below. [Consult Closing:] : Thank you very much for allowing me to participate in the care of this patient.  If you have any questions, please do not hesitate to contact me. [Sincerely,] : Sincerely, [FreeTextEntry3] : Amita Weller MD\par Medical Oncology/Hematology\par [facility] [place], [facility]\par \par \par [facility] [employer] [facility]\par

## 2022-10-24 ENCOUNTER — NON-APPOINTMENT (OUTPATIENT)
Age: 72
End: 2022-10-24

## 2022-10-25 ENCOUNTER — TRANSCRIPTION ENCOUNTER (OUTPATIENT)
Age: 72
End: 2022-10-25

## 2022-10-27 ENCOUNTER — APPOINTMENT (OUTPATIENT)
Dept: DISASTER EMERGENCY | Facility: HOSPITAL | Age: 72
End: 2022-10-27

## 2022-10-27 ENCOUNTER — OUTPATIENT (OUTPATIENT)
Dept: OUTPATIENT SERVICES | Facility: HOSPITAL | Age: 72
LOS: 1 days | End: 2022-10-27

## 2022-10-27 DIAGNOSIS — Z98.890 OTHER SPECIFIED POSTPROCEDURAL STATES: Chronic | ICD-10-CM

## 2022-10-27 DIAGNOSIS — Z90.13 ACQUIRED ABSENCE OF BILATERAL BREASTS AND NIPPLES: Chronic | ICD-10-CM

## 2022-10-27 DIAGNOSIS — Z96.7 PRESENCE OF OTHER BONE AND TENDON IMPLANTS: Chronic | ICD-10-CM

## 2022-10-27 DIAGNOSIS — Z90.89 ACQUIRED ABSENCE OF OTHER ORGANS: Chronic | ICD-10-CM

## 2022-10-27 DIAGNOSIS — U07.1 COVID-19: ICD-10-CM

## 2022-11-15 ENCOUNTER — OUTPATIENT (OUTPATIENT)
Dept: OUTPATIENT SERVICES | Facility: HOSPITAL | Age: 72
LOS: 1 days | End: 2022-11-15

## 2022-11-15 ENCOUNTER — APPOINTMENT (OUTPATIENT)
Dept: ULTRASOUND IMAGING | Facility: CLINIC | Age: 72
End: 2022-11-15

## 2022-11-15 DIAGNOSIS — Z98.890 OTHER SPECIFIED POSTPROCEDURAL STATES: Chronic | ICD-10-CM

## 2022-11-15 DIAGNOSIS — Z96.7 PRESENCE OF OTHER BONE AND TENDON IMPLANTS: Chronic | ICD-10-CM

## 2022-11-15 DIAGNOSIS — Z90.13 ACQUIRED ABSENCE OF BILATERAL BREASTS AND NIPPLES: Chronic | ICD-10-CM

## 2022-11-15 DIAGNOSIS — E04.1 NONTOXIC SINGLE THYROID NODULE: ICD-10-CM

## 2022-11-15 DIAGNOSIS — Z90.89 ACQUIRED ABSENCE OF OTHER ORGANS: Chronic | ICD-10-CM

## 2022-11-15 PROCEDURE — 76536 US EXAM OF HEAD AND NECK: CPT | Mod: 26

## 2022-11-21 ENCOUNTER — NON-APPOINTMENT (OUTPATIENT)
Age: 72
End: 2022-11-21

## 2023-01-03 LAB
APPEARANCE: CLEAR
BACTERIA UR CULT: ABNORMAL
BACTERIA: NEGATIVE
BILIRUBIN URINE: NEGATIVE
BLOOD URINE: NORMAL
COLOR: YELLOW
GLUCOSE QUALITATIVE U: NEGATIVE
HYALINE CASTS: 0 /LPF
KETONES URINE: NEGATIVE
LEUKOCYTE ESTERASE URINE: ABNORMAL
MICROSCOPIC-UA: NORMAL
NITRITE URINE: NEGATIVE
PH URINE: 6
PROTEIN URINE: NEGATIVE
RED BLOOD CELLS URINE: 2 /HPF
SPECIFIC GRAVITY URINE: 1.02
SQUAMOUS EPITHELIAL CELLS: 0 /HPF
URINE CYTOLOGY: NORMAL
UROBILINOGEN URINE: NORMAL
WHITE BLOOD CELLS URINE: 127 /HPF

## 2023-01-09 ENCOUNTER — APPOINTMENT (OUTPATIENT)
Dept: ENDOCRINOLOGY | Facility: CLINIC | Age: 73
End: 2023-01-09
Payer: MEDICARE

## 2023-01-09 VITALS — OXYGEN SATURATION: 99 % | HEART RATE: 84 BPM | DIASTOLIC BLOOD PRESSURE: 88 MMHG | SYSTOLIC BLOOD PRESSURE: 124 MMHG

## 2023-01-09 VITALS — WEIGHT: 129 LBS | HEIGHT: 64 IN | BODY MASS INDEX: 22.02 KG/M2

## 2023-01-09 DIAGNOSIS — Z78.9 OTHER SPECIFIED HEALTH STATUS: ICD-10-CM

## 2023-01-09 DIAGNOSIS — Z87.39 PERSONAL HISTORY OF OTHER DISEASES OF THE MUSCULOSKELETAL SYSTEM AND CONNECTIVE TISSUE: ICD-10-CM

## 2023-01-09 DIAGNOSIS — M81.0 AGE-RELATED OSTEOPOROSIS W/OUT CURRENT PATHOLOGICAL FRACTURE: ICD-10-CM

## 2023-01-09 PROCEDURE — 99203 OFFICE O/P NEW LOW 30 MIN: CPT

## 2023-01-09 NOTE — PHYSICAL EXAM
[Alert] : alert [No Acute Distress] : no acute distress [Well Developed] : well developed [Normal Voice/Communication] : normal voice communication [Normal Sclera/Conjunctiva] : normal sclera/conjunctiva [EOMI] : extra ocular movement intact [PERRL] : pupils equal, round and reactive to light [No Proptosis] : no proptosis [No Lid Lag] : no lid lag [Normal Outer Ear/Nose] : the ears and nose were normal in appearance [Normal Hearing] : hearing was normal [No Neck Mass] : no neck mass was observed [No Thyroid Nodules] : no palpable thyroid nodules [No Respiratory Distress] : no respiratory distress [Clear to Auscultation] : lungs were clear to auscultation bilaterally [Normal Rate] : heart rate was normal [Regular Rhythm] : with a regular rhythm [Not Tender] : non-tender [Soft] : abdomen soft [Normal Supraclavicular Nodes] : no supraclavicular lymphadenopathy [Normal Anterior Cervical Nodes] : no anterior cervical lymphadenopathy [Normal Posterior Cervical Nodes] : no posterior cervical lymphadenopathy [Normal Reflexes] : deep tendon reflexes were 2+ and symmetric [No Tremors] : no tremors [Oriented x3] : oriented to person, place, and time [Normal Affect] : the affect was normal [Normal Insight/Judgement] : insight and judgment were intact [Normal Mood] : the mood was normal [de-identified] : Normal left thyroid lobe. No thyroid tissue palpated in left thyroid bed

## 2023-01-09 NOTE — HISTORY OF PRESENT ILLNESS
[FreeTextEntry1] : 72 y.o Female with PMHx of Hodgkins Ly, Neuropathy, Breast Ca, S/p b/l mastectomy, Left thyroidectomy, referred from Hem/Onc for evaluation of right thyroid nodule initially seen on chest CT. Thyroid US shows multiple subcentimeter predominantly cystic nodules. Patient underwent left hemithyroidectomy in 2007 at [facility] for suspicious thyroid nodules while she was diagnosed with breast cancer as well. Surgical pathology was reported as benign. Patient denies symptoms of hypothyroidism. Denies compressive symptoms, neck pain, sore throat or dysphagia. Patient has not been on any thyroid medications and her TFTs has been reportedly normal. No Family Hx of thyroid cancer.

## 2023-01-09 NOTE — ASSESSMENT
[FreeTextEntry1] : 72 y.o Female with PMHx of Hodgkins Ly, Neuropathy, Breast Ca, S/p b/l mastectomy, Left thyroidectomy, referred from Hem/Onc for evaluation of right thyroid nodule initially seen on chest CT (10/11/22). Thyroid US (11/15/22) shows multiple subcentimeter predominantly cystic nodules. Patient has Hx of left hemithyroidectomy in 2007 at [facility] for suspicious thyroid nodules while she was diagnosed with breast cancer as well. Surgical pathology was reported as benign. Patient denies symptoms of hypothyroidism. Denies compressive symptoms, neck pain, sore throat or dysphagia. Patient has not been on any thyroid medications and her TFTs has been reportedly normal. No Family Hx of thyroid cancer. \par \par # Multiple right sided subcentimeter thyroid nodules with very low suspicion phenotypically.\par S/p left hemithyroidectomy with reportedly benign pathology\par Clinically euthyroid. Last TSH (6/2022) wnl\par In view of Hx of multiple malignancies, we will repeat thyroid US in 6 months.\par Patient agree with the above plan. \par  \par

## 2023-03-13 ENCOUNTER — OUTPATIENT (OUTPATIENT)
Dept: OUTPATIENT SERVICES | Facility: HOSPITAL | Age: 73
LOS: 1 days | End: 2023-03-13

## 2023-03-13 ENCOUNTER — APPOINTMENT (OUTPATIENT)
Dept: MRI IMAGING | Facility: CLINIC | Age: 73
End: 2023-03-13
Payer: MEDICARE

## 2023-03-13 DIAGNOSIS — Z98.890 OTHER SPECIFIED POSTPROCEDURAL STATES: Chronic | ICD-10-CM

## 2023-03-13 DIAGNOSIS — Z96.7 PRESENCE OF OTHER BONE AND TENDON IMPLANTS: Chronic | ICD-10-CM

## 2023-03-13 DIAGNOSIS — Z00.00 ENCOUNTER FOR GENERAL ADULT MEDICAL EXAMINATION WITHOUT ABNORMAL FINDINGS: ICD-10-CM

## 2023-03-13 DIAGNOSIS — Z90.13 ACQUIRED ABSENCE OF BILATERAL BREASTS AND NIPPLES: Chronic | ICD-10-CM

## 2023-03-13 DIAGNOSIS — Z90.89 ACQUIRED ABSENCE OF OTHER ORGANS: Chronic | ICD-10-CM

## 2023-03-13 PROCEDURE — 74181 MRI ABDOMEN W/O CONTRAST: CPT | Mod: 26

## 2023-04-11 NOTE — PATIENT PROFILE ADULT. - CENTRAL VENOUS CATHETER
Patient is a 39y old  Female who presents with a chief complaint of nausea vomiting, constipation (2023 08:54)      INTERVAL HPI/OVERNIGHT EVENTS: Patient seen and examined at bedside. No overnight events. Denies fever, chills, chest pain, shortness of breath, abdominal pain, nausea/vomiting, headache.    MEDICATIONS  (STANDING):  dextrose 5%. 1000 milliLiter(s) (100 mL/Hr) IV Continuous <Continuous>  dextrose 5%. 1000 milliLiter(s) (50 mL/Hr) IV Continuous <Continuous>  dextrose 50% Injectable 25 Gram(s) IV Push once  dextrose 50% Injectable 12.5 Gram(s) IV Push once  dextrose 50% Injectable 25 Gram(s) IV Push once  enoxaparin Injectable 40 milliGRAM(s) SubCutaneous every 24 hours  glucagon  Injectable 1 milliGRAM(s) IntraMuscular once  insulin lispro (ADMELOG) corrective regimen sliding scale   SubCutaneous every 6 hours  lactated ringers. 1000 milliLiter(s) (50 mL/Hr) IV Continuous <Continuous>  levothyroxine 100 MICROGram(s) Oral daily  magnesium hydroxide Suspension 30 milliLiter(s) Oral daily  pantoprazole  Injectable 40 milliGRAM(s) IV Push daily  polyethylene glycol 3350 17 Gram(s) Oral two times a day  senna 2 Tablet(s) Oral at bedtime    MEDICATIONS  (PRN):  dextrose Oral Gel 15 Gram(s) Oral once PRN Blood Glucose LESS THAN 70 milliGRAM(s)/deciliter  ketorolac   Injectable 30 milliGRAM(s) IV Push every 6 hours PRN Moderate Pain (4 - 6)  ondansetron Injectable 4 milliGRAM(s) IV Push every 4 hours PRN Nausea and/or Vomiting      Allergies    adhesives (Rash)  contrast media (iodine-based) (Rash)  frozen green beans (Hives; Flushing; Angioedema)  Peaches (Urticaria; Flushing; Rhinitis)  peanuts (Hives; Angioedema)    Intolerances        REVIEW OF SYSTEMS:  CONSTITUTIONAL: No fever or chills  HEENT:  No headache, no sore throat  RESPIRATORY: No cough, wheezing, or shortness of breath  CARDIOVASCULAR: No chest pain, palpitations  GASTROINTESTINAL: No abd pain, nausea, vomiting, or diarrhea  GENITOURINARY: No dysuria, frequency, or hematuria  NEUROLOGICAL: no focal weakness or dizziness  MUSCULOSKELETAL: no myalgias     Vital Signs Last 24 Hrs  T(C): 36.5 (2023 04:20), Max: 36.7 (10 Apr 2023 13:52)  T(F): 97.7 (2023 04:20), Max: 98.1 (10 Apr 2023 13:52)  HR: 101 (2023 04:20) (93 - 101)  BP: 117/86 (2023 04:20) (112/68 - 117/86)  BP(mean): --  RR: 18 (10 Apr 2023 20:40) (18 - 18)  SpO2: 98% (2023 04:20) (96% - 99%)    Parameters below as of 2023 04:20  Patient On (Oxygen Delivery Method): room air        PHYSICAL EXAM:  GENERAL: NAD  HEENT:  anicteric, moist mucous membranes  CHEST/LUNG:  CTA b/l, no rales, wheezes, or rhonchi  HEART:  RRR, S1, S2  ABDOMEN:  BS+, soft, nontender, nondistended  EXTREMITIES: no edema, cyanosis, or calf tenderness  NERVOUS SYSTEM: answers questions and follows commands appropriately    LABS:                        11.4   5.04  )-----------( 265      ( 2023 06:20 )             37.2     CBC Full  -  ( 2023 06:20 )  WBC Count : 5.04 K/uL  Hemoglobin : 11.4 g/dL  Hematocrit : 37.2 %  Platelet Count - Automated : 265 K/uL  Mean Cell Volume : 81.0 fl  Mean Cell Hemoglobin : 24.8 pg  Mean Cell Hemoglobin Concentration : 30.6 gm/dL  Auto Neutrophil # : x  Auto Lymphocyte # : x  Auto Monocyte # : x  Auto Eosinophil # : x  Auto Basophil # : x  Auto Neutrophil % : x  Auto Lymphocyte % : x  Auto Monocyte % : x  Auto Eosinophil % : x  Auto Basophil % : x    2023 06:20    138    |  105    |  15     ----------------------------<  141    3.6     |  29     |  0.55     Ca    8.5        2023 06:20        Urinalysis Basic - ( 10 Apr 2023 12:00 )    Color: Yellow / Appearance: Clear / S.005 / pH: x  Gluc: x / Ketone: Negative  / Bili: Negative / Urobili: Negative   Blood: x / Protein: Negative / Nitrite: Negative   Leuk Esterase: Negative / RBC: 0-2 /HPF / WBC 0-2   Sq Epi: x / Non Sq Epi: x / Bacteria: Occasional      CAPILLARY BLOOD GLUCOSE      POCT Blood Glucose.: 120 mg/dL (2023 07:59)  POCT Blood Glucose.: 129 mg/dL (2023 00:12)  POCT Blood Glucose.: 112 mg/dL (10 Apr 2023 17:50)          RADIOLOGY & ADDITIONAL TESTS:    Personally reviewed.     Consultant(s) Notes Reviewed:  [x] YES  [ ] NO     no

## 2023-04-12 ENCOUNTER — RESULT REVIEW (OUTPATIENT)
Age: 73
End: 2023-04-12

## 2023-04-19 ENCOUNTER — APPOINTMENT (OUTPATIENT)
Dept: CT IMAGING | Facility: CLINIC | Age: 73
End: 2023-04-19
Payer: MEDICARE

## 2023-04-19 ENCOUNTER — OUTPATIENT (OUTPATIENT)
Dept: OUTPATIENT SERVICES | Facility: HOSPITAL | Age: 73
LOS: 1 days | End: 2023-04-19

## 2023-04-19 DIAGNOSIS — Z98.890 OTHER SPECIFIED POSTPROCEDURAL STATES: Chronic | ICD-10-CM

## 2023-04-19 DIAGNOSIS — Z90.13 ACQUIRED ABSENCE OF BILATERAL BREASTS AND NIPPLES: Chronic | ICD-10-CM

## 2023-04-19 DIAGNOSIS — Z90.89 ACQUIRED ABSENCE OF OTHER ORGANS: Chronic | ICD-10-CM

## 2023-04-19 DIAGNOSIS — Z96.7 PRESENCE OF OTHER BONE AND TENDON IMPLANTS: Chronic | ICD-10-CM

## 2023-04-19 DIAGNOSIS — C81.90 HODGKIN LYMPHOMA, UNSPECIFIED, UNSPECIFIED SITE: ICD-10-CM

## 2023-04-19 PROCEDURE — 74177 CT ABD & PELVIS W/CONTRAST: CPT | Mod: 26

## 2023-04-19 PROCEDURE — 70491 CT SOFT TISSUE NECK W/DYE: CPT | Mod: 26

## 2023-04-19 PROCEDURE — 71260 CT THORAX DX C+: CPT | Mod: 26

## 2023-04-24 ENCOUNTER — OUTPATIENT (OUTPATIENT)
Dept: OUTPATIENT SERVICES | Facility: HOSPITAL | Age: 73
LOS: 1 days | Discharge: HOME | End: 2023-04-24

## 2023-04-24 DIAGNOSIS — Z98.890 OTHER SPECIFIED POSTPROCEDURAL STATES: Chronic | ICD-10-CM

## 2023-04-24 DIAGNOSIS — Z96.7 PRESENCE OF OTHER BONE AND TENDON IMPLANTS: Chronic | ICD-10-CM

## 2023-04-24 DIAGNOSIS — C81.90 HODGKIN LYMPHOMA, UNSPECIFIED, UNSPECIFIED SITE: ICD-10-CM

## 2023-04-24 DIAGNOSIS — Z90.89 ACQUIRED ABSENCE OF OTHER ORGANS: Chronic | ICD-10-CM

## 2023-04-24 DIAGNOSIS — Z90.13 ACQUIRED ABSENCE OF BILATERAL BREASTS AND NIPPLES: Chronic | ICD-10-CM

## 2023-04-27 ENCOUNTER — APPOINTMENT (OUTPATIENT)
Dept: HEMATOLOGY ONCOLOGY | Facility: CLINIC | Age: 73
End: 2023-04-27
Payer: MEDICARE

## 2023-04-27 VITALS
HEIGHT: 64 IN | DIASTOLIC BLOOD PRESSURE: 85 MMHG | HEART RATE: 74 BPM | BODY MASS INDEX: 22.02 KG/M2 | OXYGEN SATURATION: 99 % | WEIGHT: 129 LBS | SYSTOLIC BLOOD PRESSURE: 131 MMHG | TEMPERATURE: 97.6 F

## 2023-04-27 DIAGNOSIS — E04.1 NONTOXIC SINGLE THYROID NODULE: ICD-10-CM

## 2023-04-27 PROCEDURE — 99214 OFFICE O/P EST MOD 30 MIN: CPT

## 2023-04-27 RX ORDER — LIDOCAINE AND PRILOCAINE 25; 25 MG/G; MG/G
2.5-2.5 CREAM TOPICAL
Qty: 1 | Refills: 0 | Status: DISCONTINUED | COMMUNITY
Start: 2022-03-21 | End: 2023-04-27

## 2023-04-27 RX ORDER — PROCHLORPERAZINE MALEATE 10 MG/1
10 TABLET ORAL EVERY 6 HOURS
Qty: 30 | Refills: 1 | Status: DISCONTINUED | COMMUNITY
Start: 2022-01-31 | End: 2023-04-27

## 2023-04-27 RX ORDER — TRAZODONE HYDROCHLORIDE 50 MG/1
50 TABLET ORAL
Qty: 30 | Refills: 1 | Status: DISCONTINUED | COMMUNITY
Start: 2022-02-16 | End: 2023-04-27

## 2023-04-27 RX ORDER — ONDANSETRON 8 MG/1
8 TABLET ORAL EVERY 8 HOURS
Qty: 30 | Refills: 0 | Status: DISCONTINUED | COMMUNITY
Start: 2022-01-31 | End: 2023-04-27

## 2023-04-27 RX ORDER — PREGABALIN 50 MG/1
50 CAPSULE ORAL
Qty: 30 | Refills: 1 | Status: DISCONTINUED | COMMUNITY
Start: 2022-07-22 | End: 2023-04-27

## 2023-04-27 NOTE — ADDENDUM
[FreeTextEntry1] : Documented by Grace Kellogg acting as scribe for  on 04/27/2023 \par \par All Medical record entries made by the Scribe were at my, 's , direction and personally dictated by me on 04/27/2023 . I have reviewed the chart and agree that the record accurately reflects my personal performance of the history, physical exam, assessment and plan. I have also personally directed, reviewed, and agreed with the discharge instructions.\par \par

## 2023-04-27 NOTE — HISTORY OF PRESENT ILLNESS
[de-identified] : Ms. Wooten is a 72 y/o patient who is being seen for suspicion of Hogdkin's Lymphoma.  \par \par Her medical history is significant for a remote history of stage I ER+ right breast cancer in 1998, s/p lumpectomy, followed by CMF x 6 months and adjuvant RT. She took raloxifene for 5 years. 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 \par She has recurrent UTIs, spinal stenosis, RA.\par \par She is s/p fall in kitchen, 06/26/21. Admitted to hospital, fractured sacrum and fractured L2 (states that L2 fracture might have previously been there).\par 6/30/21 MRI sacrum - Acute H-shaped sacral fracture. Advanced bilateral hip osteoarthritis. Small to moderate-sized bilateral hip joint effusions with synovitis.\par 6/30/21 MRI thoracic spine -Multilevel small thoracic spine disc herniations with cord impingement. No cord signal abnormality.\par Chronic L2 superior anterior corner fracture.\par Probable nondisplaced sacrococcygeal fracture with presacral edema. Dedicated bony pelvis MRI CT is recommended.\par Right paravertebral soft tissue lesion at the level of T12/L1 suggests nerve sheath tumor. Nonemergent postcontrast images are recommended.\par \par She was referred to Dr. Interiano and subsequently had a core needle biopsy of the right paravertebral soft tissue mass at T12/L1 on 9/14/21. \par Pathology - suspicious for Hodgkins' lymphoma, favor nodular lymphocyte predominant. However additional tissue is recommended for further evaluation. \par \par \par PET/CT Skull-Thigh 09/30/21: IMPRESSION: FDG-PET/CT scan:\par 1. FDG avid oval soft tissue along the right thoracic paraspinal region, corresponding to the enhancing oval mass on MRI 8/2/2021.\par 2. Linear FDG avidity in the right sacral ala, corresponding to insufficiency fracture, also noted on MRI.\par 3. Minimal stranding adjacent to the anterior surface of the left breast implant, nonspecific. Please correlate clinically.\par \par T Cell Gene Rearrangement 09/22/21: TCR-beta: Negative, TCR-gamma: Negative\par Interpretation: No discrete bands were identified in either TCR-beta or TCR-gamma, suggesting a polycional T-cell population\par \par Pathology 09/14/21: Final Diagnosis\par 1.  Paraspinal mass, right, core biopsies:\par - Suspicious for Hodgkin Lymphoma.\par  \par MRI Lumbar Spine 08/02/21: IMPRESSION:\par 1. Enhancing oval soft tissue mass is again seen along the inferior margin of the right 12th rib at the costovertebral junction, abutting the pleura, and without T12-L1 foraminal extension. This appears stable and a peripheral nerve sheath tumor may be considered as well as other processes including metastatic disease. Correlation with older imaging is suggested if available to assess stability. Consider PET CT or CT chest abdomen and pelvis for further evaluation or follow-up MRI to assess stability.\par 2. Presumed progression of the sacral insufficiency fractures with diffuse marrow edema and marrow replacement of the right sacral ala. Superimposed metastatic disease could not be absolutely excluded. CT may be helpful for further characterization or follow-up MRI to assess stability and to assess healing.\par \par Currently, taking Percocet at night for pain in the sacrum. Since 2015, has been on Gabapentin due to shingles. Also taking Methenamine for UTI. \par No fever, night sweats.Admits intermittent hot flashes. Difficulty sleeping due to pain in sacrum.\par \par PCP - Romaine Guzman \par \par  [de-identified] : Patient presents for follow-up \par Feels well overall\par Has continued neuropathy, is on gabapentin\par Reports urinary frequency 4-x nightly, planning to f/u with urogyn\par Occasional night sweats, has not worsened\par Denies fevers, LE swelling\par Uses estrogen vaginal cream intermittently.

## 2023-04-27 NOTE — ASSESSMENT
[FreeTextEntry1] : 72 year old female with remote history of R breast cancer in 1998 s/p CMF, and bilateral DCIS s/p b/l mastectomy in 2018, now presenting with Right paravertebral soft tissue lesion at the level of T12/L1 which was discovered on imaging s/p fall in 6/2021.  She is s/p core biopsy by IR on 9/14/21 which shows - suspicious for Hodgkin's lymphoma, favoring nodular lymphocyte predominant, however additional tissue needed for a definitive diagnosis. \par S/p repeat core biopsy on 10/18/21 --> consistent with classic Hodgkin's lymphoma. She has stage I favorable disease. She deferred starting treatment until February 2022. \par From 2/4/22 - 5/13/22 received 4 cycles of AVD (cycle 4, day 15 deferred due to hospitalization for UTI).\par 4/6/22 PET showed complete response. \par 10/11/22 CT n/c/a/p - 6 mm thyroid nodule. No significant lymphadenopathy. Mild intrahepatic biliary dilatation of unclear etiology. CBD 8 mm\par \par Reviewed\par 4/19/23 CT neck: No cervical lymphadenopathy \par 4/19/23 CT CAP- Left lobe the thyroid gland not seen which is stable. 3 mm nonobstructing stone in the upper pole of the left kidney.\par \par \par Plan:\par Schedule CT CAP October 2023\par RTO 6 months after scans\par

## 2023-05-22 NOTE — ED ADULT NURSE NOTE - NS ED NURSE LEVEL OF CONSCIOUSNESS MENTAL STATUS
05/22/2023    MCV 90.1 05/22/2023     05/22/Alert/Awake/Cooperative

## 2023-08-29 LAB
APPEARANCE: ABNORMAL
BACTERIA: ABNORMAL /HPF
BILIRUBIN URINE: NEGATIVE
BLOOD URINE: ABNORMAL
CAST: 0 /LPF
COLOR: YELLOW
EPITHELIAL CELLS: 4 /HPF
GLUCOSE QUALITATIVE U: NEGATIVE MG/DL
KETONES URINE: NEGATIVE MG/DL
LEUKOCYTE ESTERASE URINE: ABNORMAL
MICROSCOPIC-UA: NORMAL
NITRITE URINE: NEGATIVE
PH URINE: 6
PROTEIN URINE: NORMAL MG/DL
RED BLOOD CELLS URINE: 2 /HPF
SPECIFIC GRAVITY URINE: 1.02
UROBILINOGEN URINE: 0.2 MG/DL
WHITE BLOOD CELLS URINE: 94 /HPF

## 2023-09-29 NOTE — ED ADULT NURSE REASSESSMENT NOTE - NEURO GAIT
"  History     Chief Complaint   Patient presents with    Dental Pain     Right sided dental pain     HPI  Navjot Kerr is a 23 year old male who presents to the urgent care with a complaints of pain to tooth #32. He has a long history of dental pain and has had pain to this tooth for \"a long time.\" Pain has been increasing for the last 2-3 days. He was seen 8/30 and given augmentin, which seemed to help. He has been unable to see a dentist. Alternating tylenol and ibuprofen without relief in pain. He denies fevers, chills, n/v/d, difficulty opening mouth, and swallowing concerns. Daily smoker.     Allergies:  No Known Allergies    Problem List:    Patient Active Problem List    Diagnosis Date Noted    Intellectual delay 09/06/2023     Priority: Medium    Anxiety 09/06/2023     Priority: Medium    Unspecified mood (affective) disorder (H) 09/06/2023     Priority: Medium    Suicide gesture, initial encounter (H) 03/18/2021     Priority: Medium    Suicidal ideation 10/26/2020     Priority: Medium    Impulsive 10/26/2020     Priority: Medium    Severe episode of recurrent major depressive disorder, without psychotic features (H) 10/26/2020     Priority: Medium    Morbid obesity (H) 09/18/2020     Priority: Medium        Past Medical History:    Past Medical History:   Diagnosis Date    Asthma, unspecified type, with (acute) exacerbatio 03/14/2003    Asthma,unspecified type, unspecified 04/12/2002    HTN (hypertension)        Past Surgical History:    Past Surgical History:   Procedure Laterality Date    asthma         Family History:    Family History   Problem Relation Age of Onset    Alcoholism Mother     Bipolar Disorder Mother     Heart Disease Paternal Grandmother     Unknown/Adopted Maternal Grandmother     Unknown/Adopted Maternal Grandfather        Social History:  Marital Status:  Single [1]  Social History     Tobacco Use    Smoking status: Every Day     Packs/day: 3.00     Years: 2.00     Pack years: 6.00 "
"    Types: Cigarettes, Vaping Device    Smokeless tobacco: Never   Substance Use Topics    Alcohol use: Yes     Comment: \"haven't been able to get any in awhile but if I did, I would drink as much as I could.\"    Drug use: No        Medications:    acetaminophen (TYLENOL) 500 MG tablet  albuterol (PROAIR HFA/PROVENTIL HFA/VENTOLIN HFA) 108 (90 Base) MCG/ACT inhaler  amoxicillin-clavulanate (AUGMENTIN) 875-125 MG tablet  chlorhexidine (PERIDEX) 0.12 % solution  fluticasone (FLONASE) 50 MCG/ACT nasal spray  ibuprofen (ADVIL/MOTRIN) 600 MG tablet  ibuprofen (ADVIL/MOTRIN) 600 MG tablet  Lidocaine (LIDOCARE) 4 % Patch          Review of Systems   Constitutional:  Positive for appetite change. Negative for chills and fever.   HENT:  Positive for dental problem and ear pain (right sided). Negative for congestion, drooling, rhinorrhea and trouble swallowing.    Respiratory:  Positive for cough.    Gastrointestinal:  Negative for diarrhea, nausea and vomiting.   All other systems reviewed and are negative.      Physical Exam   BP: 126/80  Pulse: 89  Temp: 98.6  F (37  C)  Resp: 18  Height: 195.6 cm (6' 5\")  Weight: 130.6 kg (288 lb)      Physical Exam  Vitals and nursing note reviewed.   Constitutional:       General: He is in acute distress.      Appearance: Normal appearance. He is not ill-appearing, toxic-appearing or diaphoretic.   HENT:      Head:      Jaw: No trismus, tenderness, swelling or pain on movement.      Right Ear: Tympanic membrane, ear canal and external ear normal. There is no impacted cerumen.      Left Ear: Tympanic membrane normal.      Mouth/Throat:      Dentition: Dental tenderness and dental caries present. No dental abscesses or gum lesions.      Tongue: No lesions. Tongue does not deviate from midline.      Pharynx: Oropharynx is clear. Uvula midline. No pharyngeal swelling, oropharyngeal exudate, posterior oropharyngeal erythema or uvula swelling.     Cardiovascular:      Rate and Rhythm: Normal "
"rate and regular rhythm.      Pulses: Normal pulses.      Heart sounds: Normal heart sounds.   Pulmonary:      Effort: Pulmonary effort is normal. No respiratory distress.      Breath sounds: Normal breath sounds. No stridor. No wheezing or rhonchi.   Skin:     General: Skin is warm and dry.      Capillary Refill: Capillary refill takes less than 2 seconds.   Neurological:      General: No focal deficit present.      Mental Status: He is alert and oriented to person, place, and time.   Psychiatric:         Mood and Affect: Mood normal.         Behavior: Behavior normal.         Thought Content: Thought content normal.         Judgment: Judgment normal.         ED Course                 Procedures                  No results found for this or any previous visit (from the past 24 hour(s)).    Medications   ketorolac (TORADOL) injection 30 mg (has no administration in time range)       Assessments & Plan (with Medical Decision Making)     I have reviewed the nursing notes.    I have reviewed the findings, diagnosis, plan and need for follow up with the patient.  Navjot Kerr is a 23 year old male who presents to the urgent care with a complaints of pain to tooth #32. He has a long history of dental pain and has had pain to this tooth for \"a long time.\" Pain has been increasing for the last 2-3 days. He was seen 8/30 and given augmentin, which seemed to help. He has been unable to see a dentist. Alternating tylenol and ibuprofen without relief in pain. He denies fevers, chills, n/v/d, difficulty opening mouth, and swallowing concerns. Daily smoker.     MDM: VSS and afebrile. Lungs clear, heart tones regular. TM clear bilaterally. There is tenderness with slight swelling surrounding tooth #32. Multiple cavities throughout teeth with poor dentition. There is no trismus or swallowing difficulty. No drooling. Able to manage secretions. He is non toxic in appearance. Offered dental block but he declines. Toradol injection "
given. Will prescribe ibuprofen, augmentin, and peridex swish and spit. Declined dental list as he has a dentist in [place]. Return precautions discussed. He is in agreement with plan.     (K08.89) Pain, dental  Plan: Antibiotics twice daily for 7 days. Yogurt or probiotic while taking. Push fluids. Peridex swish and spit 2 times daily.     You can take 650-1000mg of tylenol every 6 hours, max of 4000mg in 24 hours and 600-800mg of ibuprofen every 8 hours, max of 2400mg in 24 hours. Do not take ibuprofen until after 6PM since we gave you the toradol injection in the urgent care today.     Return with any difficulty opening mouth, swallowing difficulty, uncontrolled fevers, increased pain and swelling, or other concerns.     Follow up with dentist as soon as you are able. Understanding verbalized.           New Prescriptions    AMOXICILLIN-CLAVULANATE (AUGMENTIN) 875-125 MG TABLET    Take 1 tablet by mouth 2 times daily for 7 days    CHLORHEXIDINE (PERIDEX) 0.12 % SOLUTION    Swish and spit 15 mLs in mouth 2 times daily for 7 days    IBUPROFEN (ADVIL/MOTRIN) 600 MG TABLET    Take 1 tablet (600 mg) by mouth every 6 hours as needed for moderate pain       Final diagnoses:   Pain, dental       9/29/2023   [facility]       Aaliyah Harding NP  09/29/23 [id number]    
steady

## 2023-10-11 ENCOUNTER — RESULT REVIEW (OUTPATIENT)
Age: 73
End: 2023-10-11

## 2023-10-30 ENCOUNTER — APPOINTMENT (OUTPATIENT)
Dept: ULTRASOUND IMAGING | Facility: CLINIC | Age: 73
End: 2023-10-30
Payer: MEDICARE

## 2023-10-30 ENCOUNTER — OUTPATIENT (OUTPATIENT)
Dept: OUTPATIENT SERVICES | Facility: HOSPITAL | Age: 73
LOS: 1 days | End: 2023-10-30

## 2023-10-30 DIAGNOSIS — Z98.890 OTHER SPECIFIED POSTPROCEDURAL STATES: Chronic | ICD-10-CM

## 2023-10-30 DIAGNOSIS — Z90.13 ACQUIRED ABSENCE OF BILATERAL BREASTS AND NIPPLES: Chronic | ICD-10-CM

## 2023-10-30 DIAGNOSIS — Z90.89 ACQUIRED ABSENCE OF OTHER ORGANS: Chronic | ICD-10-CM

## 2023-10-30 DIAGNOSIS — Z96.7 PRESENCE OF OTHER BONE AND TENDON IMPLANTS: Chronic | ICD-10-CM

## 2023-10-30 DIAGNOSIS — E04.2 NONTOXIC MULTINODULAR GOITER: ICD-10-CM

## 2023-10-30 PROCEDURE — 76536 US EXAM OF HEAD AND NECK: CPT | Mod: 26

## 2023-11-05 LAB
T4 FREE SERPL-MCNC: 1.3 NG/DL
TSH SERPL-ACNC: 1.12 UIU/ML

## 2023-11-07 ENCOUNTER — OUTPATIENT (OUTPATIENT)
Dept: OUTPATIENT SERVICES | Facility: HOSPITAL | Age: 73
LOS: 1 days | End: 2023-11-07

## 2023-11-07 ENCOUNTER — APPOINTMENT (OUTPATIENT)
Dept: CT IMAGING | Facility: CLINIC | Age: 73
End: 2023-11-07
Payer: MEDICARE

## 2023-11-07 DIAGNOSIS — Z90.89 ACQUIRED ABSENCE OF OTHER ORGANS: Chronic | ICD-10-CM

## 2023-11-07 DIAGNOSIS — C81.90 HODGKIN LYMPHOMA, UNSPECIFIED, UNSPECIFIED SITE: ICD-10-CM

## 2023-11-07 DIAGNOSIS — Z98.890 OTHER SPECIFIED POSTPROCEDURAL STATES: Chronic | ICD-10-CM

## 2023-11-07 DIAGNOSIS — Z96.7 PRESENCE OF OTHER BONE AND TENDON IMPLANTS: Chronic | ICD-10-CM

## 2023-11-07 DIAGNOSIS — Z90.13 ACQUIRED ABSENCE OF BILATERAL BREASTS AND NIPPLES: Chronic | ICD-10-CM

## 2023-11-07 PROCEDURE — 70491 CT SOFT TISSUE NECK W/DYE: CPT | Mod: 26

## 2023-11-07 PROCEDURE — 71260 CT THORAX DX C+: CPT | Mod: 26

## 2023-11-07 PROCEDURE — 74177 CT ABD & PELVIS W/CONTRAST: CPT | Mod: 26

## 2023-11-08 ENCOUNTER — OUTPATIENT (OUTPATIENT)
Dept: OUTPATIENT SERVICES | Facility: HOSPITAL | Age: 73
LOS: 1 days | Discharge: HOME | End: 2023-11-08

## 2023-11-08 DIAGNOSIS — Z90.89 ACQUIRED ABSENCE OF OTHER ORGANS: Chronic | ICD-10-CM

## 2023-11-08 DIAGNOSIS — Z98.890 OTHER SPECIFIED POSTPROCEDURAL STATES: Chronic | ICD-10-CM

## 2023-11-08 DIAGNOSIS — Z90.13 ACQUIRED ABSENCE OF BILATERAL BREASTS AND NIPPLES: Chronic | ICD-10-CM

## 2023-11-08 DIAGNOSIS — C81.90 HODGKIN LYMPHOMA, UNSPECIFIED, UNSPECIFIED SITE: ICD-10-CM

## 2023-11-08 DIAGNOSIS — Z96.7 PRESENCE OF OTHER BONE AND TENDON IMPLANTS: Chronic | ICD-10-CM

## 2023-11-10 ENCOUNTER — APPOINTMENT (OUTPATIENT)
Dept: ENDOCRINOLOGY | Facility: CLINIC | Age: 73
End: 2023-11-10
Payer: MEDICARE

## 2023-11-10 VITALS
SYSTOLIC BLOOD PRESSURE: 140 MMHG | OXYGEN SATURATION: 97 % | DIASTOLIC BLOOD PRESSURE: 80 MMHG | BODY MASS INDEX: 22.2 KG/M2 | HEIGHT: 64 IN | HEART RATE: 81 BPM | WEIGHT: 130 LBS

## 2023-11-10 DIAGNOSIS — E04.2 NONTOXIC MULTINODULAR GOITER: ICD-10-CM

## 2023-11-10 PROCEDURE — 99214 OFFICE O/P EST MOD 30 MIN: CPT

## 2023-11-13 ENCOUNTER — RESULT REVIEW (OUTPATIENT)
Age: 73
End: 2023-11-13

## 2023-11-13 ENCOUNTER — APPOINTMENT (OUTPATIENT)
Dept: HEMATOLOGY ONCOLOGY | Facility: CLINIC | Age: 73
End: 2023-11-13
Payer: MEDICARE

## 2023-11-13 VITALS
HEIGHT: 64 IN | DIASTOLIC BLOOD PRESSURE: 80 MMHG | OXYGEN SATURATION: 99 % | HEART RATE: 70 BPM | BODY MASS INDEX: 22.38 KG/M2 | WEIGHT: 131.06 LBS | TEMPERATURE: 98.5 F | SYSTOLIC BLOOD PRESSURE: 163 MMHG

## 2023-11-13 LAB
BASOPHILS # BLD AUTO: 0.1 K/UL — SIGNIFICANT CHANGE UP (ref 0–0.2)
BASOPHILS # BLD AUTO: 0.1 K/UL — SIGNIFICANT CHANGE UP (ref 0–0.2)
BASOPHILS NFR BLD AUTO: 0.9 % — SIGNIFICANT CHANGE UP (ref 0–2)
BASOPHILS NFR BLD AUTO: 0.9 % — SIGNIFICANT CHANGE UP (ref 0–2)
EOSINOPHIL # BLD AUTO: 0.1 K/UL — SIGNIFICANT CHANGE UP (ref 0–0.5)
EOSINOPHIL # BLD AUTO: 0.1 K/UL — SIGNIFICANT CHANGE UP (ref 0–0.5)
EOSINOPHIL NFR BLD AUTO: 1.7 % — SIGNIFICANT CHANGE UP (ref 0–6)
EOSINOPHIL NFR BLD AUTO: 1.7 % — SIGNIFICANT CHANGE UP (ref 0–6)
HCT VFR BLD CALC: 42.7 % — SIGNIFICANT CHANGE UP (ref 34.5–45)
HCT VFR BLD CALC: 42.7 % — SIGNIFICANT CHANGE UP (ref 34.5–45)
HGB BLD-MCNC: 13.8 G/DL — SIGNIFICANT CHANGE UP (ref 11.5–15.5)
HGB BLD-MCNC: 13.8 G/DL — SIGNIFICANT CHANGE UP (ref 11.5–15.5)
LYMPHOCYTES # BLD AUTO: 2.2 K/UL — SIGNIFICANT CHANGE UP (ref 1–3.3)
LYMPHOCYTES # BLD AUTO: 2.2 K/UL — SIGNIFICANT CHANGE UP (ref 1–3.3)
LYMPHOCYTES # BLD AUTO: 32.8 % — SIGNIFICANT CHANGE UP (ref 13–44)
LYMPHOCYTES # BLD AUTO: 32.8 % — SIGNIFICANT CHANGE UP (ref 13–44)
MCHC RBC-ENTMCNC: 29.9 PG — SIGNIFICANT CHANGE UP (ref 27–34)
MCHC RBC-ENTMCNC: 29.9 PG — SIGNIFICANT CHANGE UP (ref 27–34)
MCHC RBC-ENTMCNC: 32.2 G/DL — SIGNIFICANT CHANGE UP (ref 32–36)
MCHC RBC-ENTMCNC: 32.2 G/DL — SIGNIFICANT CHANGE UP (ref 32–36)
MCV RBC AUTO: 92.9 FL — SIGNIFICANT CHANGE UP (ref 80–100)
MCV RBC AUTO: 92.9 FL — SIGNIFICANT CHANGE UP (ref 80–100)
MONOCYTES # BLD AUTO: 0.6 K/UL — SIGNIFICANT CHANGE UP (ref 0–0.9)
MONOCYTES # BLD AUTO: 0.6 K/UL — SIGNIFICANT CHANGE UP (ref 0–0.9)
MONOCYTES NFR BLD AUTO: 8.3 % — SIGNIFICANT CHANGE UP (ref 2–14)
MONOCYTES NFR BLD AUTO: 8.3 % — SIGNIFICANT CHANGE UP (ref 2–14)
NEUTROPHILS # BLD AUTO: 3.9 K/UL — SIGNIFICANT CHANGE UP (ref 1.8–7.4)
NEUTROPHILS # BLD AUTO: 3.9 K/UL — SIGNIFICANT CHANGE UP (ref 1.8–7.4)
NEUTROPHILS NFR BLD AUTO: 56.4 % — SIGNIFICANT CHANGE UP (ref 43–77)
NEUTROPHILS NFR BLD AUTO: 56.4 % — SIGNIFICANT CHANGE UP (ref 43–77)
PLATELET # BLD AUTO: 221 K/UL — SIGNIFICANT CHANGE UP (ref 150–400)
PLATELET # BLD AUTO: 221 K/UL — SIGNIFICANT CHANGE UP (ref 150–400)
RBC # BLD: 4.6 M/UL — SIGNIFICANT CHANGE UP (ref 3.8–5.2)
RBC # BLD: 4.6 M/UL — SIGNIFICANT CHANGE UP (ref 3.8–5.2)
RBC # FLD: 13.3 % — SIGNIFICANT CHANGE UP (ref 10.3–14.5)
RBC # FLD: 13.3 % — SIGNIFICANT CHANGE UP (ref 10.3–14.5)
WBC # BLD: 6.8 K/UL — SIGNIFICANT CHANGE UP (ref 3.8–10.5)
WBC # BLD: 6.8 K/UL — SIGNIFICANT CHANGE UP (ref 3.8–10.5)
WBC # FLD AUTO: 6.8 K/UL — SIGNIFICANT CHANGE UP (ref 3.8–10.5)
WBC # FLD AUTO: 6.8 K/UL — SIGNIFICANT CHANGE UP (ref 3.8–10.5)

## 2023-11-13 PROCEDURE — 99214 OFFICE O/P EST MOD 30 MIN: CPT

## 2023-11-13 RX ORDER — MIRABEGRON 25 MG/1
25 TABLET, FILM COATED, EXTENDED RELEASE ORAL
Qty: 30 | Refills: 1 | Status: DISCONTINUED | COMMUNITY
Start: 2019-09-10 | End: 2023-11-13

## 2023-11-13 RX ORDER — SULFAMETHOXAZOLE AND TRIMETHOPRIM 800; 160 MG/1; MG/1
800-160 TABLET ORAL TWICE DAILY
Qty: 10 | Refills: 0 | Status: DISCONTINUED | COMMUNITY
Start: 2023-08-31 | End: 2023-11-13

## 2023-11-13 RX ORDER — VIBEGRON 75 MG/1
75 TABLET, FILM COATED ORAL
Qty: 30 | Refills: 2 | Status: DISCONTINUED | COMMUNITY
Start: 2022-07-25 | End: 2023-11-13

## 2023-11-13 RX ORDER — NITROFURANTOIN (MONOHYDRATE/MACROCRYSTALS) 25; 75 MG/1; MG/1
100 CAPSULE ORAL
Qty: 14 | Refills: 0 | Status: DISCONTINUED | COMMUNITY
Start: 2023-08-28 | End: 2023-11-13

## 2023-11-13 RX ORDER — GABAPENTIN 100 MG/1
100 CAPSULE ORAL
Qty: 90 | Refills: 0 | Status: DISCONTINUED | COMMUNITY
Start: 2019-10-14 | End: 2023-11-13

## 2023-11-13 RX ORDER — GABAPENTIN 300 MG/1
300 CAPSULE ORAL 3 TIMES DAILY
Qty: 90 | Refills: 2 | Status: DISCONTINUED | COMMUNITY
Start: 2022-02-18 | End: 2023-11-13

## 2023-11-14 LAB
ALBUMIN SERPL ELPH-MCNC: 4.7 G/DL
ALP BLD-CCNC: 62 U/L
ALT SERPL-CCNC: 15 U/L
ANION GAP SERPL CALC-SCNC: 15 MMOL/L
AST SERPL-CCNC: 17 U/L
BILIRUB SERPL-MCNC: 0.4 MG/DL
BUN SERPL-MCNC: 25 MG/DL
CALCIUM SERPL-MCNC: 9.4 MG/DL
CHLORIDE SERPL-SCNC: 103 MMOL/L
CO2 SERPL-SCNC: 22 MMOL/L
CREAT SERPL-MCNC: 0.51 MG/DL
EGFR: 99 ML/MIN/1.73M2
GLUCOSE SERPL-MCNC: 92 MG/DL
LDH SERPL-CCNC: 151 U/L
POTASSIUM SERPL-SCNC: 4.5 MMOL/L
PROT SERPL-MCNC: 7.8 G/DL
SODIUM SERPL-SCNC: 140 MMOL/L

## 2023-12-08 NOTE — ED PROVIDER NOTE - DOMESTIC TRAVEL HIGH RISK QUESTION
Thank you for choosing us for your care. Given your symptoms, I would like you to do a lab-only visit to determine what is causing them.  I have placed the orders.  Please schedule an appointment with the lab right here in [facility], or call [phone].  I will let you know when the results are back and next steps to take.   No

## 2023-12-19 ENCOUNTER — OUTPATIENT (OUTPATIENT)
Dept: OUTPATIENT SERVICES | Facility: HOSPITAL | Age: 73
LOS: 1 days | End: 2023-12-19
Payer: MEDICARE

## 2023-12-19 ENCOUNTER — RESULT REVIEW (OUTPATIENT)
Age: 73
End: 2023-12-19

## 2023-12-19 ENCOUNTER — APPOINTMENT (OUTPATIENT)
Dept: MRI IMAGING | Facility: CLINIC | Age: 73
End: 2023-12-19

## 2023-12-19 DIAGNOSIS — Z98.890 OTHER SPECIFIED POSTPROCEDURAL STATES: Chronic | ICD-10-CM

## 2023-12-19 DIAGNOSIS — Z90.89 ACQUIRED ABSENCE OF OTHER ORGANS: Chronic | ICD-10-CM

## 2023-12-19 DIAGNOSIS — Z96.7 PRESENCE OF OTHER BONE AND TENDON IMPLANTS: Chronic | ICD-10-CM

## 2023-12-19 DIAGNOSIS — Z90.13 ACQUIRED ABSENCE OF BILATERAL BREASTS AND NIPPLES: Chronic | ICD-10-CM

## 2023-12-19 DIAGNOSIS — D05.12 INTRADUCTAL CARCINOMA IN SITU OF LEFT BREAST: ICD-10-CM

## 2023-12-19 PROCEDURE — A9585: CPT

## 2023-12-19 PROCEDURE — C8908: CPT

## 2023-12-19 PROCEDURE — 77049 MRI BREAST C-+ W/CAD BI: CPT | Mod: 26

## 2023-12-19 PROCEDURE — C8937: CPT

## 2024-02-06 ENCOUNTER — APPOINTMENT (OUTPATIENT)
Dept: BREAST CENTER | Facility: CLINIC | Age: 74
End: 2024-02-06
Payer: COMMERCIAL

## 2024-02-06 VITALS
SYSTOLIC BLOOD PRESSURE: 166 MMHG | BODY MASS INDEX: 21.34 KG/M2 | HEIGHT: 64 IN | WEIGHT: 125 LBS | DIASTOLIC BLOOD PRESSURE: 89 MMHG | HEART RATE: 77 BPM

## 2024-02-06 DIAGNOSIS — D05.10 INTRADUCTAL CARCINOMA IN SITU OF UNSPECIFIED BREAST: ICD-10-CM

## 2024-02-06 DIAGNOSIS — Z85.3 PERSONAL HISTORY OF MALIGNANT NEOPLASM OF BREAST: ICD-10-CM

## 2024-02-06 DIAGNOSIS — D05.12 INTRADUCTAL CARCINOMA IN SITU OF LEFT BREAST: ICD-10-CM

## 2024-02-06 DIAGNOSIS — Z90.13 ACQUIRED ABSENCE OF BILATERAL BREASTS AND NIPPLES: ICD-10-CM

## 2024-02-06 PROCEDURE — 99213 OFFICE O/P EST LOW 20 MIN: CPT

## 2024-02-06 RX ORDER — ALPRAZOLAM 0.25 MG/1
0.25 TABLET ORAL 3 TIMES DAILY
Qty: 20 | Refills: 0 | Status: DISCONTINUED | COMMUNITY
Start: 2018-12-12 | End: 2024-02-06

## 2024-02-06 NOTE — DATA REVIEWED
[FreeTextEntry1] : Bilateral breast MRI 12/10/2023: No evidence of malignancy. Unchanged left internal mammary lymph node. Edema and enhancement in right lower rib deep to implant suspicious for fracture. Correlate clinically.  (Patient heard a "pop" during isometric exercises.)

## 2024-02-06 NOTE — PHYSICAL EXAM
[EOMI] : extra ocular movement intact [Sclera nonicteric] : sclera nonicteric [Supple] : supple [No Supraclavicular Adenopathy] : no supraclavicular adenopathy [No Cervical Adenopathy] : no cervical adenopathy [No Thyromegaly] : no thyromegaly [Clear to Auscultation Bilat] : clear to auscultation bilaterally [Asymmetrical] : asymmetrical [No dominant masses] : no dominant masses in right breast  [No dominant masses] : no dominant masses left breast [No Nipple Retraction] : no left nipple retraction [No Nipple Discharge] : no left nipple discharge [No Axillary Lymphadenopathy] : no left axillary lymphadenopathy [Soft] : abdomen soft [Not Tender] : non-tender [de-identified] : Right smaller than left. [de-identified] : Implant. Inframammary scar.   Older vertical scar 6:00. [de-identified] : Implant. Inframammary scar.

## 2024-02-06 NOTE — HISTORY OF PRESENT ILLNESS
Patient called back stated that he already has an appt on 04/25/23 and would like to know if he can just do the Injection follow up on that same  Instead of scheduling for another one. Please call and advise.   [FreeTextEntry1] : This is a 73 year old  female who has a history of stage I right breast cancer (1 cm mod diff, ER+HI-) in 1998 (age 48) treated with a lumpectomy, sentinel node biopsy, CMF x6 months and radiation.  She also took Evista for 5 years.  She was found to have an abnormality on her left mammogram in June 2018 and had an ultrasound guided core biopsy that showed in situ cancer.  She then had a breast MRI.  There were additional findings in each breast for which MRI guided core biopsies were performed.  Both the right and left MRI guided biopsies showed DCIS.  She underwent a bilateral nipple sparing mastectomy with sentinel node biopsies and direct to prepectoral implant reconstruction on 9/11/2018. She had bilateral DCIS with close margins (Left anterior superior 1mm and right anterior superior <1 mm). She underwent re-excisions of the mastectomy margins with implant exchange on 4/25/2019.  The was no residual DCIS.    She was diagnosed with Hodgkin's lymphoma.  She had fallen and had a sacral fracture.  There was an incidental spine lesion that underwent biopsy showing Hodgkin's.  She was treated with AVDx4.   She has no current complaints.

## 2024-04-05 ENCOUNTER — RESULT REVIEW (OUTPATIENT)
Age: 74
End: 2024-04-05

## 2024-04-12 ENCOUNTER — APPOINTMENT (OUTPATIENT)
Dept: UROGYNECOLOGY | Facility: CLINIC | Age: 74
End: 2024-04-12
Payer: MEDICARE

## 2024-04-12 DIAGNOSIS — R35.1 NOCTURIA: ICD-10-CM

## 2024-04-12 DIAGNOSIS — R35.0 FREQUENCY OF MICTURITION: ICD-10-CM

## 2024-04-12 DIAGNOSIS — N39.41 URGE INCONTINENCE: ICD-10-CM

## 2024-04-12 LAB
BILIRUB UR QL STRIP: NORMAL
CLARITY UR: CLEAR
COLLECTION METHOD: NORMAL
GLUCOSE UR-MCNC: NEGATIVE
HCG UR QL: 1 EU/DL
HGB UR QL STRIP.AUTO: NEGATIVE
KETONES UR-MCNC: NORMAL
LEUKOCYTE ESTERASE UR QL STRIP: NORMAL
NITRITE UR QL STRIP: NEGATIVE
PH UR STRIP: 5.5
PROT UR STRIP-MCNC: 30
SP GR UR STRIP: 1.02

## 2024-04-12 PROCEDURE — A4562: CPT

## 2024-04-12 PROCEDURE — 57160 INSERT PESSARY/OTHER DEVICE: CPT | Mod: 59

## 2024-04-12 PROCEDURE — 99214 OFFICE O/P EST MOD 30 MIN: CPT | Mod: 25

## 2024-04-12 PROCEDURE — 81003 URINALYSIS AUTO W/O SCOPE: CPT | Mod: QW

## 2024-04-12 RX ORDER — ESTRADIOL 0.1 MG/G
0.1 CREAM VAGINAL
Qty: 1 | Refills: 3 | Status: ACTIVE | COMMUNITY
Start: 2020-12-11 | End: 1900-01-01

## 2024-04-15 PROBLEM — R35.0 URINARY FREQUENCY: Status: ACTIVE | Noted: 2017-02-27

## 2024-04-15 PROBLEM — N39.41 URGE INCONTINENCE: Status: ACTIVE | Noted: 2017-02-27

## 2024-04-16 NOTE — PROCEDURE
[Straight Catheterization] : insertion of a straight catheter [Urinary Tract Infection] : a urinary tract infection [Patient] : the patient [___ Fr Straight Tip] : a [unfilled] in [place] straight tip catheter [Cloudy] : cloudy [Culture] : culture [Urinalysis] : urinalysis [No Complications] : no complications [Tolerated Well] : the patient tolerated the procedure well [Post procedure instructions and information given] : Post procedure instructions and information were given and reviewed with patient. [0] : 0 [Good Fit] : fits well [Refit] : refit needed [Erythema] : erythema noted [Discharge] : there is vaginal discharge [Pessary Inserted] : inserted [H2O] : H2O [None] : no bleeding [Erosion] : no evidence of erosion [Infection] : no evidence of infection [FreeTextEntry1] : RS#3 [de-identified] : mild irritation noted to R posterior wall; no active bleeding noted [de-identified] : RS with knob #3-->RS#3

## 2024-04-16 NOTE — PHYSICAL EXAM
[No Acute Distress] : in no acute distress [Well developed] : well developed [Well Nourished] : ~L well nourished [Good Hygeine] : demonstrates good hygeine [Oriented x3] : oriented to person, place, and time [Normal Memory] : ~T memory was ~L unimpaired [Normal Mood/Affect] : mood and affect are normal [Warm and Dry] : was warm and dry to touch [Normal Gait] : gait was normal [Vulvar Atrophy] : vulvar atrophy [Normal] : was normal [Normal Appearance] : general appearance was normal [Atrophy] : atrophy [Discharge] : a  ~M vaginal discharge was present [Scant] : scant [Adolph] : yellow [No Bleeding] : there was no active vaginal bleeding [Cough] : no cough [Foul Smelling] : not foul smelling

## 2024-04-16 NOTE — DISCUSSION/SUMMARY
[FreeTextEntry1] : In office dip showed trace leukocytes which can indicate a UTI with her symptoms. Will be sending urine for cath UA and urine culture for further evaluation; will follow up with results and change medication if resistant to Bactrim. Discussed empiric treatment vs waiting for results and pt wants to be treated empirically. Bactrim eRx sent to pt's preferred pharmacy. Medication instructions given. Pt verbalized understanding. Methenamine and vaginal estrogen cream renewed per pt request.    We discussed management options for overactive bladder including observation, behavorial modifications and bladder training, physical therapy and medications including anticholinergics and beta 3 agonists. Risks/benefits of Myrbetriq discussed and we will start that. Gemtesa offered as well, but not covered by pt's insurance. We discussed additional treatment options including sacral neuromodulation, PTNS and intra detrusor Botox. Myrbetriq 25mg samples given and eRx sent to pt's preferred pharmacy. Pt to RTO in 4-6 weeks for PVR check and evaluate medication effectiveness. Pt verbalized understanding.   Pessary maintenance performed today. Pt unable to place feet in stirrups, VE performed with pt in "butterfly position". Mild irritation noted on R posterior wall; no active bleeding noted. Pt reports difficulty with incontinence pessary and denies BOUBACAR. Pt refit with RS#3 (new supply given). Excellent support with Valsalva and patient was comfortable. She will continue to perform weekly self-care.    Follow-up in 6 months or sooner if needed for pessary maintenance. Instructed to call with any questions or concerns and she verbalizes understanding.

## 2024-04-18 LAB
APPEARANCE: CLEAR
BACTERIA: NEGATIVE /HPF
BILIRUBIN URINE: ABNORMAL
BLOOD URINE: NEGATIVE
CALCIUM OXALATE CRYSTALS: PRESENT
CAST: 7 /LPF
COLOR: NORMAL
EPITHELIAL CELLS: 4 /HPF
GLUCOSE QUALITATIVE U: NEGATIVE MG/DL
HYALINE CASTS: PRESENT
KETONES URINE: ABNORMAL MG/DL
LEUKOCYTE ESTERASE URINE: ABNORMAL
MICROSCOPIC-UA: NORMAL
NITRITE URINE: NEGATIVE
PH URINE: 5.5
PROTEIN URINE: 30 MG/DL
RED BLOOD CELLS URINE: 3 /HPF
REVIEW: NORMAL
SPECIFIC GRAVITY URINE: 1.03
UROBILINOGEN URINE: 1 MG/DL
WHITE BLOOD CELLS URINE: 2 /HPF

## 2024-05-04 ENCOUNTER — OUTPATIENT (OUTPATIENT)
Dept: OUTPATIENT SERVICES | Facility: HOSPITAL | Age: 74
LOS: 1 days | End: 2024-05-04
Payer: MEDICARE

## 2024-05-04 ENCOUNTER — APPOINTMENT (OUTPATIENT)
Dept: CT IMAGING | Facility: CLINIC | Age: 74
End: 2024-05-04

## 2024-05-04 DIAGNOSIS — C81.90 HODGKIN LYMPHOMA, UNSPECIFIED, UNSPECIFIED SITE: ICD-10-CM

## 2024-05-04 DIAGNOSIS — Z98.890 OTHER SPECIFIED POSTPROCEDURAL STATES: Chronic | ICD-10-CM

## 2024-05-04 DIAGNOSIS — Z90.89 ACQUIRED ABSENCE OF OTHER ORGANS: Chronic | ICD-10-CM

## 2024-05-04 DIAGNOSIS — Z96.7 PRESENCE OF OTHER BONE AND TENDON IMPLANTS: Chronic | ICD-10-CM

## 2024-05-04 DIAGNOSIS — Z90.13 ACQUIRED ABSENCE OF BILATERAL BREASTS AND NIPPLES: Chronic | ICD-10-CM

## 2024-05-04 PROCEDURE — 70491 CT SOFT TISSUE NECK W/DYE: CPT | Mod: 26

## 2024-05-04 PROCEDURE — 74177 CT ABD & PELVIS W/CONTRAST: CPT | Mod: 26

## 2024-05-04 PROCEDURE — 71260 CT THORAX DX C+: CPT | Mod: 26

## 2024-05-09 ENCOUNTER — APPOINTMENT (OUTPATIENT)
Dept: HEMATOLOGY ONCOLOGY | Facility: CLINIC | Age: 74
End: 2024-05-09
Payer: MEDICARE

## 2024-05-09 ENCOUNTER — RESULT REVIEW (OUTPATIENT)
Age: 74
End: 2024-05-09

## 2024-05-09 VITALS
OXYGEN SATURATION: 96 % | WEIGHT: 125 LBS | HEART RATE: 76 BPM | SYSTOLIC BLOOD PRESSURE: 147 MMHG | BODY MASS INDEX: 21.34 KG/M2 | HEIGHT: 64 IN | DIASTOLIC BLOOD PRESSURE: 87 MMHG

## 2024-05-09 DIAGNOSIS — C81.90 HODGKIN LYMPHOMA, UNSPECIFIED, UNSPECIFIED SITE: ICD-10-CM

## 2024-05-09 PROCEDURE — 99214 OFFICE O/P EST MOD 30 MIN: CPT

## 2024-05-09 NOTE — HISTORY OF PRESENT ILLNESS
[de-identified] : Ms. Wooten is a 70 y/o patient who is being seen for suspicion of Hogdkin's Lymphoma.  \par  \par  Her medical history is significant for a remote history of stage I ER+ right breast cancer in 1998, s/p lumpectomy, followed by CMF x 6 months and adjuvant RT. She took raloxifene for 5 years. 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 \par  She has recurrent UTIs, spinal stenosis, RA.\par  \par  She is s/p fall in kitchen, 06/26/21. Admitted to hospital, fractured sacrum and fractured L2 (states that L2 fracture might have previously been there).\par  6/30/21 MRI sacrum - Acute H-shaped sacral fracture. Advanced bilateral hip osteoarthritis. Small to moderate-sized bilateral hip joint effusions with synovitis.\par  6/30/21 MRI thoracic spine -Multilevel small thoracic spine disc herniations with cord impingement. No cord signal abnormality.\par  Chronic L2 superior anterior corner fracture.\par  Probable nondisplaced sacrococcygeal fracture with presacral edema. Dedicated bony pelvis MRI CT is recommended.\par  Right paravertebral soft tissue lesion at the level of T12/L1 suggests nerve sheath tumor. Nonemergent postcontrast images are recommended.\par  \par  She was referred to Dr. Interiano and subsequently had a core needle biopsy of the right paravertebral soft tissue mass at T12/L1 on 9/14/21. \par  Pathology - suspicious for Hodgkins' lymphoma, favor nodular lymphocyte predominant. However additional tissue is recommended for further evaluation. \par  \par  \par  PET/CT Skull-Thigh 09/30/21: IMPRESSION: FDG-PET/CT scan:\par  1. FDG avid oval soft tissue along the right thoracic paraspinal region, corresponding to the enhancing oval mass on MRI 8/2/2021.\par  2. Linear FDG avidity in the right sacral ala, corresponding to insufficiency fracture, also noted on MRI.\par  3. Minimal stranding adjacent to the anterior surface of the left breast implant, nonspecific. Please correlate clinically.\par  \par  T Cell Gene Rearrangement 09/22/21: TCR-beta: Negative, TCR-gamma: Negative\par  Interpretation: No discrete bands were identified in either TCR-beta or TCR-gamma, suggesting a polycional T-cell population\par  \par  Pathology 09/14/21: Final Diagnosis\par  1.  Paraspinal mass, right, core biopsies:\par  - Suspicious for Hodgkin Lymphoma.\par   \par  MRI Lumbar Spine 08/02/21: IMPRESSION:\par  1. Enhancing oval soft tissue mass is again seen along the inferior margin of the right 12th rib at the costovertebral junction, abutting the pleura, and without T12-L1 foraminal extension. This appears stable and a peripheral nerve sheath tumor may be considered as well as other processes including metastatic disease. Correlation with older imaging is suggested if available to assess stability. Consider PET CT or CT chest abdomen and pelvis for further evaluation or follow-up MRI to assess stability.\par  2. Presumed progression of the sacral insufficiency fractures with diffuse marrow edema and marrow replacement of the right sacral ala. Superimposed metastatic disease could not be absolutely excluded. CT may be helpful for further characterization or follow-up MRI to assess stability and to assess healing.\par  \par  Currently, taking Percocet at night for pain in the sacrum. Since 2015, has been on Gabapentin due to shingles. Also taking Methenamine for UTI. \par  No fever, night sweats.Admits intermittent hot flashes. Difficulty sleeping due to pain in sacrum.\par  \par  PCP - Romaine Guzman \par  \par   [de-identified] : Patient presents for follow-up today Feels well overall, some fatigue due to caring for 89 yr old  Reports occasional hot flashes Denies fever, chills, drenching night sweats, weight loss, lumps or bumps Treated for UTI recently, states she gets every month attributing to pessary use Denies hospitalization Denies chest pain, sob, abdominal pain, nausea, vomiting, bone pain denies any oral infection or active sinus infection   [0 - No Distress] : Distress Level: 0 [ECOG Performance Status: 0 - Fully active, able to carry on all pre-disease performance without restriction] : Performance Status: 0 - Fully active, able to carry on all pre-disease performance without restriction

## 2024-05-09 NOTE — PHYSICAL EXAM
[Normal] : no peripheral adenopathy appreciated [de-identified] : amanuel. LE varicosity [de-identified] : LE

## 2024-05-09 NOTE — ASSESSMENT
[FreeTextEntry1] : 72 year old female with remote history of R breast cancer in 1998 s/p CMF, and bilateral DCIS s/p b/l mastectomy in 2018, now presenting with Right paravertebral soft tissue lesion at the level of T12/L1 which was discovered on imaging s/p fall in 6/2021. She is s/p core biopsy by IR on 9/14/21 which shows - suspicious for Hodgkin's lymphoma, favoring nodular lymphocyte predominant, however additional tissue needed for a definitive diagnosis.  S/p repeat core biopsy on 10/18/21 --> consistent with classic Hodgkin's lymphoma. She has stage I favorable disease. She deferred starting treatment until February 2022. From 2/4/22 - 5/13/22 received 4 cycles of AVD (cycle 4, day 15 deferred due to hospitalization for UTI).  4/6/22 PET showed complete response.  11/7/23 CT neck -no cervical lymphadenopathy.  11/14/23 CT C/A/P- No lymphadenopathy in the chest, abdomen or pelvis  Reviewed : 5/4/24 CT neck-Compared to 11/7/2023, stable exam without new or enlarging lymph nodes in the neck. No lesions or abnormal enhancement in aerodigestive mucosa. Mild mucosal thickening at the base of the right maxillary sinus can be odontogenic. General dental follow-up is recommended.  5/9/24 CBC from today: WBC 9.0 HGB 13.8 HCT 41.6  ANC 6 No B symptoms or LAD   AS per NCCN guideline H&P every 3-6 mo for 1-2 y, then every 6-12 mo until year 3, then annually.  Diagnostic CT at 3- to 6-month intervals for up to 2 years as clinically indicated, or after 2 years if relapse is suspected.  Plan: -Completed 2 years in 5/24 -CT neck -Stable without new or enlarging lymph nodes  -Pending CT C/A/P result from 5/4/24 -Lab today- pending CMP, LDH -Continue H&P every 6-12 mo until year 3, Imaging only if relapse is suspected  RTO 6 months

## 2024-05-13 LAB
ALBUMIN SERPL ELPH-MCNC: 4.9 G/DL
ALP BLD-CCNC: 65 U/L
ALT SERPL-CCNC: 16 U/L
ANION GAP SERPL CALC-SCNC: 13 MMOL/L
AST SERPL-CCNC: 22 U/L
BILIRUB SERPL-MCNC: 0.7 MG/DL
BUN SERPL-MCNC: 30 MG/DL
CALCIUM SERPL-MCNC: 9.6 MG/DL
CHLORIDE SERPL-SCNC: 103 MMOL/L
CO2 SERPL-SCNC: 22 MMOL/L
CREAT SERPL-MCNC: 0.69 MG/DL
EGFR: 92 ML/MIN/1.73M2
GLUCOSE SERPL-MCNC: 98 MG/DL
LDH SERPL-CCNC: 141 U/L
POTASSIUM SERPL-SCNC: 4.7 MMOL/L
PROT SERPL-MCNC: 7.7 G/DL
SODIUM SERPL-SCNC: 138 MMOL/L

## 2024-05-24 ENCOUNTER — APPOINTMENT (OUTPATIENT)
Dept: PHYSICAL MEDICINE AND REHAB | Facility: CLINIC | Age: 74
End: 2024-05-24
Payer: MEDICARE

## 2024-05-24 DIAGNOSIS — M54.16 RADICULOPATHY, LUMBAR REGION: ICD-10-CM

## 2024-05-24 DIAGNOSIS — M79.2 NEURALGIA AND NEURITIS, UNSPECIFIED: ICD-10-CM

## 2024-05-24 DIAGNOSIS — Z74.09 OTHER REDUCED MOBILITY: ICD-10-CM

## 2024-05-24 DIAGNOSIS — R32 UNSPECIFIED URINARY INCONTINENCE: ICD-10-CM

## 2024-05-24 DIAGNOSIS — M54.50 LOW BACK PAIN, UNSPECIFIED: ICD-10-CM

## 2024-05-24 PROCEDURE — 99214 OFFICE O/P EST MOD 30 MIN: CPT

## 2024-05-24 RX ORDER — GABAPENTIN 300 MG/1
300 CAPSULE ORAL
Qty: 90 | Refills: 2 | Status: DISCONTINUED | COMMUNITY
Start: 2020-02-03 | End: 2024-05-24

## 2024-05-24 RX ORDER — DULOXETINE HYDROCHLORIDE 20 MG/1
20 CAPSULE, DELAYED RELEASE PELLETS ORAL
Qty: 30 | Refills: 1 | Status: ACTIVE | COMMUNITY
Start: 2024-05-24 | End: 1900-01-01

## 2024-05-24 NOTE — ASSESSMENT
[FreeTextEntry1] : 73-year-old female presenting for evaluation.  #Neuropathy: -Previously discontinued gabapentin given side effects -Never tried pregabalin out of fear of side effects -Start Duloxetine 20 mg daily-rx sent   #Lumbar radiculopathy: -Reports progressive weakness in RLE with radicular pain -Obtain MRI lumbar spine with/without IV contrast-she reports she can have MRI and contrast  -Referral given for Ortho-spine for evaluation  -Discussed/educated about red flags for which to emergently report to ER   Follow up in 1 month

## 2024-05-24 NOTE — END OF VISIT
[] : Fellow [FreeTextEntry3] : I have personally seen and examined the patient. I fully participated in the care of this patient. I have made amendments to the documentation where necessary, and agree with the history, physical exam, and plan as documented by the Fellow, Dr. Pace.

## 2024-05-24 NOTE — HISTORY OF PRESENT ILLNESS
[FreeTextEntry1] : Ms. Wooten is a 73-year-old female with remote history of R breast cancer in 1998 s/p CMF, and bilateral DCIS s/p b/l mastectomy in 2018, then found to have a Right paravertebral soft tissue lesion at the level of T12/L1 which was discovered on imaging s/p fall in 6/2021. She is s/p core biopsy by IR on 9/14/21 which shows - suspicious for Hodgkin's lymphoma, favoring nodular lymphocyte predominant, however additional tissue needed for a definitive diagnosis.  S/p repeat core biopsy on 10/18/21 --> consistent with classic Hodgkin's lymphoma. Chemotherapy with AVD started on 2/4/22-5/13/22.    She was not able to start PT due to taking care of her . She stopped taking Gabapentin because it was unhelpful and had side effects. She never tried Lyrica.   Her pain is the worst at her feet. She describes it as burning. Her pain radiates from her feet and up to her knees. She also has low back pain with radiation to her right posterior thigh to the back of her knee.   She has prior history of urinary incontinence she sees a Urology-Gynecologist. She gets frequent UTI. Denies any stool incontinence. Denies any saddle anesthesia.   Endorses fatigue and poor sleep. She has RLE weakness which she states has been worsening but unsure when it started to worsen.

## 2024-05-24 NOTE — PHYSICAL EXAM
[FreeTextEntry1] : Gen: Patient is A&O x 3, NAD HEENT: EOMI, hearing grossly normal Resp: regular, non - labored CV: pulses regular Skin: no rashes, erythema Lymph: no clubbing, cyanosis, edema, or palpable lymphadenopathy Inspection: no instability or misalignment ROM: full throughout, no pain with hip ROM, no pain with lumbar spine flexion/extension  Palpation: No TTP  Sensation: decreased to LT in b/l Feet, hypersensitivity to LT in RLE  Reflexes: 1+ and symmetric throughout Strength: 5/5 throughout, except 3-4/5 in R hip flexion Special tests: +seated leg test on right. -seated leg test on left. -Lozano. No ankle clonus. -Hip impingement maneuvers. Gait: Difficulty standing tandem

## 2024-05-24 NOTE — DATA REVIEWED
[FreeTextEntry1] : May 2024 CT A/P: No evidence of suspicious mass or bulky adenopathy in the chest, abdomen, or pelvis. Normal size spleen.  May 2024 CT Chest: No evidence of suspicious mass or bulky adenopathy in the chest, abdomen, or pelvis. Normal size spleen.  May 2024 CT neck: Compared to 11/7/2023, stable exam without new or enlarging lymph nodes in the neck. No lesions or abnormal enhancement in aerodigestive mucosa. Mild mucosal thickening at the base of the right maxillary sinus can be odontogenic. General dental follow-up is recommended.  May 2024 labs: 	AST (SGOT)	22 U/L	 	  	ALT(SGPT)	16 U/L	 	  	eGFR	92 mL/min/1.73m2

## 2024-06-19 ENCOUNTER — APPOINTMENT (OUTPATIENT)
Dept: ORTHOPEDIC SURGERY | Facility: CLINIC | Age: 74
End: 2024-06-19

## 2024-06-20 ENCOUNTER — APPOINTMENT (OUTPATIENT)
Dept: UROGYNECOLOGY | Facility: CLINIC | Age: 74
End: 2024-06-20
Payer: MEDICARE

## 2024-06-20 DIAGNOSIS — N32.81 OVERACTIVE BLADDER: ICD-10-CM

## 2024-06-20 DIAGNOSIS — R35.1 NOCTURIA: ICD-10-CM

## 2024-06-20 DIAGNOSIS — N39.0 URINARY TRACT INFECTION, SITE NOT SPECIFIED: ICD-10-CM

## 2024-06-20 DIAGNOSIS — R35.0 FREQUENCY OF MICTURITION: ICD-10-CM

## 2024-06-20 LAB
BILIRUB UR QL STRIP: NEGATIVE
CLARITY UR: CLEAR
COLLECTION METHOD: NORMAL
GLUCOSE UR-MCNC: NEGATIVE
HCG UR QL: 0.2 EU/DL
HGB UR QL STRIP.AUTO: NORMAL
KETONES UR-MCNC: NEGATIVE
LEUKOCYTE ESTERASE UR QL STRIP: NEGATIVE
NITRITE UR QL STRIP: NEGATIVE
PH UR STRIP: 5.5
PROT UR STRIP-MCNC: NEGATIVE
SP GR UR STRIP: 1.02

## 2024-06-20 PROCEDURE — 99214 OFFICE O/P EST MOD 30 MIN: CPT | Mod: 25

## 2024-06-20 PROCEDURE — 81003 URINALYSIS AUTO W/O SCOPE: CPT | Mod: QW

## 2024-06-20 PROCEDURE — 51701 INSERT BLADDER CATHETER: CPT | Mod: 59

## 2024-06-20 RX ORDER — SULFAMETHOXAZOLE AND TRIMETHOPRIM 800; 160 MG/1; MG/1
800-160 TABLET ORAL TWICE DAILY
Qty: 10 | Refills: 0 | Status: ACTIVE | COMMUNITY
Start: 2024-04-12 | End: 1900-01-01

## 2024-06-20 RX ORDER — METHENAMINE HIPPURATE 1 G/1
1 TABLET ORAL TWICE DAILY
Qty: 180 | Refills: 0 | Status: ACTIVE | COMMUNITY
Start: 2020-02-03 | End: 1900-01-01

## 2024-06-20 NOTE — PHYSICAL EXAM
[No Acute Distress] : in no acute distress [Well developed] : well developed [Well Nourished] : ~L well nourished [Good Hygeine] : demonstrates good hygeine [Oriented x3] : oriented to person, place, and time [Normal Memory] : ~T memory was ~L unimpaired [Normal Mood/Affect] : mood and affect are normal [Warm and Dry] : was warm and dry to touch [Normal Gait] : gait was normal [Vulvar Atrophy] : vulvar atrophy [Labia Majora] : were normal [Labia Minora] : were normal [Normal] : was normal [Cough] : no cough

## 2024-06-20 NOTE — PROCEDURE
[Straight Catheterization] : insertion of a straight catheter [Urinary Tract Infection] : a urinary tract infection [Patient] : the patient [___ Fr Straight Tip] : a [unfilled] in [place] straight tip catheter [None] : there were no complications with the catheter insertion [Clear] : clear [Culture] : culture [Urinalysis] : urinalysis [Residual Volume ___ cc] : residual volume [unfilled] cc [No Complications] : no complications [Tolerated Well] : the patient tolerated the procedure well [Post procedure instructions and information given] : Post procedure instructions and information were given and reviewed with patient. [0] : 0

## 2024-06-20 NOTE — HISTORY OF PRESENT ILLNESS
[FreeTextEntry1] : Pt presents to the office today c/o increased urinary frequency, primarily at night, and at home urine test came back positive for UTI. Pt denies any fever, chills, dysuria, hematuria or flank pain. Denies sensation of incomplete bladder emptying. Pt with h/o recurrent UTI on Methenamine 1 tab at bedtime and vaginal estrogen but not consistently. Pt with h/o breast cancer and Hodgkins's lymphoma and states is hesitant to continue with vaginal estrogen, with personal history of cancer, although cleared to do so by oncologist. Pt last UTI was in April 2024: 50,000 - 99,000 CFU/mL Proteus mirabilis treated with Bactrim and prior to that August 2023: >100,000 CFU/ml Proteus mirabilis treated with Bactrim. CT A/P from 5/4/24 showed Non obstructing 2 mm right upper pole stone in kidneys. No hydronephrosis. Subcentimeter left renal lesion too small to characterize, likely a cyst. Bladder WNL. No evidence of suspicious mass or bulky adenopathy in the chest, abdomen, or pelvis.  Pt with h/o OAB has tried OAB medications in the past and at last visit on 4/12/24 was to start Myrbetriq 25mg. Pt states has not started Myrbetriq yet and would like to discuss OAB management options today.  Pt with h/o POP and has RS#3 in place. Reports good support. Denies any pelvic pain, pressure or vaginal bleeding. She reports has been performing self-care without difficulty. Last pessary maintenance was 4/12/24 in this office. Pt has h/o IBS and complains of rectal bleeding "at times". Pt states is due to have colonoscopy and will call to schedule.

## 2024-06-20 NOTE — DISCUSSION/SUMMARY
[FreeTextEntry1] : Discussed urine seen in office and UA dipstick read small blood which can indicate a UTI with her symptoms. Will be sending urine for cath UA and urine culture for further evaluation; will follow up with results and change medication if resistant to Bactrim. Discussed empiric treatment vs waiting for results and pt wants to be treated empirically. She denies any known drug allergies to Bactrim and states "that antibiotic works well for me".  Reviewed behavioral modifications for recurrent UTI including increased oral intake, cranberry tablets, wiping front to back, and d mannose. We discussed management of recurrent UTIs with vaginal estrogen and antibiotic prophylaxis. We discussed if symptomatic we prefer catheterized specimen however if she cannot come to the office, discussed u/a and u/c prior to antibiotic treatment. Pt verbalized understanding. Pt states would not like to continue with vaginal estrogen at this time. Discussed non-hormonal moisturizers such as Replens and Luvena and pt states would like to try that. Discussed increasing Methenamine to BID dosing. Pt agreeable. Methenamine BID eRx sent to pt's preferred pharmacy. Medication instructions given. Pt verbalized understanding.   Reviewed management options for overactive bladder including observation, behavioral modifications and bladder training, physical therapy and medications including anticholinergics and beta 3 agonists. We discussed additional treatment options including sacral neuromodulation, PTNS and intra detrusor Botox. Pt states would like to try OAB medications again. Pt currently has Myrbetriq at home and samples of Gemtesa. Pt states will call insurance company to see which of these medications are covered. Pt will call our office to let us know which medication is covered and F/u with our office 4-6 weeks from when she starts OAB medication for PVR check and to check medication effectiveness. Pt will schedule pessary maintenance appointment for October 2024. All questions answered. Instructed to call with any questions or concerns.

## 2024-06-25 LAB
APPEARANCE: CLEAR
BACTERIA: NEGATIVE /HPF
BILIRUBIN URINE: NEGATIVE
BLOOD URINE: NEGATIVE
CAST: NORMAL /LPF
COLOR: YELLOW
EPITHELIAL CELLS: 4 /HPF
GLUCOSE QUALITATIVE U: NEGATIVE MG/DL
KETONES URINE: NEGATIVE MG/DL
LEUKOCYTE ESTERASE URINE: ABNORMAL
MICROSCOPIC-UA: NORMAL
NITRITE URINE: NEGATIVE
PH URINE: 5.5
PROTEIN URINE: NEGATIVE MG/DL
RED BLOOD CELLS URINE: 4 /HPF
REVIEW: NORMAL
SPECIFIC GRAVITY URINE: 1.02
UROBILINOGEN URINE: 1 MG/DL
WHITE BLOOD CELLS URINE: 1 /HPF

## 2024-06-26 ENCOUNTER — APPOINTMENT (OUTPATIENT)
Dept: MRI IMAGING | Facility: CLINIC | Age: 74
End: 2024-06-26

## 2024-08-13 ENCOUNTER — APPOINTMENT (OUTPATIENT)
Dept: MRI IMAGING | Facility: CLINIC | Age: 74
End: 2024-08-13

## 2024-08-20 ENCOUNTER — APPOINTMENT (OUTPATIENT)
Dept: ORTHOPEDIC SURGERY | Facility: CLINIC | Age: 74
End: 2024-08-20

## 2024-09-11 ENCOUNTER — APPOINTMENT (OUTPATIENT)
Dept: MRI IMAGING | Facility: CLINIC | Age: 74
End: 2024-09-11
Payer: MEDICARE

## 2024-09-11 ENCOUNTER — OUTPATIENT (OUTPATIENT)
Dept: OUTPATIENT SERVICES | Facility: HOSPITAL | Age: 74
LOS: 1 days | End: 2024-09-11

## 2024-09-11 DIAGNOSIS — Z96.7 PRESENCE OF OTHER BONE AND TENDON IMPLANTS: Chronic | ICD-10-CM

## 2024-09-11 DIAGNOSIS — M54.16 RADICULOPATHY, LUMBAR REGION: ICD-10-CM

## 2024-09-11 DIAGNOSIS — Z98.890 OTHER SPECIFIED POSTPROCEDURAL STATES: Chronic | ICD-10-CM

## 2024-09-11 DIAGNOSIS — Z90.13 ACQUIRED ABSENCE OF BILATERAL BREASTS AND NIPPLES: Chronic | ICD-10-CM

## 2024-09-11 DIAGNOSIS — Z90.89 ACQUIRED ABSENCE OF OTHER ORGANS: Chronic | ICD-10-CM

## 2024-09-11 PROCEDURE — 72158 MRI LUMBAR SPINE W/O & W/DYE: CPT | Mod: 26

## 2024-09-17 ENCOUNTER — APPOINTMENT (OUTPATIENT)
Dept: ORTHOPEDIC SURGERY | Facility: CLINIC | Age: 74
End: 2024-09-17
Payer: MEDICARE

## 2024-09-17 ENCOUNTER — NON-APPOINTMENT (OUTPATIENT)
Age: 74
End: 2024-09-17

## 2024-09-17 VITALS
HEIGHT: 64 IN | HEART RATE: 76 BPM | BODY MASS INDEX: 21.34 KG/M2 | DIASTOLIC BLOOD PRESSURE: 88 MMHG | WEIGHT: 125 LBS | SYSTOLIC BLOOD PRESSURE: 160 MMHG

## 2024-09-17 DIAGNOSIS — M47.12 OTHER SPONDYLOSIS WITH MYELOPATHY, CERVICAL REGION: ICD-10-CM

## 2024-09-17 DIAGNOSIS — M47.816 SPONDYLOSIS W/OUT MYELOPATHY OR RADICULOPATHY, LUMBAR REGION: ICD-10-CM

## 2024-09-17 PROBLEM — M16.11 PRIMARY OSTEOARTHRITIS OF RIGHT HIP: Status: ACTIVE | Noted: 2024-09-17

## 2024-09-17 PROCEDURE — 99204 OFFICE O/P NEW MOD 45 MIN: CPT

## 2024-09-17 RX ORDER — MELOXICAM 15 MG/1
15 TABLET ORAL DAILY
Qty: 30 | Refills: 1 | Status: ACTIVE | COMMUNITY
Start: 2024-09-17 | End: 1900-01-01

## 2024-09-17 RX ORDER — TIZANIDINE HYDROCHLORIDE 4 MG/1
4 CAPSULE ORAL AT BEDTIME
Qty: 30 | Refills: 1 | Status: ACTIVE | COMMUNITY
Start: 2024-09-17 | End: 1900-01-01

## 2024-09-18 NOTE — HISTORY OF PRESENT ILLNESS
[de-identified] : - Chief Complaint (CC) : Persistent back pain and leg weakness, worsening over time. - History of Present Illness : Patient Julissa, a 74-year-old female, has reported increasing pain in the back and weakening of the legs over an unspecified duration. The discomfort originates in her lower back, radiating down her right leg causing her feet to go numb. Her feet have been numb for years now status post chemoradiation therapy. Patient has also diagnosed with neuropathy which is getting worse affecting her muscle strength and severely limiting her mobility. Also mentioning chemotherapy as a possible aggravator of her symptoms. She states that her discomfort is mainly in the right side of the low back right gluteal region right groin and right thigh.  She states that is severe she has difficulty going up and down stairs getting up from a seated position or lateral movement pain in her right leg.  She states sometimes will radiate down to her feet however that is rare it is mainly located to the upper leg.She is also complaining of difficulty with balance difficulty with her gait and some numbness and tingling in her hands.  She states that normally she is very active however over the past year she has noticed difficulty with ambulating she is now forced to use a cane to feel comfortable.  She denies any difficulty with fine motor tasks.

## 2024-09-18 NOTE — HISTORY OF PRESENT ILLNESS
[de-identified] : - Chief Complaint (CC) : Persistent back pain and leg weakness, worsening over time. - History of Present Illness : Patient Julissa, a 74-year-old female, has reported increasing pain in the back and weakening of the legs over an unspecified duration. The discomfort originates in her lower back, radiating down her right leg causing her feet to go numb. Her feet have been numb for years now status post chemoradiation therapy. Patient has also diagnosed with neuropathy which is getting worse affecting her muscle strength and severely limiting her mobility. Also mentioning chemotherapy as a possible aggravator of her symptoms. She states that her discomfort is mainly in the right side of the low back right gluteal region right groin and right thigh.  She states that is severe she has difficulty going up and down stairs getting up from a seated position or lateral movement pain in her right leg.  She states sometimes will radiate down to her feet however that is rare it is mainly located to the upper leg.She is also complaining of difficulty with balance difficulty with her gait and some numbness and tingling in her hands.  She states that normally she is very active however over the past year she has noticed difficulty with ambulating she is now forced to use a cane to feel comfortable.  She denies any difficulty with fine motor tasks.

## 2024-09-18 NOTE — ASSESSMENT
[FreeTextEntry1] : 74-year-old female with lumbar spondylosis, severe right hip osteoarthritis and possible cervical myelopathy  The lower back and associated leg pain could likely be due to the progressing hip arthritis, which we must discuss more thoroughly to consider a potential hip replacement operation. An MRI of the neck is also recommended to rule out cord compression.  - Order MRI of the neck to rule out cord compression  - Schedule an appointment with  to discuss hip replacement  - Upon obtaining results of the MRI and 's opinion, start physical therapy as required   -I will see her back after the MRI to go over the results and discuss further options at that time

## 2024-09-18 NOTE — PHYSICAL EXAM
[de-identified] : CONSTITUTIONAL: Patient is a very pleasant individual who is well-nourished and appears stated age. PSYCHIATRIC: Alert and oriented times three and in no apparent distress, and participates with orthopedic evaluation well. HEAD: Atraumatic and nonsyndromic in appearance. EENT: No thyromegaly, EOMI. RESPIRATORY: Respiratory rate is regular, not dyspneic on examination. LYMPHATICS: There is no cervical or axillary lymphadenopathy. INTEGUMENTARY: Skin is clean, dry, and intact to bilateral lower extremities. VASCULAR: There is brisk capillary refill about the bilateral Lower Extremities with 2+ DP Pulse  Palpation: Mild tenderness right side PSIS lower lumbar spine There is severe pain with internal and external range of motion of her right hip Muscle Strength Testing: Hip Flexion: 5/5 LLE, 3/5 RLE Knee Extension: 5/5 B/L Knee Flexion: 5/5 B/L Dorsiflexion: 5/5 B/L EHL: 5/5 B/L Plantarflexion: 5/5 B/L  Sensation: SILT L2-S1 B/L except: Altered sensation dorsum and plantar surface bilateral feet  Reflexes: 2+ Quadriceps/Achilles  Gait: Unable to perform tandem gait  Special Testing:  Negative Hoffmans bilateral Negative SLR BLLE Negative clonus BLLE  [de-identified] : MRI lumbar spine performed in [facility] PACS on 9/11/2024 axial sagittal T1-T2 and STIR weighted images demonstrates broad-based disc bulging and ligamentum flavum hypertrophy facet arthropathy at L3-4 no significant central or foraminal stenosis L4-5 there is broad-based disc bulging moderate disc height loss type II Modic endplate changes worse on the right and mild central canal stenosis L5-S1 maintained disc height mild facet arthropathy  CT [facility] demonstrates lumbar spondylosis with severe bilateral hip osteoarthritis right greater than left  CT scan cervical spine demonstrates multilevel cervical spondylosis with anterolisthesis of C3 on C4 unable to appreciate canal narrowing based on CT cuts

## 2024-09-18 NOTE — PHYSICAL EXAM
[de-identified] : CONSTITUTIONAL: Patient is a very pleasant individual who is well-nourished and appears stated age. PSYCHIATRIC: Alert and oriented times three and in no apparent distress, and participates with orthopedic evaluation well. HEAD: Atraumatic and nonsyndromic in appearance. EENT: No thyromegaly, EOMI. RESPIRATORY: Respiratory rate is regular, not dyspneic on examination. LYMPHATICS: There is no cervical or axillary lymphadenopathy. INTEGUMENTARY: Skin is clean, dry, and intact to bilateral lower extremities. VASCULAR: There is brisk capillary refill about the bilateral Lower Extremities with 2+ DP Pulse  Palpation: Mild tenderness right side PSIS lower lumbar spine There is severe pain with internal and external range of motion of her right hip Muscle Strength Testing: Hip Flexion: 5/5 LLE, 3/5 RLE Knee Extension: 5/5 B/L Knee Flexion: 5/5 B/L Dorsiflexion: 5/5 B/L EHL: 5/5 B/L Plantarflexion: 5/5 B/L  Sensation: SILT L2-S1 B/L except: Altered sensation dorsum and plantar surface bilateral feet  Reflexes: 2+ Quadriceps/Achilles  Gait: Unable to perform tandem gait  Special Testing:  Negative Hoffmans bilateral Negative SLR BLLE Negative clonus BLLE  [de-identified] : MRI lumbar spine performed in [facility] PACS on 9/11/2024 axial sagittal T1-T2 and STIR weighted images demonstrates broad-based disc bulging and ligamentum flavum hypertrophy facet arthropathy at L3-4 no significant central or foraminal stenosis L4-5 there is broad-based disc bulging moderate disc height loss type II Modic endplate changes worse on the right and mild central canal stenosis L5-S1 maintained disc height mild facet arthropathy  CT [facility] abdomen [facility] demonstrates lumbar spondylosis with severe bilateral hip osteoarthritis right greater than left  CT scan cervical spine demonstrates multilevel cervical spondylosis with anterolisthesis of C3 on C4 unable to appreciate canal narrowing based on CT cuts

## 2024-09-24 ENCOUNTER — NON-APPOINTMENT (OUTPATIENT)
Age: 74
End: 2024-09-24

## 2024-09-26 ENCOUNTER — APPOINTMENT (OUTPATIENT)
Dept: ORTHOPEDIC SURGERY | Facility: CLINIC | Age: 74
End: 2024-09-26
Payer: MEDICARE

## 2024-09-26 VITALS
SYSTOLIC BLOOD PRESSURE: 135 MMHG | WEIGHT: 125 LBS | HEART RATE: 69 BPM | HEIGHT: 64 IN | BODY MASS INDEX: 21.34 KG/M2 | DIASTOLIC BLOOD PRESSURE: 85 MMHG

## 2024-09-26 DIAGNOSIS — M16.11 UNILATERAL PRIMARY OSTEOARTHRITIS, RIGHT HIP: ICD-10-CM

## 2024-09-26 PROCEDURE — 73502 X-RAY EXAM HIP UNI 2-3 VIEWS: CPT

## 2024-09-26 PROCEDURE — G2211 COMPLEX E/M VISIT ADD ON: CPT

## 2024-09-26 PROCEDURE — 99215 OFFICE O/P EST HI 40 MIN: CPT

## 2024-09-26 NOTE — PHYSICAL EXAM
[de-identified] :  The patient appears well nourished and in no apparent distress. The patient is alert and oriented to person, place, and time. Affect and mood appear normal. The head is normocephalic and atraumatic. The eyes reveal normal sclera and extra ocular muscles are intact. The tongue is midline with no apparent lesions. Skin shows normal turgor with no evidence of eczema or psoriasis. No respiratory distress noted. Sensation grossly intact. [de-identified] :  Exam of the right hip shows hip flexion of 45 degrees, hip external rotation of 15 degrees, hip internal rotation of 0 degrees.  Leg lengths are overall equal  Exam of the left hip shows hip flexion of 50 degrees, hip external rotation of 25 degrees, hip obligatory external rotation of 10 degrees.  5/5 motor strength bilaterally distally. Sensation intact distally [de-identified] : X-ray: AP of the pelvis and 2 views of the right hip demonstrate bone on bone arthritis and left hip bone on bone arthritis (as seen on AP pelvis)

## 2024-09-26 NOTE — HISTORY OF PRESENT ILLNESS
[de-identified] : Patient is 74-year-old female presenting for evaluation of right hip pain and stiffness.  Her symptoms have been worsening over the past few years.  She builds the pain localized to the groin and buttock.  Her stiffness has been inhibiting her ability put socks and shoes on.  Patient has not had intra-articular injections thus far.  She has tried PT and anti-inflammatories not relief.  She is hopeful to discuss right total hip replacement. Past medical history includes Hodgkin's lymphoma currently in remission and no longer on chemotherapy.

## 2024-09-26 NOTE — DISCUSSION/SUMMARY
[de-identified] :  ALTAGRACIA SHERIFF is a 74 year old female who presents with bilateral hip bone on bone arthritis and severely limited hip ROM bilaterally. The patient has exhausted a minimum of 3 months conservative treatment including prior injections, physical therapy, over the counter NSAIDS and pain medication where indicated. In addition, the patient's quality of life is diminished due to significant chronic pain. The patient is having difficulty ambulating, descending stairs, and rising from a seated position.   Based upon the patient's continued symptoms and failure to respond to conservative treatment, I have recommended a right total hip replacement for this patient. A long discussion took place with the patient describing what a total joint replacement is and what the procedure would entail. A hip model, similar to the implants that will be used during the operation, was utilized to demonstrate the implants. Choices of implant manufactures were discussed and reviewed. The ability to secure the implant utilizing cement or cementless (press fit) fixation was discussed. Anterior and posterior exposures were discussed. For this patient an anterior approach is appropriate. The patient agrees with the plan of care as well as the use of implants.   The hospitalization and post-operative care and rehabilitation were also discussed. The use of perioperative antibiotics and DVT prophylaxis were discussed. The risk, benefits and alternatives to a surgical intervention were discussed at length with the patient. The patient was also advised of risks related to the medical comorbidities and elevated body mass index (BMI). A lengthy discussion took place to review the most common complications including but not limited to: deep vein thrombosis, pulmonary embolus, heart attack, stroke, infection, wound breakdown, numbness, damage to nerves, tendon, muscles, arteries or other blood vessels, death and other possible complications from anesthesia. The patient was told that we will take steps to minimize these risks by using sterile technique, antibiotics and DVT prophylaxis when appropriate and follow the patient postoperatively in the office setting to monitor progress. The possibility of recurrent pain, no improvement in pain and actual worsening of pain were also discussed with the patient.   The discharge plan of care focused on the patient going home following surgery. The patient was encouraged to make the necessary arrangements to have someone stay with them when they are discharged home. Following discharge, a home care nurse will visit the patient. The home care nurse will open your home care case and request home physical therapy services. Home physical therapy will commence following discharge provided it is appropriate and covered by the health insurance benefit plan.   The benefits of surgery were discussed with the patient including the potential for improving the patient's current clinical condition through operative intervention. Alternatives to surgical intervention including continued conservative management were also discussed in detail. All questions were answered to the satisfaction of the patient. The treatment plan of care, as well as a model of a total hip equivalent to the one that will be used for their total joint replacement, was shared with the patient. The patient participated and agreed to the plan of care as well as the use of the recommended implants for their total hip replacement surgery.   We are planning for robotic assistance for the total hip replacement. We discussed that this will require placement of iliac crest pins in the contralateral iliac crest for pelvic bone registration to allow for robotic guidance for the surgery. We will utilize robotic assistance to improve accuracy of the implants, and accurate restoration of both leg lengths and femoral offset.  We will plan her left total hip replacement 3 months after her right total hip replacement.

## 2024-09-26 NOTE — CONSULT LETTER
[Dear  ___] : Dear  [unfilled], [Consult Letter:] : I had the pleasure of evaluating your patient, [unfilled]. [Please see my note below.] : Please see my note below. [Consult Closing:] : Thank you very much for allowing me to participate in the care of this patient.  If you have any questions, please do not hesitate to contact me. [Sincerely,] : Sincerely, [[email or web address]] : Dave Hernandez MD Chief of Joint Replacement Primary & Revision Hip and Knee Replacement  [facility] [place]

## 2024-10-02 ENCOUNTER — APPOINTMENT (OUTPATIENT)
Dept: MRI IMAGING | Facility: CLINIC | Age: 74
End: 2024-10-02
Payer: MEDICARE

## 2024-10-02 PROCEDURE — 72141 MRI NECK SPINE W/O DYE: CPT | Mod: 26

## 2024-10-10 ENCOUNTER — APPOINTMENT (OUTPATIENT)
Dept: ORTHOPEDIC SURGERY | Facility: CLINIC | Age: 74
End: 2024-10-10
Payer: MEDICARE

## 2024-10-10 VITALS — BODY MASS INDEX: 21.34 KG/M2 | HEIGHT: 64 IN | WEIGHT: 125 LBS

## 2024-10-10 DIAGNOSIS — M47.12 OTHER SPONDYLOSIS WITH MYELOPATHY, CERVICAL REGION: ICD-10-CM

## 2024-10-10 PROCEDURE — 99215 OFFICE O/P EST HI 40 MIN: CPT | Mod: 57

## 2024-10-29 ENCOUNTER — APPOINTMENT (OUTPATIENT)
Dept: CT IMAGING | Facility: CLINIC | Age: 74
End: 2024-10-29

## 2024-11-05 ENCOUNTER — OUTPATIENT (OUTPATIENT)
Dept: OUTPATIENT SERVICES | Facility: HOSPITAL | Age: 74
LOS: 1 days | Discharge: HOME | End: 2024-11-05

## 2024-11-05 DIAGNOSIS — C81.90 HODGKIN LYMPHOMA, UNSPECIFIED, UNSPECIFIED SITE: ICD-10-CM

## 2024-11-05 DIAGNOSIS — Z98.890 OTHER SPECIFIED POSTPROCEDURAL STATES: Chronic | ICD-10-CM

## 2024-11-05 DIAGNOSIS — Z96.7 PRESENCE OF OTHER BONE AND TENDON IMPLANTS: Chronic | ICD-10-CM

## 2024-11-05 DIAGNOSIS — Z90.89 ACQUIRED ABSENCE OF OTHER ORGANS: Chronic | ICD-10-CM

## 2024-11-05 DIAGNOSIS — Z90.13 ACQUIRED ABSENCE OF BILATERAL BREASTS AND NIPPLES: Chronic | ICD-10-CM

## 2024-11-11 ENCOUNTER — APPOINTMENT (OUTPATIENT)
Dept: ENDOCRINOLOGY | Facility: CLINIC | Age: 74
End: 2024-11-11

## 2024-11-13 ENCOUNTER — APPOINTMENT (OUTPATIENT)
Dept: HEMATOLOGY ONCOLOGY | Facility: CLINIC | Age: 74
End: 2024-11-13
Payer: MEDICARE

## 2024-11-13 ENCOUNTER — RESULT REVIEW (OUTPATIENT)
Age: 74
End: 2024-11-13

## 2024-11-13 VITALS
HEART RATE: 71 BPM | WEIGHT: 128.75 LBS | OXYGEN SATURATION: 100 % | BODY MASS INDEX: 21.98 KG/M2 | HEIGHT: 64 IN | SYSTOLIC BLOOD PRESSURE: 150 MMHG | DIASTOLIC BLOOD PRESSURE: 79 MMHG

## 2024-11-13 DIAGNOSIS — C81.90 HODGKIN LYMPHOMA, UNSPECIFIED, UNSPECIFIED SITE: ICD-10-CM

## 2024-11-13 LAB
BASOPHILS # BLD AUTO: 0.1 K/UL — SIGNIFICANT CHANGE UP (ref 0–0.2)
BASOPHILS NFR BLD AUTO: 0.9 % — SIGNIFICANT CHANGE UP (ref 0–2)
EOSINOPHIL # BLD AUTO: 0.1 K/UL — SIGNIFICANT CHANGE UP (ref 0–0.5)
EOSINOPHIL NFR BLD AUTO: 1.5 % — SIGNIFICANT CHANGE UP (ref 0–6)
HCT VFR BLD CALC: 43.2 % — SIGNIFICANT CHANGE UP (ref 34.5–45)
HGB BLD-MCNC: 14 G/DL — SIGNIFICANT CHANGE UP (ref 11.5–15.5)
LYMPHOCYTES # BLD AUTO: 2.2 K/UL — SIGNIFICANT CHANGE UP (ref 1–3.3)
LYMPHOCYTES # BLD AUTO: 24.9 % — SIGNIFICANT CHANGE UP (ref 13–44)
MCHC RBC-ENTMCNC: 30 PG — SIGNIFICANT CHANGE UP (ref 27–34)
MCHC RBC-ENTMCNC: 32.3 G/DL — SIGNIFICANT CHANGE UP (ref 32–36)
MCV RBC AUTO: 92.8 FL — SIGNIFICANT CHANGE UP (ref 80–100)
MONOCYTES # BLD AUTO: 0.8 K/UL — SIGNIFICANT CHANGE UP (ref 0–0.9)
MONOCYTES NFR BLD AUTO: 8.9 % — SIGNIFICANT CHANGE UP (ref 2–14)
NEUTROPHILS # BLD AUTO: 5.6 K/UL — SIGNIFICANT CHANGE UP (ref 1.8–7.4)
NEUTROPHILS NFR BLD AUTO: 63.8 % — SIGNIFICANT CHANGE UP (ref 43–77)
PLATELET # BLD AUTO: 198 K/UL — SIGNIFICANT CHANGE UP (ref 150–400)
RBC # BLD: 4.66 M/UL — SIGNIFICANT CHANGE UP (ref 3.8–5.2)
RBC # FLD: 14.3 % — SIGNIFICANT CHANGE UP (ref 10.3–14.5)
WBC # BLD: 8.8 K/UL — SIGNIFICANT CHANGE UP (ref 3.8–10.5)
WBC # FLD AUTO: 8.8 K/UL — SIGNIFICANT CHANGE UP (ref 3.8–10.5)

## 2024-11-13 PROCEDURE — 99214 OFFICE O/P EST MOD 30 MIN: CPT

## 2024-11-13 PROCEDURE — G2211 COMPLEX E/M VISIT ADD ON: CPT

## 2024-11-13 RX ORDER — AMOXICILLIN AND CLAVULANATE POTASSIUM 500; 125 MG/1; MG/1
500-125 TABLET, FILM COATED ORAL
Qty: 20 | Refills: 0 | Status: DISCONTINUED | COMMUNITY
Start: 2024-09-09

## 2024-11-14 LAB
ALBUMIN SERPL ELPH-MCNC: 4.6 G/DL
ALP BLD-CCNC: 64 U/L
ALT SERPL-CCNC: 13 U/L
ANION GAP SERPL CALC-SCNC: 11 MMOL/L
AST SERPL-CCNC: 19 U/L
BILIRUB SERPL-MCNC: 0.4 MG/DL
BUN SERPL-MCNC: 26 MG/DL
CALCIUM SERPL-MCNC: 9.5 MG/DL
CHLORIDE SERPL-SCNC: 104 MMOL/L
CO2 SERPL-SCNC: 26 MMOL/L
CREAT SERPL-MCNC: 0.62 MG/DL
EGFR: 94 ML/MIN/1.73M2
GLUCOSE SERPL-MCNC: 88 MG/DL
LDH SERPL-CCNC: 147 U/L
POTASSIUM SERPL-SCNC: 4.6 MMOL/L
PROT SERPL-MCNC: 7.5 G/DL
SODIUM SERPL-SCNC: 141 MMOL/L

## 2024-12-11 ENCOUNTER — APPOINTMENT (OUTPATIENT)
Dept: ORTHOPEDIC SURGERY | Facility: HOSPITAL | Age: 74
End: 2024-12-11

## 2025-01-03 ENCOUNTER — APPOINTMENT (OUTPATIENT)
Dept: ORTHOPEDIC SURGERY | Facility: CLINIC | Age: 75
End: 2025-01-03

## 2025-01-14 ENCOUNTER — APPOINTMENT (OUTPATIENT)
Dept: ORTHOPEDIC SURGERY | Facility: CLINIC | Age: 75
End: 2025-01-14

## 2025-01-22 ENCOUNTER — NON-APPOINTMENT (OUTPATIENT)
Age: 75
End: 2025-01-22

## 2025-01-22 ENCOUNTER — APPOINTMENT (OUTPATIENT)
Dept: ORTHOPEDIC SURGERY | Facility: CLINIC | Age: 75
End: 2025-01-22

## 2025-01-29 ENCOUNTER — APPOINTMENT (OUTPATIENT)
Dept: ORTHOPEDIC SURGERY | Facility: CLINIC | Age: 75
End: 2025-01-29

## 2025-03-17 ENCOUNTER — APPOINTMENT (OUTPATIENT)
Dept: ENDOCRINOLOGY | Facility: CLINIC | Age: 75
End: 2025-03-17
Payer: MEDICARE

## 2025-03-17 VITALS
HEIGHT: 64 IN | HEART RATE: 73 BPM | SYSTOLIC BLOOD PRESSURE: 130 MMHG | DIASTOLIC BLOOD PRESSURE: 80 MMHG | BODY MASS INDEX: 22.2 KG/M2 | OXYGEN SATURATION: 99 % | WEIGHT: 130 LBS

## 2025-03-17 DIAGNOSIS — E04.2 NONTOXIC MULTINODULAR GOITER: ICD-10-CM

## 2025-03-17 DIAGNOSIS — M81.0 AGE-RELATED OSTEOPOROSIS W/OUT CURRENT PATHOLOGICAL FRACTURE: ICD-10-CM

## 2025-03-17 LAB — TSH SERPL-ACNC: 1.54

## 2025-03-17 PROCEDURE — 99214 OFFICE O/P EST MOD 30 MIN: CPT

## 2025-03-17 PROCEDURE — G2211 COMPLEX E/M VISIT ADD ON: CPT

## 2025-03-17 RX ORDER — GABAPENTIN 100 MG/1
100 CAPSULE ORAL
Refills: 0 | Status: ACTIVE | COMMUNITY